# Patient Record
Sex: MALE | Race: WHITE | Employment: OTHER | URBAN - NONMETROPOLITAN AREA
[De-identification: names, ages, dates, MRNs, and addresses within clinical notes are randomized per-mention and may not be internally consistent; named-entity substitution may affect disease eponyms.]

---

## 2017-01-04 ENCOUNTER — PROCEDURE VISIT (OUTPATIENT)
Dept: CARDIOLOGY | Age: 67
End: 2017-01-04

## 2017-01-04 DIAGNOSIS — Z95.810 BIVENTRICULAR IMPLANTABLE CARDIOVERTER-DEFIBRILLATOR IN SITU: Primary | ICD-10-CM

## 2017-01-04 PROCEDURE — 93295 DEV INTERROG REMOTE 1/2/MLT: CPT | Performed by: INTERNAL MEDICINE

## 2017-01-04 PROCEDURE — 93296 REM INTERROG EVL PM/IDS: CPT | Performed by: INTERNAL MEDICINE

## 2017-01-17 ENCOUNTER — OFFICE VISIT (OUTPATIENT)
Dept: CARDIOLOGY | Age: 67
End: 2017-01-17

## 2017-01-17 VITALS
HEART RATE: 88 BPM | WEIGHT: 157.6 LBS | SYSTOLIC BLOOD PRESSURE: 160 MMHG | BODY MASS INDEX: 23.34 KG/M2 | DIASTOLIC BLOOD PRESSURE: 88 MMHG | HEIGHT: 69 IN

## 2017-01-17 DIAGNOSIS — R05.9 COUGH: ICD-10-CM

## 2017-01-17 DIAGNOSIS — I42.9 CARDIOMYOPATHY (HCC): Primary | ICD-10-CM

## 2017-01-17 DIAGNOSIS — I48.20 CHRONIC ATRIAL FIBRILLATION (HCC): ICD-10-CM

## 2017-01-17 DIAGNOSIS — Z95.810 ICD (IMPLANTABLE CARDIOVERTER-DEFIBRILLATOR) IN PLACE: ICD-10-CM

## 2017-01-17 PROCEDURE — 99214 OFFICE O/P EST MOD 30 MIN: CPT | Performed by: NUCLEAR MEDICINE

## 2017-01-17 PROCEDURE — 4040F PNEUMOC VAC/ADMIN/RCVD: CPT | Performed by: NUCLEAR MEDICINE

## 2017-01-17 PROCEDURE — G8427 DOCREV CUR MEDS BY ELIG CLIN: HCPCS | Performed by: NUCLEAR MEDICINE

## 2017-01-17 PROCEDURE — G8420 CALC BMI NORM PARAMETERS: HCPCS | Performed by: NUCLEAR MEDICINE

## 2017-01-17 PROCEDURE — G8484 FLU IMMUNIZE NO ADMIN: HCPCS | Performed by: NUCLEAR MEDICINE

## 2017-01-17 PROCEDURE — 1123F ACP DISCUSS/DSCN MKR DOCD: CPT | Performed by: NUCLEAR MEDICINE

## 2017-01-17 PROCEDURE — 3017F COLORECTAL CA SCREEN DOC REV: CPT | Performed by: NUCLEAR MEDICINE

## 2017-01-17 PROCEDURE — 1036F TOBACCO NON-USER: CPT | Performed by: NUCLEAR MEDICINE

## 2017-02-08 DIAGNOSIS — I42.9 CARDIOMYOPATHY (HCC): ICD-10-CM

## 2017-02-08 DIAGNOSIS — Z95.810 ICD (IMPLANTABLE CARDIOVERTER-DEFIBRILLATOR) IN PLACE: ICD-10-CM

## 2017-02-08 DIAGNOSIS — I48.20 CHRONIC ATRIAL FIBRILLATION (HCC): ICD-10-CM

## 2017-02-08 DIAGNOSIS — R05.9 COUGH: ICD-10-CM

## 2017-02-14 ENCOUNTER — OFFICE VISIT (OUTPATIENT)
Dept: CARDIOLOGY | Age: 67
End: 2017-02-14

## 2017-02-14 VITALS
WEIGHT: 155.6 LBS | SYSTOLIC BLOOD PRESSURE: 190 MMHG | BODY MASS INDEX: 22.28 KG/M2 | HEIGHT: 70 IN | DIASTOLIC BLOOD PRESSURE: 110 MMHG | HEART RATE: 88 BPM

## 2017-02-14 DIAGNOSIS — I10 ESSENTIAL HYPERTENSION: Primary | ICD-10-CM

## 2017-02-14 PROCEDURE — 3017F COLORECTAL CA SCREEN DOC REV: CPT | Performed by: NUCLEAR MEDICINE

## 2017-02-14 PROCEDURE — 1036F TOBACCO NON-USER: CPT | Performed by: NUCLEAR MEDICINE

## 2017-02-14 PROCEDURE — 1123F ACP DISCUSS/DSCN MKR DOCD: CPT | Performed by: NUCLEAR MEDICINE

## 2017-02-14 PROCEDURE — G8427 DOCREV CUR MEDS BY ELIG CLIN: HCPCS | Performed by: NUCLEAR MEDICINE

## 2017-02-14 PROCEDURE — G8484 FLU IMMUNIZE NO ADMIN: HCPCS | Performed by: NUCLEAR MEDICINE

## 2017-02-14 PROCEDURE — G8419 CALC BMI OUT NRM PARAM NOF/U: HCPCS | Performed by: NUCLEAR MEDICINE

## 2017-02-14 PROCEDURE — 4040F PNEUMOC VAC/ADMIN/RCVD: CPT | Performed by: NUCLEAR MEDICINE

## 2017-02-14 PROCEDURE — 99213 OFFICE O/P EST LOW 20 MIN: CPT | Performed by: NUCLEAR MEDICINE

## 2017-02-14 RX ORDER — LOSARTAN POTASSIUM 100 MG/1
100 TABLET ORAL DAILY
Qty: 30 TABLET | Refills: 12 | Status: ON HOLD | OUTPATIENT
Start: 2017-02-14 | End: 2017-03-06 | Stop reason: HOSPADM

## 2017-02-14 RX ORDER — PANTOPRAZOLE SODIUM 40 MG/1
40 TABLET, DELAYED RELEASE ORAL DAILY
Qty: 30 TABLET | Refills: 12 | Status: SHIPPED | OUTPATIENT
Start: 2017-02-14 | End: 2017-03-10 | Stop reason: SDUPTHER

## 2017-03-03 ENCOUNTER — NURSE TRIAGE (OUTPATIENT)
Dept: ADMINISTRATIVE | Age: 67
End: 2017-03-03

## 2017-03-04 PROBLEM — E87.1 HYPONATREMIA: Status: ACTIVE | Noted: 2017-03-04

## 2017-03-04 PROBLEM — I49.01 VENTRICULAR FIBRILLATION (HCC): Status: ACTIVE | Noted: 2017-03-04

## 2017-03-06 ENCOUNTER — TELEPHONE (OUTPATIENT)
Dept: CARDIOLOGY | Age: 67
End: 2017-03-06

## 2017-03-09 ENCOUNTER — OFFICE VISIT (OUTPATIENT)
Dept: CARDIOLOGY | Age: 67
End: 2017-03-09

## 2017-03-09 VITALS
HEART RATE: 77 BPM | WEIGHT: 153 LBS | BODY MASS INDEX: 22.66 KG/M2 | HEIGHT: 69 IN | DIASTOLIC BLOOD PRESSURE: 82 MMHG | SYSTOLIC BLOOD PRESSURE: 158 MMHG

## 2017-03-09 DIAGNOSIS — Z45.02 ICD (IMPLANTABLE CARDIOVERTER-DEFIBRILLATOR) DISCHARGE: ICD-10-CM

## 2017-03-09 DIAGNOSIS — I49.01 VENTRICULAR FIBRILLATION (HCC): ICD-10-CM

## 2017-03-09 DIAGNOSIS — I42.9 CARDIOMYOPATHY (HCC): Primary | ICD-10-CM

## 2017-03-09 PROCEDURE — 93000 ELECTROCARDIOGRAM COMPLETE: CPT | Performed by: NUCLEAR MEDICINE

## 2017-03-09 PROCEDURE — 99999 PR OFFICE/OUTPT VISIT,PROCEDURE ONLY: CPT | Performed by: NUCLEAR MEDICINE

## 2017-03-09 PROCEDURE — 1036F TOBACCO NON-USER: CPT | Performed by: NUCLEAR MEDICINE

## 2017-03-10 RX ORDER — LOSARTAN POTASSIUM 50 MG/1
50 TABLET ORAL DAILY
Qty: 90 TABLET | Refills: 3 | Status: SHIPPED | OUTPATIENT
Start: 2017-03-10 | End: 2017-03-28 | Stop reason: DRUGHIGH

## 2017-03-10 RX ORDER — FOLIC ACID 1 MG/1
1 TABLET ORAL DAILY
Qty: 90 TABLET | Refills: 3 | Status: SHIPPED | OUTPATIENT
Start: 2017-03-10 | End: 2019-04-30 | Stop reason: ALTCHOICE

## 2017-03-10 RX ORDER — METOPROLOL SUCCINATE 100 MG/1
100 TABLET, EXTENDED RELEASE ORAL DAILY
Qty: 90 TABLET | Refills: 3 | Status: SHIPPED | OUTPATIENT
Start: 2017-03-10 | End: 2018-01-02 | Stop reason: DRUGHIGH

## 2017-03-10 RX ORDER — FLUTICASONE PROPIONATE 50 MCG
1 SPRAY, SUSPENSION (ML) NASAL DAILY
Qty: 1 BOTTLE | Refills: 2 | Status: SHIPPED | OUTPATIENT
Start: 2017-03-10 | End: 2019-07-02 | Stop reason: ALTCHOICE

## 2017-03-10 RX ORDER — PANTOPRAZOLE SODIUM 40 MG/1
40 TABLET, DELAYED RELEASE ORAL DAILY
Qty: 90 TABLET | Refills: 3 | Status: SHIPPED | OUTPATIENT
Start: 2017-03-10 | End: 2019-04-30 | Stop reason: ALTCHOICE

## 2017-03-28 ENCOUNTER — OFFICE VISIT (OUTPATIENT)
Dept: CARDIOLOGY | Age: 67
End: 2017-03-28

## 2017-03-28 VITALS
HEIGHT: 70 IN | HEART RATE: 80 BPM | WEIGHT: 153.8 LBS | DIASTOLIC BLOOD PRESSURE: 90 MMHG | SYSTOLIC BLOOD PRESSURE: 134 MMHG | BODY MASS INDEX: 22.02 KG/M2

## 2017-03-28 DIAGNOSIS — I10 ESSENTIAL HYPERTENSION: Primary | ICD-10-CM

## 2017-03-28 DIAGNOSIS — Z95.810 ICD (IMPLANTABLE CARDIOVERTER-DEFIBRILLATOR) IN PLACE: ICD-10-CM

## 2017-03-28 DIAGNOSIS — I47.20 V TACH: ICD-10-CM

## 2017-03-28 PROCEDURE — 99213 OFFICE O/P EST LOW 20 MIN: CPT | Performed by: NUCLEAR MEDICINE

## 2017-03-28 PROCEDURE — 1123F ACP DISCUSS/DSCN MKR DOCD: CPT | Performed by: NUCLEAR MEDICINE

## 2017-03-28 PROCEDURE — G8419 CALC BMI OUT NRM PARAM NOF/U: HCPCS | Performed by: NUCLEAR MEDICINE

## 2017-03-28 PROCEDURE — 1036F TOBACCO NON-USER: CPT | Performed by: NUCLEAR MEDICINE

## 2017-03-28 PROCEDURE — G8427 DOCREV CUR MEDS BY ELIG CLIN: HCPCS | Performed by: NUCLEAR MEDICINE

## 2017-03-28 PROCEDURE — 3017F COLORECTAL CA SCREEN DOC REV: CPT | Performed by: NUCLEAR MEDICINE

## 2017-03-28 PROCEDURE — G8484 FLU IMMUNIZE NO ADMIN: HCPCS | Performed by: NUCLEAR MEDICINE

## 2017-03-28 PROCEDURE — 4040F PNEUMOC VAC/ADMIN/RCVD: CPT | Performed by: NUCLEAR MEDICINE

## 2017-03-28 PROCEDURE — 1111F DSCHRG MED/CURRENT MED MERGE: CPT | Performed by: NUCLEAR MEDICINE

## 2017-03-28 RX ORDER — LOSARTAN POTASSIUM 100 MG/1
100 TABLET ORAL DAILY
Qty: 90 TABLET | Refills: 3 | Status: ON HOLD | OUTPATIENT
Start: 2017-03-28 | End: 2017-11-13 | Stop reason: ALTCHOICE

## 2017-03-28 RX ORDER — LOSARTAN POTASSIUM 100 MG/1
100 TABLET ORAL DAILY
COMMUNITY
End: 2017-03-28 | Stop reason: SDUPTHER

## 2017-04-05 ENCOUNTER — PROCEDURE VISIT (OUTPATIENT)
Dept: CARDIOLOGY | Age: 67
End: 2017-04-05

## 2017-04-05 DIAGNOSIS — Z95.810 BIVENTRICULAR IMPLANTABLE CARDIOVERTER-DEFIBRILLATOR IN SITU: Primary | ICD-10-CM

## 2017-04-05 PROCEDURE — 93296 REM INTERROG EVL PM/IDS: CPT | Performed by: INTERNAL MEDICINE

## 2017-04-05 PROCEDURE — 93295 DEV INTERROG REMOTE 1/2/MLT: CPT | Performed by: INTERNAL MEDICINE

## 2017-07-05 ENCOUNTER — PROCEDURE VISIT (OUTPATIENT)
Dept: CARDIOLOGY | Age: 67
End: 2017-07-05

## 2017-07-05 DIAGNOSIS — Z95.810 BIVENTRICULAR IMPLANTABLE CARDIOVERTER-DEFIBRILLATOR IN SITU: Primary | ICD-10-CM

## 2017-07-05 PROCEDURE — 93295 DEV INTERROG REMOTE 1/2/MLT: CPT | Performed by: INTERNAL MEDICINE

## 2017-07-05 PROCEDURE — 93296 REM INTERROG EVL PM/IDS: CPT | Performed by: INTERNAL MEDICINE

## 2017-10-17 ENCOUNTER — PROCEDURE VISIT (OUTPATIENT)
Dept: CARDIOLOGY CLINIC | Age: 67
End: 2017-10-17
Payer: MEDICARE

## 2017-10-17 ENCOUNTER — OFFICE VISIT (OUTPATIENT)
Dept: CARDIOLOGY CLINIC | Age: 67
End: 2017-10-17
Payer: MEDICARE

## 2017-10-17 VITALS
SYSTOLIC BLOOD PRESSURE: 146 MMHG | HEART RATE: 80 BPM | WEIGHT: 154 LBS | DIASTOLIC BLOOD PRESSURE: 100 MMHG | BODY MASS INDEX: 22.1 KG/M2

## 2017-10-17 DIAGNOSIS — Z95.810 ICD (IMPLANTABLE CARDIOVERTER-DEFIBRILLATOR) IN PLACE: ICD-10-CM

## 2017-10-17 DIAGNOSIS — R07.89 CHEST HEAVINESS: ICD-10-CM

## 2017-10-17 DIAGNOSIS — I10 ESSENTIAL HYPERTENSION: ICD-10-CM

## 2017-10-17 DIAGNOSIS — I42.0 DILATED CARDIOMYOPATHY (HCC): Primary | ICD-10-CM

## 2017-10-17 DIAGNOSIS — Z95.810 BIVENTRICULAR IMPLANTABLE CARDIOVERTER-DEFIBRILLATOR IN SITU: Primary | ICD-10-CM

## 2017-10-17 PROCEDURE — 1123F ACP DISCUSS/DSCN MKR DOCD: CPT | Performed by: NUCLEAR MEDICINE

## 2017-10-17 PROCEDURE — 3017F COLORECTAL CA SCREEN DOC REV: CPT | Performed by: NUCLEAR MEDICINE

## 2017-10-17 PROCEDURE — G8484 FLU IMMUNIZE NO ADMIN: HCPCS | Performed by: NUCLEAR MEDICINE

## 2017-10-17 PROCEDURE — 1036F TOBACCO NON-USER: CPT | Performed by: NUCLEAR MEDICINE

## 2017-10-17 PROCEDURE — 99214 OFFICE O/P EST MOD 30 MIN: CPT | Performed by: NUCLEAR MEDICINE

## 2017-10-17 PROCEDURE — 93296 REM INTERROG EVL PM/IDS: CPT | Performed by: INTERNAL MEDICINE

## 2017-10-17 PROCEDURE — 4040F PNEUMOC VAC/ADMIN/RCVD: CPT | Performed by: NUCLEAR MEDICINE

## 2017-10-17 PROCEDURE — G8427 DOCREV CUR MEDS BY ELIG CLIN: HCPCS | Performed by: NUCLEAR MEDICINE

## 2017-10-17 PROCEDURE — G8420 CALC BMI NORM PARAMETERS: HCPCS | Performed by: NUCLEAR MEDICINE

## 2017-10-17 PROCEDURE — 93295 DEV INTERROG REMOTE 1/2/MLT: CPT | Performed by: INTERNAL MEDICINE

## 2017-10-17 RX ORDER — NITROGLYCERIN 0.4 MG/1
0.4 TABLET SUBLINGUAL EVERY 5 MIN PRN
COMMUNITY
End: 2017-10-17 | Stop reason: SDUPTHER

## 2017-10-17 RX ORDER — VALSARTAN 320 MG/1
320 TABLET ORAL DAILY
Qty: 90 TABLET | Refills: 3 | Status: SHIPPED | OUTPATIENT
Start: 2017-10-17 | End: 2018-08-07 | Stop reason: ALTCHOICE

## 2017-10-17 RX ORDER — VALSARTAN 320 MG/1
320 TABLET ORAL DAILY
COMMUNITY
End: 2017-10-17 | Stop reason: SDUPTHER

## 2017-10-17 RX ORDER — NITROGLYCERIN 0.4 MG/1
0.4 TABLET SUBLINGUAL EVERY 5 MIN PRN
Qty: 25 TABLET | Refills: 3 | Status: SHIPPED | OUTPATIENT
Start: 2017-10-17 | End: 2018-12-21 | Stop reason: SDUPTHER

## 2017-10-17 RX ORDER — AMIODARONE HYDROCHLORIDE 200 MG/1
200 TABLET ORAL DAILY
Qty: 90 TABLET | Refills: 3 | Status: SHIPPED | OUTPATIENT
Start: 2017-10-17 | End: 2018-12-21 | Stop reason: SDUPTHER

## 2017-10-17 NOTE — PROGRESS NOTES
[facility]  [address] [facility].  [facility] [address]  Dept: [phone]  Dept Fax: [phone]  Loc: [phone]    Visit Date: 10/17/2017    Shannen Oliveira is a 79 y.o. male who presents today for:  Chief Complaint   Patient presents with    6 Month Follow-Up    Coronary Artery Disease    Cardiomyopathy    Shortness of Breath     Fluctuating BP  Still drinking  Still coughing   Some chest tightness  Some dyspnea  More than usual   Has had chest pressure  More in the morning   Uses nitro as well   Cath 2006 was with no major CAD  No ICD shocks       HPI:  HPI  Past Medical History:   Diagnosis Date    CHARISSA (acute kidney injury) ([address]     Arthritis     Blood circulation, collateral     CAD (coronary artery disease)     Cardiomyopathy ([facility] 8/13/2014    Chronic atrial fibrillation ([address] 8/13/2014    Chronic kidney disease     Congenital heart disease     Deafness congenital 8/13/2014    ETOH abuse 8/13/2014    GERD (gastroesophageal reflux disease)     Hyperlipidemia     Hypertension     Medical non-compliance 8/13/2014    Medtronic BiV ICD implanted 9/18/14 [facility] 10/8/2014    Medtronic BiV ICD-no atrial lead 10/8/2014      Past Surgical History:   Procedure Laterality Date    CARDIAC DEFIBRILLATOR PLACEMENT  2005    PACEMAKER INSERTION  2005    PROSTATE SURGERY  2011    PVP     Family History   Problem Relation Age of Onset    Diabetes Mother     Arthritis Mother     Heart Disease Father     High Blood Pressure Father     High Cholesterol Father     Arthritis Father      Social History   Substance Use Topics    Smoking status: Never Smoker    Smokeless tobacco: Never Used    Alcohol use 9.6 oz/week     16 Cans of beer per week      Comment: 16 cans daily      Current Outpatient Prescriptions   Medication Sig Dispense Refill    nitroGLYCERIN (NITROSTAT) 0.4 MG SL tablet Place 0.4 mg under the tongue every 5 minutes as needed for Chest pain up to max of 3 total doses. If no relief after 1 dose, call 911.  losartan (COZAAR) 100 MG tablet Take 1 tablet by mouth daily 90 tablet 3    folic acid (FOLVITE) 1 MG tablet Take 1 tablet by mouth daily 90 tablet 3    metoprolol succinate (TOPROL XL) 100 MG extended release tablet Take 1 tablet by mouth daily 90 tablet 3    pantoprazole (PROTONIX) 40 MG tablet Take 1 tablet by mouth daily 90 tablet 3    Multiple Vitamin (MULTIVITAMIN) tablet Take 1 tablet by mouth daily  0    amiodarone (CORDARONE) 200 MG tablet Take 1 tablet by mouth daily 30 tablet 3    Acetaminophen-Codeine (TYLENOL WITH CODEINE #3 PO) Take 1 tablet by mouth every 6 hours as needed (for pain)      vitamin B-1 (THIAMINE) 100 MG tablet Take 100 mg by mouth daily      aspirin 81 MG EC tablet Take 81 mg by mouth daily.  fluticasone (FLONASE) 50 MCG/ACT nasal spray 1 spray by Nasal route daily 1 Bottle 2     No current facility-administered medications for this visit. No Known Allergies  Health Maintenance   Topic Date Due    Hepatitis C screen  1950    DTaP/Tdap/Td vaccine (1 - Tdap) 06/03/1969    Colon cancer screen colonoscopy  06/03/2000    Zostavax vaccine  06/03/2010    Pneumococcal low/med risk (1 of 2 - PCV13) 06/03/2015    Flu vaccine (1) 09/01/2017    Lipid screen  03/04/2022       Subjective:  Review of Systems  General:   No fever, no chills, No fatigue or weight loss  Pulmonary:    some dyspnea, no wheezing  Cardiac:    Does have recent chest pain,   GI:     No nausea or vomiting, no abdominal pain  Neuro:     No dizziness or light headedness,   Musculoskeletal:  No recent active issues  Extremities:   No edema, good peripheral pulses      Objective:  Physical Exam  BP (!) 146/100   Pulse 80   Wt 154 lb (69.9 kg)   BMI 22.10 kg/m²   General:   Well developed, well nourished  Lungs:    Clear to auscultation  Heart:    Normal S1 S2, Slight murmur.  no rubs, no gallops  Abdomen:   Soft, non tender, no organomegalies, positive bowel sounds  Extremities:   No edema, no cyanosis, good peripheral pulses  Neurological:   Awake, alert, oriented. No obvious focal deficits  Musculoskelatal:  No obvious deformities    Assessment:     1. Dilated cardiomyopathy ([address]     2. Essential hypertension     3. ICD (implantable cardioverter-defibrillator) in place     uncontrolled BP  Symptoms as above  Risk for CAD     Plan:  No Follow-up on file. Consider a stress test and echo   Change to diovan 320 daily  Continue risk factor modification and medical management  Thank you for allowing me to participate in the care of your patient. Please don't hesitate to contact me regarding any further issues related to the patient care    Orders Placed:  No orders of the defined types were placed in this encounter. Medications Prescribed:  No orders of the defined types were placed in this encounter. Discussed use, benefit, and side effects of prescribed medications. All patient questions answered. Pt voiced understanding. Instructed to continue current medications, diet and exercise. Continue risk factor modification and medical management. Patient agreed with treatment plan. Follow up as directed.     Electronically signed by Maxim Agustin MD on 10/17/2017 at 1:43 PM

## 2017-10-17 NOTE — PROGRESS NOTES
Patient here for 6 month follow up. Patient had to took a Nitro in the morning due to chest tightness.

## 2017-10-17 NOTE — PROGRESS NOTES
DR Linda Villanueva PT  MEDTRONIC BIV ICD   NO ATRIAL LEAD  BATTERY 4.7 YRS REMAINING ON DEVICE  NO ATRIAL LEAD  RV IMPEDENCE 513  LV IMPEDENCE 323  RV WAVES 2.8  VVIR   A PACED 0%  V PACED 98.7%  OPTIVOL WNL  APPEARS TO HAVE SEVERAL BEATS VT

## 2017-10-31 ENCOUNTER — TELEPHONE (OUTPATIENT)
Dept: CARDIOLOGY CLINIC | Age: 67
End: 2017-10-31

## 2017-10-31 DIAGNOSIS — R07.2 PRECORDIAL PAIN: ICD-10-CM

## 2017-10-31 DIAGNOSIS — R94.39 ABNORMAL STRESS TEST: Primary | ICD-10-CM

## 2017-10-31 NOTE — TELEPHONE ENCOUNTER
Verbal orders per Dr. Ronnell Cockayne, patient had abnormal stress, needs heart cath. Agree? Spoke to Nicolette Holguin, daughter okay to schedule please call Viv Dye, son on HIPPA and DARREN. Darren for Lesia to call back to notify we are scheduling a heart cath.

## 2017-11-01 NOTE — TELEPHONE ENCOUNTER
Cath schedule for 11/13/17 at 1:00 arrive at 11:00 [address] to [place] and voice understanding  Mailed information

## 2017-11-06 DIAGNOSIS — I10 ESSENTIAL HYPERTENSION: ICD-10-CM

## 2017-11-06 DIAGNOSIS — R94.39 ABNORMAL STRESS TEST: ICD-10-CM

## 2017-11-06 DIAGNOSIS — R07.89 CHEST HEAVINESS: ICD-10-CM

## 2017-11-06 DIAGNOSIS — R07.2 PRECORDIAL PAIN: ICD-10-CM

## 2017-11-06 DIAGNOSIS — Z95.810 ICD (IMPLANTABLE CARDIOVERTER-DEFIBRILLATOR) IN PLACE: ICD-10-CM

## 2017-11-06 DIAGNOSIS — I42.0 DILATED CARDIOMYOPATHY (HCC): ICD-10-CM

## 2017-11-10 NOTE — PROGRESS NOTES
Gave instructions to daughter  NPO after midnight  [employer] and drivers license  Wear comfortable clean clothing  Do not bring jewelry   Shower night before and morning of surgery with a liquid antibacterial soap  Bring medications in original bottles  Follow all instructions given by your physician   needed at discharge

## 2017-11-12 ENCOUNTER — PREP FOR PROCEDURE (OUTPATIENT)
Dept: CARDIOLOGY | Age: 67
End: 2017-11-12

## 2017-11-12 RX ORDER — DIPHENHYDRAMINE HYDROCHLORIDE 50 MG/ML
25 INJECTION INTRAMUSCULAR; INTRAVENOUS ONCE
Status: CANCELLED | OUTPATIENT
Start: 2017-11-12 | End: 2017-11-12

## 2017-11-12 RX ORDER — NITROGLYCERIN 0.4 MG/1
0.4 TABLET SUBLINGUAL EVERY 5 MIN PRN
Status: CANCELLED | OUTPATIENT
Start: 2017-11-12

## 2017-11-12 RX ORDER — SODIUM CHLORIDE 0.9 % (FLUSH) 0.9 %
10 SYRINGE (ML) INJECTION PRN
Status: CANCELLED | OUTPATIENT
Start: 2017-11-12

## 2017-11-12 RX ORDER — ASPIRIN 81 MG/1
324 TABLET, CHEWABLE ORAL ONCE
Status: CANCELLED | OUTPATIENT
Start: 2017-11-12 | End: 2017-11-12

## 2017-11-12 RX ORDER — SODIUM CHLORIDE 9 MG/ML
INJECTION, SOLUTION INTRAVENOUS CONTINUOUS
Status: CANCELLED | OUTPATIENT
Start: 2017-11-12

## 2017-11-12 RX ORDER — SODIUM CHLORIDE 0.9 % (FLUSH) 0.9 %
10 SYRINGE (ML) INJECTION EVERY 12 HOURS SCHEDULED
Status: CANCELLED | OUTPATIENT
Start: 2017-11-12

## 2017-11-13 ENCOUNTER — APPOINTMENT (OUTPATIENT)
Dept: CARDIAC CATH/INVASIVE PROCEDURES | Age: 67
End: 2017-11-13
Attending: NUCLEAR MEDICINE
Payer: MEDICARE

## 2017-11-13 ENCOUNTER — HOSPITAL ENCOUNTER (OUTPATIENT)
Dept: INPATIENT UNIT | Age: 67
Discharge: HOME | End: 2017-11-14
Attending: NUCLEAR MEDICINE | Admitting: INTERNAL MEDICINE
Payer: MEDICARE

## 2017-11-13 LAB
ABO: NORMAL
ACTIVATED CLOTTING TIME: 461 SECONDS (ref 1–150)
ANION GAP SERPL CALCULATED.3IONS-SCNC: 13 MEQ/L (ref 8–16)
ANTIBODY SCREEN: NORMAL
BUN BLDV-MCNC: 19 MG/DL (ref 7–22)
CALCIUM SERPL-MCNC: 9.8 MG/DL (ref 8.5–10.5)
CHLORIDE BLD-SCNC: 95 MEQ/L (ref 98–111)
CHOLESTEROL, TOTAL: 200 MG/DL (ref 100–199)
CO2: 26 MEQ/L (ref 23–33)
CREAT SERPL-MCNC: 1.3 MG/DL (ref 0.4–1.2)
EKG ATRIAL RATE: 79 BPM
EKG Q-T INTERVAL: 478 MS
EKG QRS DURATION: 146 MS
EKG QTC CALCULATION (BAZETT): 523 MS
EKG R AXIS: -40 DEGREES
EKG T AXIS: 61 DEGREES
EKG VENTRICULAR RATE: 72 BPM
GFR SERPL CREATININE-BSD FRML MDRD: 55 ML/MIN/1.73M2
GLUCOSE BLD-MCNC: 115 MG/DL (ref 70–108)
HCT VFR BLD CALC: 43.2 % (ref 42–52)
HDLC SERPL-MCNC: 95 MG/DL
HEMOGLOBIN: 15 GM/DL (ref 14–18)
INR BLD: 0.97 (ref 0.85–1.13)
LDL CHOLESTEROL CALCULATED: 83 MG/DL
MCH RBC QN AUTO: 35.2 PG (ref 27–31)
MCHC RBC AUTO-ENTMCNC: 34.7 GM/DL (ref 33–37)
MCV RBC AUTO: 101.5 FL (ref 80–94)
PDW BLD-RTO: 14.4 % (ref 11.5–14.5)
PLATELET # BLD: 192 THOU/MM3 (ref 130–400)
PMV BLD AUTO: 7.9 MCM (ref 7.4–10.4)
POTASSIUM SERPL-SCNC: 5 MEQ/L (ref 3.5–5.2)
RBC # BLD: 4.26 MILL/MM3 (ref 4.7–6.1)
RH FACTOR: NORMAL
SODIUM BLD-SCNC: 134 MEQ/L (ref 135–145)
TRIGL SERPL-MCNC: 110 MG/DL (ref 0–199)
WBC # BLD: 4.8 THOU/MM3 (ref 4.8–10.8)

## 2017-11-13 PROCEDURE — C1874 STENT, COATED/COV W/DEL SYS: HCPCS

## 2017-11-13 PROCEDURE — 2580000003 HC RX 258: Performed by: PHYSICIAN ASSISTANT

## 2017-11-13 PROCEDURE — 86900 BLOOD TYPING SEROLOGIC ABO: CPT

## 2017-11-13 PROCEDURE — 2720000010 HC SURG SUPPLY STERILE

## 2017-11-13 PROCEDURE — 86901 BLOOD TYPING SEROLOGIC RH(D): CPT

## 2017-11-13 PROCEDURE — 2580000003 HC RX 258

## 2017-11-13 PROCEDURE — 85347 COAGULATION TIME ACTIVATED: CPT

## 2017-11-13 PROCEDURE — C1760 CLOSURE DEV, VASC: HCPCS

## 2017-11-13 PROCEDURE — A6258 TRANSPARENT FILM >16<=48 IN: HCPCS

## 2017-11-13 PROCEDURE — 80061 LIPID PANEL: CPT

## 2017-11-13 PROCEDURE — 2500000003 HC RX 250 WO HCPCS

## 2017-11-13 PROCEDURE — 86850 RBC ANTIBODY SCREEN: CPT

## 2017-11-13 PROCEDURE — C1887 CATHETER, GUIDING: HCPCS

## 2017-11-13 PROCEDURE — 6360000002 HC RX W HCPCS

## 2017-11-13 PROCEDURE — 36415 COLL VENOUS BLD VENIPUNCTURE: CPT

## 2017-11-13 PROCEDURE — 80048 BASIC METABOLIC PNL TOTAL CA: CPT

## 2017-11-13 PROCEDURE — C1725 CATH, TRANSLUMIN NON-LASER: HCPCS

## 2017-11-13 PROCEDURE — 92928 PRQ TCAT PLMT NTRAC ST 1 LES: CPT | Performed by: INTERNAL MEDICINE

## 2017-11-13 PROCEDURE — 85610 PROTHROMBIN TIME: CPT

## 2017-11-13 PROCEDURE — C1769 GUIDE WIRE: HCPCS

## 2017-11-13 PROCEDURE — 6370000000 HC RX 637 (ALT 250 FOR IP)

## 2017-11-13 PROCEDURE — 93458 L HRT ARTERY/VENTRICLE ANGIO: CPT | Performed by: NUCLEAR MEDICINE

## 2017-11-13 PROCEDURE — 93005 ELECTROCARDIOGRAM TRACING: CPT

## 2017-11-13 PROCEDURE — 85027 COMPLETE CBC AUTOMATED: CPT

## 2017-11-13 PROCEDURE — C1894 INTRO/SHEATH, NON-LASER: HCPCS

## 2017-11-13 PROCEDURE — C9600 PERC DRUG-EL COR STENT SING: HCPCS | Performed by: NUCLEAR MEDICINE

## 2017-11-13 RX ORDER — THIAMINE MONONITRATE (VIT B1) 100 MG
100 TABLET ORAL DAILY
Status: DISCONTINUED | OUTPATIENT
Start: 2017-11-14 | End: 2017-11-14 | Stop reason: HOSPADM

## 2017-11-13 RX ORDER — SODIUM CHLORIDE 0.9 % (FLUSH) 0.9 %
10 SYRINGE (ML) INJECTION EVERY 12 HOURS SCHEDULED
Status: DISCONTINUED | OUTPATIENT
Start: 2017-11-13 | End: 2017-11-14 | Stop reason: HOSPADM

## 2017-11-13 RX ORDER — NITROGLYCERIN 0.4 MG/1
0.4 TABLET SUBLINGUAL EVERY 5 MIN PRN
Status: DISCONTINUED | OUTPATIENT
Start: 2017-11-13 | End: 2017-11-14 | Stop reason: HOSPADM

## 2017-11-13 RX ORDER — SODIUM CHLORIDE 0.9 % (FLUSH) 0.9 %
10 SYRINGE (ML) INJECTION PRN
Status: DISCONTINUED | OUTPATIENT
Start: 2017-11-13 | End: 2017-11-14 | Stop reason: HOSPADM

## 2017-11-13 RX ORDER — SODIUM CHLORIDE 9 MG/ML
INJECTION, SOLUTION INTRAVENOUS CONTINUOUS
Status: DISCONTINUED | OUTPATIENT
Start: 2017-11-13 | End: 2017-11-14 | Stop reason: HOSPADM

## 2017-11-13 RX ORDER — DIPHENHYDRAMINE HYDROCHLORIDE 50 MG/ML
25 INJECTION INTRAMUSCULAR; INTRAVENOUS ONCE
Status: DISCONTINUED | OUTPATIENT
Start: 2017-11-13 | End: 2017-11-14 | Stop reason: HOSPADM

## 2017-11-13 RX ORDER — ACETAMINOPHEN 325 MG/1
650 TABLET ORAL EVERY 4 HOURS PRN
Status: DISCONTINUED | OUTPATIENT
Start: 2017-11-13 | End: 2017-11-14 | Stop reason: HOSPADM

## 2017-11-13 RX ORDER — VALSARTAN 320 MG/1
320 TABLET ORAL DAILY
Status: DISCONTINUED | OUTPATIENT
Start: 2017-11-14 | End: 2017-11-14 | Stop reason: HOSPADM

## 2017-11-13 RX ORDER — ONDANSETRON 2 MG/ML
4 INJECTION INTRAMUSCULAR; INTRAVENOUS EVERY 6 HOURS PRN
Status: DISCONTINUED | OUTPATIENT
Start: 2017-11-13 | End: 2017-11-14 | Stop reason: HOSPADM

## 2017-11-13 RX ORDER — FOLIC ACID 1 MG/1
1 TABLET ORAL DAILY
Status: DISCONTINUED | OUTPATIENT
Start: 2017-11-14 | End: 2017-11-14 | Stop reason: HOSPADM

## 2017-11-13 RX ORDER — SODIUM CHLORIDE 0.9 % (FLUSH) 0.9 %
10 SYRINGE (ML) INJECTION EVERY 12 HOURS SCHEDULED
Status: DISCONTINUED | OUTPATIENT
Start: 2017-11-13 | End: 2017-11-13 | Stop reason: SDUPTHER

## 2017-11-13 RX ORDER — FLUTICASONE PROPIONATE 50 MCG
1 SPRAY, SUSPENSION (ML) NASAL DAILY
Status: DISCONTINUED | OUTPATIENT
Start: 2017-11-14 | End: 2017-11-14 | Stop reason: HOSPADM

## 2017-11-13 RX ORDER — CLOPIDOGREL BISULFATE 75 MG/1
75 TABLET ORAL DAILY
Status: DISCONTINUED | OUTPATIENT
Start: 2017-11-14 | End: 2017-11-14 | Stop reason: HOSPADM

## 2017-11-13 RX ORDER — MULTIVITAMIN WITH FOLIC ACID 400 MCG
1 TABLET ORAL DAILY
Status: DISCONTINUED | OUTPATIENT
Start: 2017-11-14 | End: 2017-11-14 | Stop reason: HOSPADM

## 2017-11-13 RX ORDER — LORAZEPAM 2 MG/ML
1 INJECTION INTRAMUSCULAR EVERY 4 HOURS PRN
Status: DISCONTINUED | OUTPATIENT
Start: 2017-11-13 | End: 2017-11-14 | Stop reason: HOSPADM

## 2017-11-13 RX ORDER — AMIODARONE HYDROCHLORIDE 200 MG/1
200 TABLET ORAL DAILY
Status: DISCONTINUED | OUTPATIENT
Start: 2017-11-14 | End: 2017-11-14 | Stop reason: HOSPADM

## 2017-11-13 RX ORDER — SODIUM CHLORIDE 0.9 % (FLUSH) 0.9 %
10 SYRINGE (ML) INJECTION PRN
Status: DISCONTINUED | OUTPATIENT
Start: 2017-11-13 | End: 2017-11-13 | Stop reason: SDUPTHER

## 2017-11-13 RX ORDER — ASPIRIN 81 MG/1
324 TABLET, CHEWABLE ORAL ONCE
Status: DISCONTINUED | OUTPATIENT
Start: 2017-11-13 | End: 2017-11-14 | Stop reason: HOSPADM

## 2017-11-13 RX ORDER — ASPIRIN 81 MG/1
81 TABLET, CHEWABLE ORAL DAILY
Status: DISCONTINUED | OUTPATIENT
Start: 2017-11-14 | End: 2017-11-14 | Stop reason: HOSPADM

## 2017-11-13 RX ORDER — PANTOPRAZOLE SODIUM 40 MG/1
40 TABLET, DELAYED RELEASE ORAL DAILY
Status: DISCONTINUED | OUTPATIENT
Start: 2017-11-14 | End: 2017-11-14 | Stop reason: HOSPADM

## 2017-11-13 RX ORDER — METOPROLOL SUCCINATE 100 MG/1
100 TABLET, EXTENDED RELEASE ORAL DAILY
Status: DISCONTINUED | OUTPATIENT
Start: 2017-11-14 | End: 2017-11-14 | Stop reason: HOSPADM

## 2017-11-13 RX ORDER — PREDNISONE 10 MG/1
10 TABLET ORAL DAILY
Status: ON HOLD | COMMUNITY
End: 2018-11-16

## 2017-11-13 RX ADMIN — SODIUM CHLORIDE: 9 INJECTION, SOLUTION INTRAVENOUS at 22:58

## 2017-11-13 RX ADMIN — SODIUM CHLORIDE: 9 INJECTION, SOLUTION INTRAVENOUS at 12:05

## 2017-11-13 NOTE — PROGRESS NOTES
Small amount, quarter size bleeding at site. Manual pressure applied for 10 minutes and new quickcot and dressing applied.    Site soft but has bruising    Rupa RAMIRZE from [facility] given update

## 2017-11-13 NOTE — CONSULTS
[]Yes    Current Facility-Administered Medications:     0.9 % sodium chloride infusion, , Intravenous, Continuous, Araceli Jonas PA-C    aspirin chewable tablet 324 mg, 324 mg, Oral, Once, SHAKIR Armas-MACRINA    diphenhydrAMINE (BENADRYL) injection 25 mg, 25 mg, Intravenous, Once, SHAKIR Armas-MACRINA    hydrocortisone sodium succinate PF (SOLU-CORTEF) injection 100 mg, 100 mg, Intravenous, Once, Araceli Jonas PA-C    nitroGLYCERIN (NITROSTAT) SL tablet 0.4 mg, 0.4 mg, Sublingual, Q5 Min PRN, Araceli Jonas PA-C    sodium chloride flush 0.9 % injection 10 mL, 10 mL, Intravenous, 2 times per day, SHAKIR Armas-MACRINA    sodium chloride flush 0.9 % injection 10 mL, 10 mL, Intravenous, PRN, Araceli Jonas PA-C  Prior to Admission medications    Medication Sig Start Date End Date Taking? Authorizing Provider   predniSONE (DELTASONE) 10 MG tablet Take 10 mg by mouth daily   Yes Historical Provider, MD   amiodarone (CORDARONE) 200 MG tablet Take 1 tablet by mouth daily 10/17/17  Yes Kevin Rosario MD   valsartan (DIOVAN) 320 MG tablet Take 1 tablet by mouth daily 10/17/17  Yes Kevin Rosario MD   folic acid (FOLVITE) 1 MG tablet Take 1 tablet by mouth daily 3/10/17  Yes Kevin Rosario MD   metoprolol succinate (TOPROL XL) 100 MG extended release tablet Take 1 tablet by mouth daily 3/10/17  Yes Kevin Rosario MD   pantoprazole (PROTONIX) 40 MG tablet Take 1 tablet by mouth daily 3/10/17  Yes Kevin Rosario MD   Multiple Vitamin (MULTIVITAMIN) tablet Take 1 tablet by mouth daily 3/6/17  Yes Mercedez Merchant MD   vitamin B-1 (THIAMINE) 100 MG tablet Take 100 mg by mouth daily   Yes Historical Provider, MD   aspirin 81 MG EC tablet Take 81 mg by mouth daily. Yes Historical Provider, MD   nitroGLYCERIN (NITROSTAT) 0.4 MG SL tablet Place 1 tablet under the tongue every 5 minutes as needed for Chest pain up to max of 3 total doses.  If no relief after 1 dose, call 911. 10/17/17

## 2017-11-13 NOTE — PROCEDURES
[address] [place] [address]                              CARDIAC CATHETERIZATION    PATIENT NAME: Fermín Aleman                  :        1950  MED REC NO:   [id number]                           ROOM:       0009  ACCOUNT NO:   [de-identified]                           ADMIT DATE: 2017  PROVIDER:     Analilia Hayden    DATE OF PROCEDURE:  2017    PROCEDURE:  Cardiac catheterization. INDICATION:  CCS class III angina with positive functional study. DESCRIPTION OF PROCEDURE:  After informed consent was obtained from the  patient, he was brought to the cardiac catheterization laboratory and  prepped in sterile fashion. Right femoral artery access was chosen as the  primary point of access. The access, diagnostic catheters, and then the  coronary angiogram was performed by Dr. Andreia Jett. Please see his note for  further information. Briefly, the patient demonstrated 90% mid LAD  stenosis. He also demonstrated an 80% to 90% proximal RCA stenosis. It  was decided to intervene upon these lesions due to these symptoms and the  positive functional study. INTERVENTION:  The 5-Citizen of [place] femoral artery sheath was switched off for a  6-Citizen of [place] standard sheath. Angiomax was given in IV bolus and drip form. ACT was confirmed to be over 250 seconds. We used an EBU 3.75 6-Citizen of [place]  guiding catheter to cannulate the left main without complications. Runthrough wire was passed through the lesion without complications to the  distal LAD. We predilated with a 2.5 x 12 NC balloon at 10 atmospheres  with good expansion of the balloon. We then stented the lesion with a 3.0  x 33 Xience CINDY at 10 atmospheres. This was postdilated with a 3.0 x 15 NC  distally at 18 to 20 atmospheres. We postdilated the stent with a 3.5 x 12  NC at 18 to 20 atmospheres proximally.   Post PCI angiography demonstrated  EDITH-3 flow with no thrombosis,
ulcerated. CONCLUSION:  1. Significant 2-vessel coronary artery disease involving the LAD and RCA. 2.  Cardiomyopathy. 3.  No complications. RECOMMENDATIONS:  At this point, case was discussed with Dr. Helen Caputo. Plan  was to proceed with angioplasty and stenting of the LAD and RCA which will  be dictated in a separate report.         Marli Bruce M.D.    D: 11/13/2017 14:04:50       T: 11/13/2017 14:37:14     [email or web address]  Job#: [id number]     Doc#: [id number]

## 2017-11-14 ENCOUNTER — TELEPHONE (OUTPATIENT)
Dept: CARDIOLOGY CLINIC | Age: 67
End: 2017-11-14

## 2017-11-14 VITALS
RESPIRATION RATE: 18 BRPM | TEMPERATURE: 98.6 F | WEIGHT: 154.9 LBS | HEART RATE: 71 BPM | HEIGHT: 69 IN | DIASTOLIC BLOOD PRESSURE: 79 MMHG | BODY MASS INDEX: 22.94 KG/M2 | SYSTOLIC BLOOD PRESSURE: 133 MMHG | OXYGEN SATURATION: 95 %

## 2017-11-14 LAB
ANION GAP SERPL CALCULATED.3IONS-SCNC: 13 MEQ/L (ref 8–16)
BUN BLDV-MCNC: 16 MG/DL (ref 7–22)
CALCIUM SERPL-MCNC: 8.5 MG/DL (ref 8.5–10.5)
CHLORIDE BLD-SCNC: 93 MEQ/L (ref 98–111)
CO2: 21 MEQ/L (ref 23–33)
CREAT SERPL-MCNC: 1.1 MG/DL (ref 0.4–1.2)
GFR SERPL CREATININE-BSD FRML MDRD: 67 ML/MIN/1.73M2
GLUCOSE BLD-MCNC: 83 MG/DL (ref 70–108)
HCT VFR BLD CALC: 38.1 % (ref 42–52)
HEMOGLOBIN: 13.2 GM/DL (ref 14–18)
MCH RBC QN AUTO: 35.2 PG (ref 27–31)
MCHC RBC AUTO-ENTMCNC: 34.6 GM/DL (ref 33–37)
MCV RBC AUTO: 101.6 FL (ref 80–94)
PDW BLD-RTO: 14.9 % (ref 11.5–14.5)
PLATELET # BLD: 163 THOU/MM3 (ref 130–400)
PMV BLD AUTO: 7.8 MCM (ref 7.4–10.4)
POTASSIUM SERPL-SCNC: 4.5 MEQ/L (ref 3.5–5.2)
RBC # BLD: 3.75 MILL/MM3 (ref 4.7–6.1)
SODIUM BLD-SCNC: 127 MEQ/L (ref 135–145)
WBC # BLD: 4.5 THOU/MM3 (ref 4.8–10.8)

## 2017-11-14 PROCEDURE — 6370000000 HC RX 637 (ALT 250 FOR IP): Performed by: INTERNAL MEDICINE

## 2017-11-14 PROCEDURE — 36415 COLL VENOUS BLD VENIPUNCTURE: CPT

## 2017-11-14 PROCEDURE — 85027 COMPLETE CBC AUTOMATED: CPT

## 2017-11-14 PROCEDURE — 99217 PR OBSERVATION CARE DISCHARGE MANAGEMENT: CPT | Performed by: NURSE PRACTITIONER

## 2017-11-14 PROCEDURE — 80048 BASIC METABOLIC PNL TOTAL CA: CPT

## 2017-11-14 RX ORDER — ATORVASTATIN CALCIUM 80 MG/1
80 TABLET, FILM COATED ORAL NIGHTLY
Status: DISCONTINUED | OUTPATIENT
Start: 2017-11-14 | End: 2017-11-14 | Stop reason: HOSPADM

## 2017-11-14 RX ORDER — CLOPIDOGREL BISULFATE 75 MG/1
75 TABLET ORAL DAILY
Qty: 30 TABLET | Refills: 3 | Status: SHIPPED | OUTPATIENT
Start: 2017-11-14 | End: 2018-01-02 | Stop reason: SDUPTHER

## 2017-11-14 RX ORDER — ATORVASTATIN CALCIUM 80 MG/1
80 TABLET, FILM COATED ORAL NIGHTLY
Qty: 30 TABLET | Refills: 3 | Status: SHIPPED | OUTPATIENT
Start: 2017-11-14 | End: 2018-01-02 | Stop reason: SDUPTHER

## 2017-11-14 RX ADMIN — PANTOPRAZOLE SODIUM 40 MG: 40 TABLET, DELAYED RELEASE ORAL at 09:28

## 2017-11-14 RX ADMIN — ASPIRIN 81 MG: 81 TABLET, CHEWABLE ORAL at 09:28

## 2017-11-14 RX ADMIN — CLOPIDOGREL 75 MG: 75 TABLET, FILM COATED ORAL at 09:28

## 2017-11-14 RX ADMIN — THERA TABS 1 TABLET: TAB at 09:28

## 2017-11-14 RX ADMIN — Medication 100 MG: at 09:28

## 2017-11-14 RX ADMIN — AMIODARONE HYDROCHLORIDE 200 MG: 200 TABLET ORAL at 09:28

## 2017-11-14 RX ADMIN — VALSARTAN 320 MG: 320 TABLET ORAL at 09:28

## 2017-11-14 RX ADMIN — METOPROLOL SUCCINATE 100 MG: 100 TABLET, FILM COATED, EXTENDED RELEASE ORAL at 09:28

## 2017-11-14 RX ADMIN — FOLIC ACID 1 MG: 1 TABLET ORAL at 09:28

## 2017-11-14 RX ADMIN — FLUTICASONE PROPIONATE 1 SPRAY: 50 SPRAY, METERED NASAL at 09:28

## 2017-11-14 NOTE — PROGRESS NOTES
Cardiology Progress Note      Patient:  Gregor Sicard  YOB: 1950  MRN: [id number]   Acct: [de-identified]  Admit Date:  11/13/2017  Primary Cardiologist: Hope Hernandez MD    Chief Complaint:   Presented for OP elective cardiac cath  S\p Successful PCI of the LAD with a 3.0 x 33 Xience CINDY; successful PCI of the RCA with 3.0 x 33 Xience CINDY. 11/13/2017    Subjective (Events in last 24 hours):      No chest tightness since PCI  No SOB    VSS  Pace rhythm    He understands to take DAPT     RT groin bruised with ecchymosis - no hematoma - PPP        Objective:   BP (!) 146/90   Pulse 70   Temp 98.3 °F (36.8 °C) (Oral)   Resp 18   Ht 5' 9\" (1.753 m)   Wt 154 lb 14.4 oz (70.3 kg)   SpO2 97%   BMI 22.87 kg/m²        TELEMETRY: Paced    Physical Exam:  General Appearance: alert and oriented to person, place and time, in no acute distress  Cardiovascular: normal rate, regular rhythm, normal S1 and S2, no murmurs, rubs, clicks, or gallops, distal pulses intact,   Pulmonary/Chest: clear to auscultation bilaterally- no wheezes, rales or rhonchi, normal air movement, no respiratory distress  Abdomen: soft, non-tender, non-distended, normal bowel sounds, no masses Extremities: no cyanosis, clubbing or edema, pulses present   Skin: warm and dry, no rash or erythema  Head: normocephalic and atraumatic   Musculoskeletal: normal range of motion, no joint swelling, deformity or tenderness  Neurological: alert, oriented, normal speech, no focal findings or movement disorder noted    Medications:    [START ON 11/15/2017] pneumococcal 13-valent conjugate  0.5 mL Intramuscular Once    [START ON 11/15/2017] influenza virus vaccine  0.5 mL Intramuscular Once    aspirin  324 mg Oral Once    diphenhydrAMINE  25 mg Intravenous Once    hydrocortisone sodium succinate PF  100 mg Intravenous Once    sodium chloride flush  10 mL Intravenous 2 times per day    aspirin  81 mg Oral Daily    clopidogrel  75 mg Oral Daily  vitamin B-1  100 mg Oral Daily    multivitamin  1 tablet Oral Daily    fluticasone  1 spray Nasal Daily    folic acid  1 mg Oral Daily    metoprolol succinate  100 mg Oral Daily    pantoprazole  40 mg Oral Daily    amiodarone  200 mg Oral Daily    valsartan  320 mg Oral Daily      sodium chloride 75 mL/hr at 11/13/17 2258       nitroGLYCERIN 0.4 mg Q5 Min PRN   sodium chloride flush 10 mL PRN   acetaminophen 650 mg Q4H PRN   magnesium hydroxide 30 mL Daily PRN   ondansetron 4 mg Q6H PRN   nitroGLYCERIN 0.4 mg Q5 Min PRN   LORazepam 1 mg Q4H PRN       Diagnostics:  EKG:  Paced    Echo: [facility]  2/8/2017  Normal EF  Mod MR      Stress: [facility] abnormal    Left Heart Cath:  HEMODYNAMIC RESULTS AND LEFT VENTRICULOGRAM:  Left ventricular  end-diastolic pressure was 16 mmHg. No significant change before and after  contrast injection. No significant gradient across the aortic valve to  signify aortic stenosis. Left ventricular function was abnormal, EF 45%.     CORONARY ANGIOGRAM RESULTS:  1. Left main is patent, gives rise to left anterior descending and left  circumflex artery. 2.  Left anterior descending artery has significant stenosis in the mid  segment about 90% with what looks like an unstable plaque. 3.  Left circumflex artery is patent and nondominant. 4.  Right coronary artery is large, dominant, has a long segment of  significant stenosis and a proximal segment about 90%, seems to be also  unstable and ulcerated      SUMMARY:  Successful PCI of the LAD with a 3.0 x 33 Xience CINDY; successful PCI of the RCA with 3.0 x 33 Xience CINDY.    PLAN:  1. Load her with DAPT (600 mg of Plavix and 324 mg of aspirin p.o.). 2.  Continue Plavix 75 mg q. day, 81 mg of aspirin q. day. 3.  Optimal medical therapy. 4.  Risk factor management. 5.  Bedrest with overnight observation.   6.  If no issues overnight, discharge with follow up with Dr. Juan Levy or  myself in 2 weeks.     All the above was explained to the patient and the patient's family. They  were agreeable and amenable to the plan.           FADI STEWART     D: 11/13/2017    Lab Data:    Cardiac Enzymes:  No results for input(s): CKTOTAL, CKMB, CKMBINDEX, TROPONINI in the last 72 hours. CBC:   Lab Results   Component Value Date    WBC 4.5 11/14/2017    RBC 3.75 11/14/2017    HGB 13.2 11/14/2017    HCT 38.1 11/14/2017     11/14/2017       CMP:  Lab Results   Component Value Date     11/14/2017    K 4.5 11/14/2017    CL 93 11/14/2017    CO2 21 11/14/2017    BUN 16 11/14/2017    CREATININE 1.1 11/14/2017    LABGLOM 67 11/14/2017    GLUCOSE 83 11/14/2017    CALCIUM 8.5 11/14/2017       Hepatic Function Panel:  Lab Results   Component Value Date    ALKPHOS 67 03/04/2017    ALT 20 03/04/2017    AST 28 03/04/2017    PROT 6.2 03/04/2017    BILITOT 0.5 03/04/2017    LABALBU 3.4 03/04/2017       Magnesium:    Lab Results   Component Value Date    MG 2.1 03/04/2017       PT/INR:    Lab Results   Component Value Date    INR 0.97 11/13/2017       HgBA1c:    Lab Results   Component Value Date    LABA1C 4.4 03/04/2017       FLP:  Lab Results   Component Value Date    TRIG 110 11/13/2017    HDL 95 11/13/2017    LDLCALC 83 11/13/2017       TSH:  No results found for: TSH      Assessment:    CAD: s\p Successful PCI of the LAD with a 3.0 x 33 Xience CINDY; successful PCI of the RCA with 3.0 x 33 Xience CINDY 11/13/2017    ICDMP EF 60% ech 2/2017   BV ICD    ? AFB- on amiodarone    HTN  HLP    DEAF    ETOH abuse    Hx medical noncompliance      Plan:    Reinforced compliance with DAPT - follow up cardio 2 weeks    Cardiac Rehab: Yes    Follow-up visits:   No follow-up provider specified.      Discharge condition: stable  Disposition: Home  Time spent on discharge: <30 minutes      Discharge Medications for PCI/MI (performed or attempted):   · ASA: yes  · Statin: yes  · P2Y12 Inhibitor: yes  · Beta Blocker: yes  · Nitro SL: yes      Discharge Medications for ICD,

## 2017-11-14 NOTE — TELEPHONE ENCOUNTER
11/14/2017 [facility] d/c'd 11/14/2017 after chest pain and stent placement. Hospital said patient needs seen by Dr. Gabe Davies in 2 weeks in [facility] office. His first available is 01/23/2018. Please advise patient at [phone]. Patient is deaf and mute. da

## 2017-11-14 NOTE — PLAN OF CARE
Problem: Discharge Planning:  Goal: Discharged to appropriate level of care  Discharged to appropriate level of care   Outcome: Ongoing  Patient plans to return home at discharge. Patient following plan of care to be discharged at appropriate length of stay. Problem: Tissue Perfusion - Peripheral, Altered:  Goal: Absence of hematoma at arterial access site  Absence of hematoma at arterial access site   Outcome: Ongoing  Pt does not display hematoma formation this shift, does have bruising since cath. RN continues to monitor site for further changes  Goal: Circulatory function of lower extremities is within specified parameters  Circulatory function of lower extremities is within specified parameters   Outcome: Ongoing  Pt blood pressure and vitals being monitored frequently, lab work being addressed with physician if not within normal limits or critical.      Problem: Pain:  Goal: Recognizes and communicates pain  Recognizes and communicates pain  Outcome: Ongoing  Patient has denied any pain this shift, RN continues to assess    Problem: Tissue Perfusion - Cardiopulmonary, Altered:  Goal: Absence of angina  Absence of angina  Outcome: Ongoing  Patient displays no intermittant angina this shift, RN continues to monitor      Comments: Care plan reviewed with patient . Patient verbalize understanding of the plan of care and contribute to goal setting.

## 2018-01-02 ENCOUNTER — OFFICE VISIT (OUTPATIENT)
Dept: CARDIOLOGY CLINIC | Age: 68
End: 2018-01-02
Payer: MEDICARE

## 2018-01-02 VITALS
SYSTOLIC BLOOD PRESSURE: 138 MMHG | HEIGHT: 69 IN | WEIGHT: 156 LBS | BODY MASS INDEX: 23.11 KG/M2 | DIASTOLIC BLOOD PRESSURE: 98 MMHG

## 2018-01-02 DIAGNOSIS — Z95.810 BIVENTRICULAR IMPLANTABLE CARDIOVERTER-DEFIBRILLATOR IN SITU: Primary | ICD-10-CM

## 2018-01-02 DIAGNOSIS — I25.5 ISCHEMIC CARDIOMYOPATHY: ICD-10-CM

## 2018-01-02 DIAGNOSIS — Z98.61 S/P PTCA (PERCUTANEOUS TRANSLUMINAL CORONARY ANGIOPLASTY): ICD-10-CM

## 2018-01-02 PROCEDURE — 93000 ELECTROCARDIOGRAM COMPLETE: CPT | Performed by: NUCLEAR MEDICINE

## 2018-01-02 PROCEDURE — 99213 OFFICE O/P EST LOW 20 MIN: CPT | Performed by: NUCLEAR MEDICINE

## 2018-01-02 RX ORDER — METOPROLOL SUCCINATE 100 MG/1
100 TABLET, EXTENDED RELEASE ORAL SEE ADMIN INSTRUCTIONS
Qty: 135 TABLET | Refills: 1 | Status: SHIPPED | OUTPATIENT
Start: 2018-01-02 | End: 2018-12-21 | Stop reason: SDUPTHER

## 2018-01-02 RX ORDER — CLOPIDOGREL BISULFATE 75 MG/1
75 TABLET ORAL DAILY
Qty: 90 TABLET | Refills: 3 | Status: SHIPPED | OUTPATIENT
Start: 2018-01-02 | End: 2019-05-15 | Stop reason: SDUPTHER

## 2018-01-02 RX ORDER — METOPROLOL SUCCINATE 100 MG/1
100 TABLET, EXTENDED RELEASE ORAL SEE ADMIN INSTRUCTIONS
COMMUNITY
End: 2018-01-02 | Stop reason: SDUPTHER

## 2018-01-02 RX ORDER — ATORVASTATIN CALCIUM 80 MG/1
80 TABLET, FILM COATED ORAL NIGHTLY
Qty: 90 TABLET | Refills: 3 | Status: SHIPPED | OUTPATIENT
Start: 2018-01-02 | End: 2019-04-30 | Stop reason: ALTCHOICE

## 2018-01-02 NOTE — PROGRESS NOTES
S1 S2, Slight murmur. no rubs, no gallops  Abdomen:   Soft, non tender, no organomegalies, positive bowel sounds  Extremities:   No edema, no cyanosis, good peripheral pulses  Neurological:   Awake, alert, oriented. No obvious focal deficits  Musculoskelatal:  No obvious deformities    Assessment:     1. Medtronic BiV ICD-no atrial lead  EKG 12 Lead   2. S/P PTCA (percutaneous transluminal coronary angioplasty)  EKG 12 Lead   3. Ischemic cardiomyopathy     cardiac stable for now  Need a better BP  ECG in office was done today. I reviewed the ECG. No acute findings      Plan:  No Follow-up on file. Increase metoprolol 100 am and 50 pm   Monitor BP  Continue risk factor modification and medical management  Thank you for allowing me to participate in the care of your patient. Please don't hesitate to contact me regarding any further issues related to the patient care    Orders Placed:  Orders Placed This Encounter   Procedures    EKG 12 Lead     Order Specific Question:   Reason for Exam?     Answer: Other       Medications Prescribed:  No orders of the defined types were placed in this encounter. Discussed use, benefit, and side effects of prescribed medications. All patient questions answered. Pt voiced understanding. Instructed to continue current medications, diet and exercise. Continue risk factor modification and medical management. Patient agreed with treatment plan. Follow up as directed.     Electronically signed by Evi Tuttle MD on 1/2/2018 at 2:50 PM

## 2018-01-29 ENCOUNTER — PROCEDURE VISIT (OUTPATIENT)
Dept: CARDIOLOGY CLINIC | Age: 68
End: 2018-01-29
Payer: MEDICARE

## 2018-01-29 DIAGNOSIS — Z95.810 BIVENTRICULAR IMPLANTABLE CARDIOVERTER-DEFIBRILLATOR IN SITU: Primary | ICD-10-CM

## 2018-01-29 DIAGNOSIS — I49.01 VENTRICULAR FIBRILLATION (HCC): ICD-10-CM

## 2018-01-29 PROCEDURE — 93296 REM INTERROG EVL PM/IDS: CPT | Performed by: INTERNAL MEDICINE

## 2018-01-29 PROCEDURE — 93295 DEV INTERROG REMOTE 1/2/MLT: CPT | Performed by: INTERNAL MEDICINE

## 2018-01-29 NOTE — PROGRESS NOTES
ICD medtronic carelink 1/29/18 (Bi V  ICD, no atrial lead)  Dr Francine Kowalski  Patient  4.5years battery life  Venticular Paced 97.1%  RV Waves 2.8mV  Impedence  , , shock 68  optival wnl

## 2018-04-03 ENCOUNTER — NURSE ONLY (OUTPATIENT)
Dept: CARDIOLOGY CLINIC | Age: 68
End: 2018-04-03
Payer: MEDICARE

## 2018-04-03 DIAGNOSIS — Z95.810 BIVENTRICULAR IMPLANTABLE CARDIOVERTER-DEFIBRILLATOR IN SITU: Primary | ICD-10-CM

## 2018-04-03 PROCEDURE — 93283 PRGRMG EVAL IMPLANTABLE DFB: CPT | Performed by: NUCLEAR MEDICINE

## 2018-07-10 ENCOUNTER — OFFICE VISIT (OUTPATIENT)
Dept: CARDIOLOGY CLINIC | Age: 68
End: 2018-07-10
Payer: MEDICARE

## 2018-07-10 VITALS
WEIGHT: 153 LBS | HEIGHT: 69 IN | DIASTOLIC BLOOD PRESSURE: 70 MMHG | SYSTOLIC BLOOD PRESSURE: 118 MMHG | HEART RATE: 80 BPM | BODY MASS INDEX: 22.66 KG/M2

## 2018-07-10 DIAGNOSIS — I25.10 CORONARY ARTERY DISEASE INVOLVING NATIVE CORONARY ARTERY OF NATIVE HEART WITHOUT ANGINA PECTORIS: Primary | ICD-10-CM

## 2018-07-10 DIAGNOSIS — I10 ESSENTIAL HYPERTENSION: ICD-10-CM

## 2018-07-10 DIAGNOSIS — E78.01 FAMILIAL HYPERCHOLESTEROLEMIA: ICD-10-CM

## 2018-07-10 PROCEDURE — G8598 ASA/ANTIPLAT THER USED: HCPCS | Performed by: NUCLEAR MEDICINE

## 2018-07-10 PROCEDURE — 1036F TOBACCO NON-USER: CPT | Performed by: NUCLEAR MEDICINE

## 2018-07-10 PROCEDURE — 4040F PNEUMOC VAC/ADMIN/RCVD: CPT | Performed by: NUCLEAR MEDICINE

## 2018-07-10 PROCEDURE — 99213 OFFICE O/P EST LOW 20 MIN: CPT | Performed by: NUCLEAR MEDICINE

## 2018-07-10 PROCEDURE — 1123F ACP DISCUSS/DSCN MKR DOCD: CPT | Performed by: NUCLEAR MEDICINE

## 2018-07-10 PROCEDURE — G8420 CALC BMI NORM PARAMETERS: HCPCS | Performed by: NUCLEAR MEDICINE

## 2018-07-10 PROCEDURE — 3017F COLORECTAL CA SCREEN DOC REV: CPT | Performed by: NUCLEAR MEDICINE

## 2018-07-10 PROCEDURE — G8427 DOCREV CUR MEDS BY ELIG CLIN: HCPCS | Performed by: NUCLEAR MEDICINE

## 2018-07-10 NOTE — PROGRESS NOTES
[facility]  [address] [facility] [address] [address]  [facility] [address]  Dept: [phone]  Dept Fax: [phone]  Loc: [phone]    Visit Date: 7/10/2018    Gallo Betts is a 76 y.o. male who presents today for:  Chief Complaint   Patient presents with    Check-Up    Coronary Artery Disease    Hypertension    Hyperlipidemia   no chest pain  Stented the LAd and RCA  No new symptoms  Still drinking  compliacne issues  No ICD shocks        HPI:  HPI  Past Medical History:   Diagnosis Date    CHARISSA (acute kidney injury) ([address]     Arthritis     general    Blood circulation, collateral     CAD (coronary artery disease)     Cardiomyopathy ([address] 8/13/2014    Chronic atrial fibrillation ([address] 8/13/2014    Chronic kidney disease     Congenital heart disease     Deafness congenital 8/13/2014    ETOH abuse 8/13/2014    GERD (gastroesophageal reflux disease)     Hyperlipidemia     Hypertension     Medical non-compliance 8/13/2014    Medtronic BiV ICD implanted 9/18/14 [facility] 10/8/2014    Medtronic BiV ICD-no atrial lead 10/8/2014      Past Surgical History:   Procedure Laterality Date    CARDIAC DEFIBRILLATOR PLACEMENT  2005    PACEMAKER INSERTION  2005    PROSTATE SURGERY  2011    PVP     Family History   Problem Relation Age of Onset    Diabetes Mother     Arthritis Mother     Heart Disease Father     High Blood Pressure Father     High Cholesterol Father     Arthritis Father      Social History   Substance Use Topics    Smoking status: Never Smoker    Smokeless tobacco: Never Used    Alcohol use 9.6 oz/week     16 Cans of beer per week      Comment: 16 cans daily      Current Outpatient Prescriptions   Medication Sig Dispense Refill    atorvastatin (LIPITOR) 80 MG tablet Take 1 tablet by mouth nightly 90 tablet 3    clopidogrel (PLAVIX) 75 MG tablet Take 1 tablet by mouth daily 90 tablet 3    metoprolol succinate (TOPROL XL) 100 MG extended release

## 2018-07-17 ENCOUNTER — PROCEDURE VISIT (OUTPATIENT)
Dept: CARDIOLOGY CLINIC | Age: 68
End: 2018-07-17
Payer: MEDICARE

## 2018-07-17 DIAGNOSIS — Z95.810 BIVENTRICULAR IMPLANTABLE CARDIOVERTER-DEFIBRILLATOR IN SITU: Primary | ICD-10-CM

## 2018-07-17 PROCEDURE — 93295 DEV INTERROG REMOTE 1/2/MLT: CPT | Performed by: INTERNAL MEDICINE

## 2018-07-17 PROCEDURE — 93296 REM INTERROG EVL PM/IDS: CPT | Performed by: INTERNAL MEDICINE

## 2018-08-07 ENCOUNTER — TELEPHONE (OUTPATIENT)
Dept: CARDIOLOGY CLINIC | Age: 68
End: 2018-08-07

## 2018-08-07 RX ORDER — IRBESARTAN 300 MG/1
300 TABLET ORAL NIGHTLY
COMMUNITY
End: 2018-08-07 | Stop reason: SDUPTHER

## 2018-08-07 RX ORDER — IRBESARTAN 300 MG/1
300 TABLET ORAL NIGHTLY
Qty: 90 TABLET | Refills: 3 | Status: SHIPPED | OUTPATIENT
Start: 2018-08-07 | End: 2019-04-17 | Stop reason: ALTCHOICE

## 2018-10-05 ENCOUNTER — TELEPHONE (OUTPATIENT)
Dept: CARDIOLOGY CLINIC | Age: 68
End: 2018-10-05

## 2018-10-16 ENCOUNTER — PROCEDURE VISIT (OUTPATIENT)
Dept: CARDIOLOGY CLINIC | Age: 68
End: 2018-10-16
Payer: MEDICARE

## 2018-10-16 DIAGNOSIS — Z95.810 BIVENTRICULAR IMPLANTABLE CARDIOVERTER-DEFIBRILLATOR IN SITU: Primary | ICD-10-CM

## 2018-10-16 PROCEDURE — 93295 DEV INTERROG REMOTE 1/2/MLT: CPT | Performed by: INTERNAL MEDICINE

## 2018-10-16 PROCEDURE — 93296 REM INTERROG EVL PM/IDS: CPT | Performed by: INTERNAL MEDICINE

## 2018-10-16 NOTE — PROGRESS NOTES
Medronic bi v icd  No atrial lead  Exactly 91 days from last charge  Battery 3.5 years  V paced 99.7%  r wave 2.8mV  Ventricle impedance  437 ohms  RV 67 ohms  LV Impedance 323 ohms

## 2018-10-17 ENCOUNTER — HOSPITAL ENCOUNTER (OUTPATIENT)
Dept: INTERVENTIONAL RADIOLOGY/VASCULAR | Age: 68
Discharge: HOME | End: 2018-10-17
Payer: MEDICARE

## 2018-10-17 DIAGNOSIS — I73.9 PERIPHERAL VASCULAR DISEASE, UNSPECIFIED (HCC): ICD-10-CM

## 2018-10-17 PROCEDURE — 93923 UPR/LXTR ART STDY 3+ LVLS: CPT

## 2018-10-24 ENCOUNTER — TELEPHONE (OUTPATIENT)
Dept: CARDIOLOGY CLINIC | Age: 68
End: 2018-10-24

## 2018-11-09 NOTE — PROGRESS NOTES
The following instructions left on voicemail:         Patient instructed not to eat or drink anything after midnight the night before surgery. Please bring along on Surgery Day:               -List of medications with the dosages & when you take them. If you do not have a list, bring medications bottles - include prescription & over the counter medications.             -Photo ID             -Insurance information         Take any heart & blood pressure medications that you would normally take in AM with a sip of water. You MUST have a  with you if having a general or MAC anesthetic. Things to leave at home:  Anything valuable, jewelry (rings, watch, piercings) - extra cash. Wear clean comfortable clothing. If having a pain injection please wear pants with elastic waist.         Use antibacterial soap when showering/bathing the night before or morning of surgery. The Surgery Center is location in [address] in the [address] [facility]. Following any instructions given to you by your surgeon, including the arrival time. Please return this call @ [phone] Monday thru Friday 7:00am to 3:30pm to complete your medical history.

## 2018-11-13 ENCOUNTER — ANESTHESIA EVENT (OUTPATIENT)
Dept: OPERATING ROOM | Age: 68
End: 2018-11-13
Payer: MEDICARE

## 2018-11-16 ENCOUNTER — ANESTHESIA (OUTPATIENT)
Dept: OPERATING ROOM | Age: 68
End: 2018-11-16
Payer: MEDICARE

## 2018-11-16 ENCOUNTER — HOSPITAL ENCOUNTER (OUTPATIENT)
Age: 68
Setting detail: OUTPATIENT SURGERY
Discharge: HOME | End: 2018-11-16
Attending: ORTHOPAEDIC SURGERY | Admitting: ORTHOPAEDIC SURGERY
Payer: MEDICARE

## 2018-11-16 VITALS
SYSTOLIC BLOOD PRESSURE: 110 MMHG | DIASTOLIC BLOOD PRESSURE: 69 MMHG | TEMPERATURE: 97.3 F | RESPIRATION RATE: 1 BRPM | OXYGEN SATURATION: 98 %

## 2018-11-16 VITALS
TEMPERATURE: 97.7 F | HEIGHT: 69 IN | HEART RATE: 70 BPM | SYSTOLIC BLOOD PRESSURE: 116 MMHG | RESPIRATION RATE: 14 BRPM | BODY MASS INDEX: 20.44 KG/M2 | WEIGHT: 138 LBS | OXYGEN SATURATION: 96 % | DIASTOLIC BLOOD PRESSURE: 70 MMHG

## 2018-11-16 PROCEDURE — 7100000011 HC PHASE II RECOVERY - ADDTL 15 MIN: Performed by: ORTHOPAEDIC SURGERY

## 2018-11-16 PROCEDURE — 2580000003 HC RX 258: Performed by: ORTHOPAEDIC SURGERY

## 2018-11-16 PROCEDURE — C1713 ANCHOR/SCREW BN/BN,TIS/BN: HCPCS | Performed by: ORTHOPAEDIC SURGERY

## 2018-11-16 PROCEDURE — 7100000010 HC PHASE II RECOVERY - FIRST 15 MIN: Performed by: ORTHOPAEDIC SURGERY

## 2018-11-16 PROCEDURE — 2709999900 HC NON-CHARGEABLE SUPPLY: Performed by: ORTHOPAEDIC SURGERY

## 2018-11-16 PROCEDURE — 2500000003 HC RX 250 WO HCPCS: Performed by: REGISTERED NURSE

## 2018-11-16 PROCEDURE — 2720000010 HC SURG SUPPLY STERILE: Performed by: ORTHOPAEDIC SURGERY

## 2018-11-16 PROCEDURE — 6360000002 HC RX W HCPCS: Performed by: ORTHOPAEDIC SURGERY

## 2018-11-16 PROCEDURE — 7100000000 HC PACU RECOVERY - FIRST 15 MIN: Performed by: ORTHOPAEDIC SURGERY

## 2018-11-16 PROCEDURE — 2500000003 HC RX 250 WO HCPCS: Performed by: ORTHOPAEDIC SURGERY

## 2018-11-16 PROCEDURE — 3700000000 HC ANESTHESIA ATTENDED CARE: Performed by: ORTHOPAEDIC SURGERY

## 2018-11-16 PROCEDURE — 3700000001 HC ADD 15 MINUTES (ANESTHESIA): Performed by: ORTHOPAEDIC SURGERY

## 2018-11-16 PROCEDURE — 7100000001 HC PACU RECOVERY - ADDTL 15 MIN: Performed by: ORTHOPAEDIC SURGERY

## 2018-11-16 PROCEDURE — 6360000002 HC RX W HCPCS: Performed by: REGISTERED NURSE

## 2018-11-16 PROCEDURE — 3600000004 HC SURGERY LEVEL 4 BASE: Performed by: ORTHOPAEDIC SURGERY

## 2018-11-16 PROCEDURE — 3600000014 HC SURGERY LEVEL 4 ADDTL 15MIN: Performed by: ORTHOPAEDIC SURGERY

## 2018-11-16 DEVICE — LOCKING SCREW, T7
Type: IMPLANTABLE DEVICE | Status: FUNCTIONAL
Brand: VARIAX

## 2018-11-16 DEVICE — CURVED PLATE
Type: IMPLANTABLE DEVICE | Status: FUNCTIONAL
Brand: VARIAX

## 2018-11-16 DEVICE — BONE SCREW, T7
Type: IMPLANTABLE DEVICE | Status: FUNCTIONAL
Brand: VARIAX

## 2018-11-16 RX ORDER — DIPHENHYDRAMINE HYDROCHLORIDE 50 MG/ML
INJECTION INTRAMUSCULAR; INTRAVENOUS PRN
Status: DISCONTINUED | OUTPATIENT
Start: 2018-11-16 | End: 2018-11-16 | Stop reason: SDUPTHER

## 2018-11-16 RX ORDER — OXYCODONE HYDROCHLORIDE AND ACETAMINOPHEN 5; 325 MG/1; MG/1
1 TABLET ORAL EVERY 4 HOURS PRN
Status: DISCONTINUED | OUTPATIENT
Start: 2018-11-16 | End: 2018-11-16 | Stop reason: HOSPADM

## 2018-11-16 RX ORDER — GLYCOPYRROLATE 1 MG/5 ML
SYRINGE (ML) INTRAVENOUS PRN
Status: DISCONTINUED | OUTPATIENT
Start: 2018-11-16 | End: 2018-11-16 | Stop reason: SDUPTHER

## 2018-11-16 RX ORDER — SODIUM CHLORIDE 0.9 % (FLUSH) 0.9 %
10 SYRINGE (ML) INJECTION EVERY 12 HOURS SCHEDULED
Status: DISCONTINUED | OUTPATIENT
Start: 2018-11-16 | End: 2018-11-16 | Stop reason: HOSPADM

## 2018-11-16 RX ORDER — ONDANSETRON 2 MG/ML
4 INJECTION INTRAMUSCULAR; INTRAVENOUS EVERY 6 HOURS PRN
Status: DISCONTINUED | OUTPATIENT
Start: 2018-11-16 | End: 2018-11-16 | Stop reason: HOSPADM

## 2018-11-16 RX ORDER — MORPHINE SULFATE 2 MG/ML
4 INJECTION, SOLUTION INTRAMUSCULAR; INTRAVENOUS
Status: DISCONTINUED | OUTPATIENT
Start: 2018-11-16 | End: 2018-11-16 | Stop reason: HOSPADM

## 2018-11-16 RX ORDER — ONDANSETRON 2 MG/ML
INJECTION INTRAMUSCULAR; INTRAVENOUS PRN
Status: DISCONTINUED | OUTPATIENT
Start: 2018-11-16 | End: 2018-11-16 | Stop reason: SDUPTHER

## 2018-11-16 RX ORDER — EPHEDRINE SULFATE 50 MG/ML
INJECTION INTRAVENOUS PRN
Status: DISCONTINUED | OUTPATIENT
Start: 2018-11-16 | End: 2018-11-16 | Stop reason: SDUPTHER

## 2018-11-16 RX ORDER — MIDAZOLAM HYDROCHLORIDE 1 MG/ML
INJECTION INTRAMUSCULAR; INTRAVENOUS PRN
Status: DISCONTINUED | OUTPATIENT
Start: 2018-11-16 | End: 2018-11-16 | Stop reason: SDUPTHER

## 2018-11-16 RX ORDER — NEOSTIGMINE METHYLSULFATE 5 MG/5 ML
SYRINGE (ML) INTRAVENOUS PRN
Status: DISCONTINUED | OUTPATIENT
Start: 2018-11-16 | End: 2018-11-16 | Stop reason: SDUPTHER

## 2018-11-16 RX ORDER — CEFAZOLIN SODIUM 1 G/50ML
1 INJECTION, SOLUTION INTRAVENOUS ONCE
Status: COMPLETED | OUTPATIENT
Start: 2018-11-16 | End: 2018-11-16

## 2018-11-16 RX ORDER — PROPOFOL 10 MG/ML
INJECTION, EMULSION INTRAVENOUS PRN
Status: DISCONTINUED | OUTPATIENT
Start: 2018-11-16 | End: 2018-11-16 | Stop reason: SDUPTHER

## 2018-11-16 RX ORDER — MORPHINE SULFATE 2 MG/ML
2 INJECTION, SOLUTION INTRAMUSCULAR; INTRAVENOUS
Status: DISCONTINUED | OUTPATIENT
Start: 2018-11-16 | End: 2018-11-16 | Stop reason: HOSPADM

## 2018-11-16 RX ORDER — SODIUM CHLORIDE 9 MG/ML
INJECTION, SOLUTION INTRAVENOUS CONTINUOUS
Status: DISCONTINUED | OUTPATIENT
Start: 2018-11-16 | End: 2018-11-16 | Stop reason: HOSPADM

## 2018-11-16 RX ORDER — LIDOCAINE HYDROCHLORIDE 20 MG/ML
INJECTION, SOLUTION INFILTRATION; PERINEURAL PRN
Status: DISCONTINUED | OUTPATIENT
Start: 2018-11-16 | End: 2018-11-16 | Stop reason: HOSPADM

## 2018-11-16 RX ORDER — DEXAMETHASONE SODIUM PHOSPHATE 4 MG/ML
INJECTION, SOLUTION INTRA-ARTICULAR; INTRALESIONAL; INTRAMUSCULAR; INTRAVENOUS; SOFT TISSUE PRN
Status: DISCONTINUED | OUTPATIENT
Start: 2018-11-16 | End: 2018-11-16 | Stop reason: SDUPTHER

## 2018-11-16 RX ORDER — OXYCODONE HYDROCHLORIDE AND ACETAMINOPHEN 5; 325 MG/1; MG/1
2 TABLET ORAL EVERY 4 HOURS PRN
Status: DISCONTINUED | OUTPATIENT
Start: 2018-11-16 | End: 2018-11-16 | Stop reason: HOSPADM

## 2018-11-16 RX ORDER — FENTANYL CITRATE 50 UG/ML
INJECTION, SOLUTION INTRAMUSCULAR; INTRAVENOUS PRN
Status: DISCONTINUED | OUTPATIENT
Start: 2018-11-16 | End: 2018-11-16 | Stop reason: SDUPTHER

## 2018-11-16 RX ORDER — BUPIVACAINE HYDROCHLORIDE 5 MG/ML
INJECTION, SOLUTION EPIDURAL; INTRACAUDAL PRN
Status: DISCONTINUED | OUTPATIENT
Start: 2018-11-16 | End: 2018-11-16 | Stop reason: HOSPADM

## 2018-11-16 RX ORDER — ACETAMINOPHEN 325 MG/1
650 TABLET ORAL EVERY 4 HOURS PRN
Status: DISCONTINUED | OUTPATIENT
Start: 2018-11-16 | End: 2018-11-16 | Stop reason: HOSPADM

## 2018-11-16 RX ORDER — LIDOCAINE HYDROCHLORIDE 20 MG/ML
INJECTION, SOLUTION EPIDURAL; INFILTRATION; INTRACAUDAL; PERINEURAL PRN
Status: DISCONTINUED | OUTPATIENT
Start: 2018-11-16 | End: 2018-11-16 | Stop reason: SDUPTHER

## 2018-11-16 RX ORDER — ROCURONIUM BROMIDE 10 MG/ML
INJECTION, SOLUTION INTRAVENOUS PRN
Status: DISCONTINUED | OUTPATIENT
Start: 2018-11-16 | End: 2018-11-16 | Stop reason: SDUPTHER

## 2018-11-16 RX ORDER — SODIUM CHLORIDE 0.9 % (FLUSH) 0.9 %
10 SYRINGE (ML) INJECTION PRN
Status: DISCONTINUED | OUTPATIENT
Start: 2018-11-16 | End: 2018-11-16 | Stop reason: HOSPADM

## 2018-11-16 RX ORDER — SODIUM CHLORIDE, SODIUM LACTATE, POTASSIUM CHLORIDE, CALCIUM CHLORIDE 600; 310; 30; 20 MG/100ML; MG/100ML; MG/100ML; MG/100ML
INJECTION, SOLUTION INTRAVENOUS CONTINUOUS
Status: DISCONTINUED | OUTPATIENT
Start: 2018-11-16 | End: 2018-11-16 | Stop reason: HOSPADM

## 2018-11-16 RX ADMIN — PHENYLEPHRINE HYDROCHLORIDE 100 MCG: 10 INJECTION INTRAVENOUS at 13:51

## 2018-11-16 RX ADMIN — MIDAZOLAM HYDROCHLORIDE 2 MG: 1 INJECTION, SOLUTION INTRAMUSCULAR; INTRAVENOUS at 12:44

## 2018-11-16 RX ADMIN — EPHEDRINE SULFATE 20 MG: 50 INJECTION, SOLUTION INTRAVENOUS at 13:33

## 2018-11-16 RX ADMIN — PHENYLEPHRINE HYDROCHLORIDE 200 MCG: 10 INJECTION INTRAVENOUS at 13:06

## 2018-11-16 RX ADMIN — PHENYLEPHRINE HYDROCHLORIDE 100 MCG: 10 INJECTION INTRAVENOUS at 14:04

## 2018-11-16 RX ADMIN — ROCURONIUM BROMIDE 40 MG: 10 INJECTION INTRAVENOUS at 12:48

## 2018-11-16 RX ADMIN — Medication 2 MG: at 14:20

## 2018-11-16 RX ADMIN — SODIUM CHLORIDE: 9 INJECTION, SOLUTION INTRAVENOUS at 13:57

## 2018-11-16 RX ADMIN — LIDOCAINE HYDROCHLORIDE 100 MG: 20 INJECTION, SOLUTION EPIDURAL; INFILTRATION; INTRACAUDAL; PERINEURAL at 12:48

## 2018-11-16 RX ADMIN — EPHEDRINE SULFATE 10 MG: 50 INJECTION, SOLUTION INTRAVENOUS at 13:07

## 2018-11-16 RX ADMIN — FENTANYL CITRATE 75 MCG: 50 INJECTION INTRAMUSCULAR; INTRAVENOUS at 14:21

## 2018-11-16 RX ADMIN — EPHEDRINE SULFATE 10 MG: 50 INJECTION, SOLUTION INTRAVENOUS at 13:14

## 2018-11-16 RX ADMIN — CEFAZOLIN SODIUM 1 G: 1 INJECTION, SOLUTION INTRAVENOUS at 12:53

## 2018-11-16 RX ADMIN — PHENYLEPHRINE HYDROCHLORIDE 100 MCG: 10 INJECTION INTRAVENOUS at 12:56

## 2018-11-16 RX ADMIN — Medication 0.4 MG: at 14:20

## 2018-11-16 RX ADMIN — FENTANYL CITRATE 75 MCG: 50 INJECTION INTRAMUSCULAR; INTRAVENOUS at 13:10

## 2018-11-16 RX ADMIN — DEXAMETHASONE SODIUM PHOSPHATE 4 MG: 4 INJECTION, SOLUTION INTRAMUSCULAR; INTRAVENOUS at 12:51

## 2018-11-16 RX ADMIN — ONDANSETRON HYDROCHLORIDE 4 MG: 4 INJECTION, SOLUTION INTRAMUSCULAR; INTRAVENOUS at 14:10

## 2018-11-16 RX ADMIN — PHENYLEPHRINE HYDROCHLORIDE 100 MCG: 10 INJECTION INTRAVENOUS at 13:21

## 2018-11-16 RX ADMIN — DIPHENHYDRAMINE HYDROCHLORIDE 12.5 MG: 50 INJECTION, SOLUTION INTRAMUSCULAR; INTRAVENOUS at 12:51

## 2018-11-16 RX ADMIN — EPHEDRINE SULFATE 10 MG: 50 INJECTION, SOLUTION INTRAVENOUS at 13:21

## 2018-11-16 RX ADMIN — FENTANYL CITRATE 100 MCG: 50 INJECTION INTRAMUSCULAR; INTRAVENOUS at 12:48

## 2018-11-16 RX ADMIN — PROPOFOL 150 MG: 10 INJECTION, EMULSION INTRAVENOUS at 12:48

## 2018-11-16 RX ADMIN — SODIUM CHLORIDE: 9 INJECTION, SOLUTION INTRAVENOUS at 12:26

## 2018-11-16 RX ADMIN — PHENYLEPHRINE HYDROCHLORIDE 100 MCG: 10 INJECTION INTRAVENOUS at 12:58

## 2018-11-16 ASSESSMENT — PULMONARY FUNCTION TESTS
PIF_VALUE: 1
PIF_VALUE: 14
PIF_VALUE: 14
PIF_VALUE: 2
PIF_VALUE: 13
PIF_VALUE: 13
PIF_VALUE: 4
PIF_VALUE: 15
PIF_VALUE: 14
PIF_VALUE: 14
PIF_VALUE: 16
PIF_VALUE: 13
PIF_VALUE: 4
PIF_VALUE: 15
PIF_VALUE: 1
PIF_VALUE: 17
PIF_VALUE: 14
PIF_VALUE: 13
PIF_VALUE: 13
PIF_VALUE: 4
PIF_VALUE: 15
PIF_VALUE: 13
PIF_VALUE: 22
PIF_VALUE: 14
PIF_VALUE: 13
PIF_VALUE: 15
PIF_VALUE: 15
PIF_VALUE: 14
PIF_VALUE: 13
PIF_VALUE: 15
PIF_VALUE: 14
PIF_VALUE: 15
PIF_VALUE: 14
PIF_VALUE: 15
PIF_VALUE: 15
PIF_VALUE: 13
PIF_VALUE: 15
PIF_VALUE: 4
PIF_VALUE: 14
PIF_VALUE: 14
PIF_VALUE: 15
PIF_VALUE: 13
PIF_VALUE: 14
PIF_VALUE: 20
PIF_VALUE: 14
PIF_VALUE: 15
PIF_VALUE: 1
PIF_VALUE: 16
PIF_VALUE: 14
PIF_VALUE: 14
PIF_VALUE: 15
PIF_VALUE: 13
PIF_VALUE: 14
PIF_VALUE: 15
PIF_VALUE: 15
PIF_VALUE: 4
PIF_VALUE: 15
PIF_VALUE: 15
PIF_VALUE: 4
PIF_VALUE: 14
PIF_VALUE: 15
PIF_VALUE: 12
PIF_VALUE: 15
PIF_VALUE: 20
PIF_VALUE: 14
PIF_VALUE: 14
PIF_VALUE: 1
PIF_VALUE: 4
PIF_VALUE: 15
PIF_VALUE: 13
PIF_VALUE: 14
PIF_VALUE: 12
PIF_VALUE: 16
PIF_VALUE: 13
PIF_VALUE: 1
PIF_VALUE: 1
PIF_VALUE: 16
PIF_VALUE: 15
PIF_VALUE: 15
PIF_VALUE: 16
PIF_VALUE: 13
PIF_VALUE: 15
PIF_VALUE: 13
PIF_VALUE: 4
PIF_VALUE: 14
PIF_VALUE: 15
PIF_VALUE: 15
PIF_VALUE: 14
PIF_VALUE: 13
PIF_VALUE: 4
PIF_VALUE: 15
PIF_VALUE: 14
PIF_VALUE: 15
PIF_VALUE: 14
PIF_VALUE: 1
PIF_VALUE: 15
PIF_VALUE: 13
PIF_VALUE: 15
PIF_VALUE: 14
PIF_VALUE: 14
PIF_VALUE: 12
PIF_VALUE: 15
PIF_VALUE: 14
PIF_VALUE: 14

## 2018-11-16 ASSESSMENT — PAIN SCALES - GENERAL
PAINLEVEL_OUTOF10: 0

## 2018-11-16 ASSESSMENT — PAIN DESCRIPTION - PAIN TYPE: TYPE: SURGICAL PAIN

## 2018-11-16 ASSESSMENT — PAIN DESCRIPTION - LOCATION: LOCATION: FOOT

## 2018-11-16 ASSESSMENT — PAIN DESCRIPTION - ORIENTATION: ORIENTATION: RIGHT

## 2018-11-16 NOTE — H&P
[address] [place] [address]                       PREOPERATIVE HISTORY AND PHYSICAL    PATIENT NAME: Candi Guardado                  :        1950  MED REC NO:   [id number]                           ROOM:  ACCOUNT NO:   [de-identified]                           ADMIT DATE: 2018  PROVIDER:     Slime Fairchild. Amelia Javier MD    TODAY'S DATE:  2018. CHIEF COMPLAINT:  Right first metatarsophalangeal joint pain, consistent  with right first MTP joint grade 3 hallux rigidus. PLANNED PROCEDURE:  Right first MTP joint arthrodesis. HISTORY OF PRESENT ILLNESS:  The patient is a 69-year-old male who is  deaf and mute, has significant pain, right first metatarsophalangeal  joint, several-year duration, at this point feels debilitated by the  pain, and at this point, he elected to proceed with a first  metatarsophalangeal joint arthrodesis. All the risks have been  explained to him and he wished to proceed. Risks include numbness over  the incision, numbness in the portion of the foot, infection, DVT,  problems with wound healing, nonunion, need for hardware removal, or  transfer lesions. PAST MEDICAL HISTORY:  Positive for alcoholism, heart condition,  rheumatoid arthritis, heart attack, pacemaker, hypertension, chronic  back pain, irregular heartbeat, heart stents, defibrillator. ALLERGIES:  No allergy to metal.  No allergy to latex. No allergies to  foods. No allergies to medications. MEDICATIONS:  Please see chart for details. Medications include  aspirin, Protonix, vitamin B-100, amiodarone, Lipitor, Plavix, Flonase,  folic acid, metoprolol succinate, _____ vitamin, multivitamin,  Nitrostat, prednisone, Diovan, and tramadol. FAMILY HISTORY:  Positive for heart disease, hypertension, cancer, and  heart trouble. SOCIAL HISTORY:  Negative for smoking. Positive for daily ethanol use.     REVIEW OF

## 2018-11-16 NOTE — PROGRESS NOTES
Discharge instructions discussed with the patient and daughter. Verbalized understanding. Discharge complete. Off unit by w/c to car.

## 2018-11-26 NOTE — OP NOTE
[address] [id number]                                OPERATIVE REPORT    PATIENT NAME: Swapnil Sands                  :        1950  MED REC NO:   [id number]                           ROOM:  ACCOUNT NO:   [de-identified]                           ADMIT DATE: 2018  PROVIDER:     Yunier Soria. Richard Meehan MD    DATE OF PROCEDURE:  2018    SURGEON:  Yunier Soria. Richard Meehan MD    ASSISTANT SURGEON:  Mamadou Ware DPM, PGY-1    PREOPERATIVE DIAGNOSES:  1. Right first MTP joint hallux rigidus. 2.  Right first MTP joint pain. POSTOPERATIVE DIAGNOSES:  1. Right first MTP joint hallux rigidus. 2.  Right first MTP joint pain. OPERATION PERFORMED:  Right first MTP joint arthrodesis. COMPLICATIONS:  None. SPECIMENS SENT:  None. TIME OUT:  Performed. SITE:  Signed. PREOPERATIVE ANTIBIOTICS:  Given. ESTIMATED BLOOD LOSS:  Trace. HISTORY AND INDICATIONS:  The patient is a very pleasant 70-year-old  male with recalcitrant right first metatarsophalangeal joint pain,  elected to proceed with surgery. Risks and benefits explained to him  and wished to proceed. PROCEDURE AND FINDINGS:  The patient was seen in the preoperative area. Consent checked. Leg marked, indicated wish to proceed, went back to  the operating room, transferred to the operating room table. General  anesthetic induced, good analgesia throughout the case. The right foot  was prepped and draped in appropriate manner. Timeout was performed. Confirmed drug allergies. Confirmed the patient's preoperative  antibiotics. Elected to proceed. Webril was placed on the  supramalleolar area. Foot was exsanguinated with an Esmarch. At the  conclusion of the case, Esmarch was let down. Good capillary refill and  sensation returned to the toes.   Incision was made directly in the  midline on the first metatarsophalangeal joint down through the

## 2018-12-24 RX ORDER — NITROGLYCERIN 0.4 MG/1
0.4 TABLET SUBLINGUAL EVERY 5 MIN PRN
Qty: 25 TABLET | Refills: 3 | Status: SHIPPED | OUTPATIENT
Start: 2018-12-24

## 2018-12-24 RX ORDER — METOPROLOL SUCCINATE 100 MG/1
TABLET, EXTENDED RELEASE ORAL
Qty: 135 TABLET | Refills: 1 | Status: SHIPPED | OUTPATIENT
Start: 2018-12-24 | End: 2018-12-26 | Stop reason: SDUPTHER

## 2018-12-24 RX ORDER — AMIODARONE HYDROCHLORIDE 200 MG/1
TABLET ORAL
Qty: 90 TABLET | Refills: 3 | Status: SHIPPED | OUTPATIENT
Start: 2018-12-24 | End: 2019-11-08 | Stop reason: SDUPTHER

## 2018-12-26 RX ORDER — METOPROLOL SUCCINATE 100 MG/1
TABLET, EXTENDED RELEASE ORAL
Qty: 135 TABLET | Refills: 3 | Status: SHIPPED | OUTPATIENT
Start: 2018-12-26 | End: 2019-07-25 | Stop reason: ALTCHOICE

## 2019-01-17 ENCOUNTER — PROCEDURE VISIT (OUTPATIENT)
Dept: CARDIOLOGY CLINIC | Age: 69
End: 2019-01-17
Payer: MEDICARE

## 2019-01-17 DIAGNOSIS — Z95.810 BIVENTRICULAR IMPLANTABLE CARDIOVERTER-DEFIBRILLATOR IN SITU: Primary | ICD-10-CM

## 2019-01-17 PROCEDURE — 93295 DEV INTERROG REMOTE 1/2/MLT: CPT | Performed by: INTERNAL MEDICINE

## 2019-01-17 PROCEDURE — 93296 REM INTERROG EVL PM/IDS: CPT | Performed by: INTERNAL MEDICINE

## 2019-02-04 ENCOUNTER — APPOINTMENT (OUTPATIENT)
Dept: INTERVENTIONAL RADIOLOGY/VASCULAR | Age: 69
DRG: 291 | End: 2019-02-04
Payer: MEDICARE

## 2019-02-04 ENCOUNTER — APPOINTMENT (OUTPATIENT)
Dept: GENERAL RADIOLOGY | Age: 69
DRG: 291 | End: 2019-02-04
Payer: MEDICARE

## 2019-02-04 ENCOUNTER — HOSPITAL ENCOUNTER (INPATIENT)
Age: 69
LOS: 2 days | Discharge: HOME | DRG: 291 | End: 2019-02-06
Attending: INTERNAL MEDICINE | Admitting: INTERNAL MEDICINE
Payer: MEDICARE

## 2019-02-04 DIAGNOSIS — L29.9 PRURITUS: ICD-10-CM

## 2019-02-04 DIAGNOSIS — I50.23 ACUTE ON CHRONIC SYSTOLIC HEART FAILURE (HCC): Primary | ICD-10-CM

## 2019-02-04 PROBLEM — N18.30 CRF (CHRONIC RENAL FAILURE), STAGE 3 (MODERATE) (HCC): Status: ACTIVE | Noted: 2019-02-04

## 2019-02-04 PROBLEM — I50.9 CHF WITH CARDIOMYOPATHY (HCC): Status: ACTIVE | Noted: 2019-02-04

## 2019-02-04 PROBLEM — I42.9 CHF WITH CARDIOMYOPATHY (HCC): Status: ACTIVE | Noted: 2019-02-04

## 2019-02-04 LAB
ALBUMIN SERPL-MCNC: 4.2 G/DL (ref 3.5–5.1)
ALP BLD-CCNC: 112 U/L (ref 38–126)
ALT SERPL-CCNC: 13 U/L (ref 11–66)
AMPHETAMINE+METHAMPHETAMINE URINE SCREEN: NEGATIVE
ANION GAP SERPL CALCULATED.3IONS-SCNC: 14 MEQ/L (ref 8–16)
AST SERPL-CCNC: 20 U/L (ref 5–40)
BARBITURATE QUANTITATIVE URINE: NEGATIVE
BASOPHILS # BLD: 1 %
BASOPHILS ABSOLUTE: 0.1 THOU/MM3 (ref 0–0.1)
BENZODIAZEPINE QUANTITATIVE URINE: NEGATIVE
BILIRUB SERPL-MCNC: 0.5 MG/DL (ref 0.3–1.2)
BILIRUBIN DIRECT: < 0.2 MG/DL (ref 0–0.3)
BILIRUBIN URINE: NEGATIVE
BLOOD, URINE: NEGATIVE
BUN BLDV-MCNC: 9 MG/DL (ref 7–22)
C-REACTIVE PROTEIN: 0.54 MG/DL (ref 0–1)
CALCIUM SERPL-MCNC: 9.4 MG/DL (ref 8.5–10.5)
CANNABINOID QUANTITATIVE URINE: NEGATIVE
CHARACTER, URINE: CLEAR
CHLORIDE BLD-SCNC: 95 MEQ/L (ref 98–111)
CO2: 19 MEQ/L (ref 23–33)
COCAINE METABOLITE QUANTITATIVE URINE: NEGATIVE
COLOR: YELLOW
CREAT SERPL-MCNC: 1.4 MG/DL (ref 0.4–1.2)
EKG ATRIAL RATE: 60 BPM
EKG Q-T INTERVAL: 482 MS
EKG QRS DURATION: 166 MS
EKG QTC CALCULATION (BAZETT): 542 MS
EKG R AXIS: -52 DEGREES
EKG T AXIS: 36 DEGREES
EKG VENTRICULAR RATE: 76 BPM
EOSINOPHIL # BLD: 11 %
EOSINOPHILS ABSOLUTE: 0.7 THOU/MM3 (ref 0–0.4)
ERYTHROCYTE [DISTWIDTH] IN BLOOD BY AUTOMATED COUNT: 13 % (ref 11.5–14.5)
ERYTHROCYTE [DISTWIDTH] IN BLOOD BY AUTOMATED COUNT: 47.5 FL (ref 35–45)
ETHYL ALCOHOL, SERUM: < 0.01 %
GFR SERPL CREATININE-BSD FRML MDRD: 50 ML/MIN/1.73M2
GLUCOSE BLD-MCNC: 81 MG/DL (ref 70–108)
GLUCOSE URINE: NEGATIVE MG/DL
HCT VFR BLD CALC: 39.3 % (ref 42–52)
HEMOGLOBIN: 13.2 GM/DL (ref 14–18)
IMMATURE GRANS (ABS): 0.01 THOU/MM3 (ref 0–0.07)
IMMATURE GRANULOCYTES: 0.2 %
KETONES, URINE: NEGATIVE
LEUKOCYTE ESTERASE, URINE: NEGATIVE
LYMPHOCYTES # BLD: 21.7 %
LYMPHOCYTES ABSOLUTE: 1.3 THOU/MM3 (ref 1–4.8)
MAGNESIUM: 1.9 MG/DL (ref 1.6–2.4)
MCH RBC QN AUTO: 33.3 PG (ref 26–33)
MCHC RBC AUTO-ENTMCNC: 33.6 GM/DL (ref 32.2–35.5)
MCV RBC AUTO: 99.2 FL (ref 80–94)
MONOCYTES # BLD: 14.7 %
MONOCYTES ABSOLUTE: 0.9 THOU/MM3 (ref 0.4–1.3)
NITRITE, URINE: NEGATIVE
NUCLEATED RED BLOOD CELLS: 0 /100 WBC
OPIATES, URINE: NEGATIVE
OSMOLALITY CALCULATION: 254.8 MOSMOL/KG (ref 275–300)
OXYCODONE: NEGATIVE
PH UA: 6
PHENCYCLIDINE QUANTITATIVE URINE: NEGATIVE
PLATELET # BLD: 219 THOU/MM3 (ref 130–400)
PMV BLD AUTO: 10.5 FL (ref 9.4–12.4)
POTASSIUM SERPL-SCNC: 4.4 MEQ/L (ref 3.5–5.2)
PRO-BNP: 5412 PG/ML (ref 0–900)
PROTEIN UA: NEGATIVE
RBC # BLD: 3.96 MILL/MM3 (ref 4.7–6.1)
SEG NEUTROPHILS: 51.4 %
SEGMENTED NEUTROPHILS ABSOLUTE COUNT: 3.1 THOU/MM3 (ref 1.8–7.7)
SODIUM BLD-SCNC: 128 MEQ/L (ref 135–145)
SPECIFIC GRAVITY, URINE: 1.01 (ref 1–1.03)
T4 FREE: 1.78 NG/DL (ref 0.93–1.76)
TOTAL PROTEIN: 6.9 G/DL (ref 6.1–8)
TROPONIN T: < 0.01 NG/ML
TSH SERPL DL<=0.05 MIU/L-ACNC: 3.34 UIU/ML (ref 0.4–4.2)
UROBILINOGEN, URINE: 0.2 EU/DL
WBC # BLD: 6 THOU/MM3 (ref 4.8–10.8)

## 2019-02-04 PROCEDURE — 2580000003 HC RX 258: Performed by: INTERNAL MEDICINE

## 2019-02-04 PROCEDURE — 83880 ASSAY OF NATRIURETIC PEPTIDE: CPT

## 2019-02-04 PROCEDURE — 93970 EXTREMITY STUDY: CPT

## 2019-02-04 PROCEDURE — 99222 1ST HOSP IP/OBS MODERATE 55: CPT | Performed by: INTERNAL MEDICINE

## 2019-02-04 PROCEDURE — 2580000003 HC RX 258: Performed by: PHYSICIAN ASSISTANT

## 2019-02-04 PROCEDURE — 99285 EMERGENCY DEPT VISIT HI MDM: CPT

## 2019-02-04 PROCEDURE — 84443 ASSAY THYROID STIM HORMONE: CPT

## 2019-02-04 PROCEDURE — 96374 THER/PROPH/DIAG INJ IV PUSH: CPT

## 2019-02-04 PROCEDURE — 80307 DRUG TEST PRSMV CHEM ANLYZR: CPT

## 2019-02-04 PROCEDURE — 6370000000 HC RX 637 (ALT 250 FOR IP): Performed by: INTERNAL MEDICINE

## 2019-02-04 PROCEDURE — 80048 BASIC METABOLIC PNL TOTAL CA: CPT

## 2019-02-04 PROCEDURE — 81003 URINALYSIS AUTO W/O SCOPE: CPT

## 2019-02-04 PROCEDURE — G0480 DRUG TEST DEF 1-7 CLASSES: HCPCS

## 2019-02-04 PROCEDURE — 84484 ASSAY OF TROPONIN QUANT: CPT

## 2019-02-04 PROCEDURE — 6360000002 HC RX W HCPCS: Performed by: INTERNAL MEDICINE

## 2019-02-04 PROCEDURE — 6360000002 HC RX W HCPCS: Performed by: PHYSICIAN ASSISTANT

## 2019-02-04 PROCEDURE — 80076 HEPATIC FUNCTION PANEL: CPT

## 2019-02-04 PROCEDURE — 85025 COMPLETE CBC W/AUTO DIFF WBC: CPT

## 2019-02-04 PROCEDURE — 36415 COLL VENOUS BLD VENIPUNCTURE: CPT

## 2019-02-04 PROCEDURE — 93005 ELECTROCARDIOGRAM TRACING: CPT | Performed by: EMERGENCY MEDICINE

## 2019-02-04 PROCEDURE — 84439 ASSAY OF FREE THYROXINE: CPT

## 2019-02-04 PROCEDURE — 83735 ASSAY OF MAGNESIUM: CPT

## 2019-02-04 PROCEDURE — 71046 X-RAY EXAM CHEST 2 VIEWS: CPT

## 2019-02-04 PROCEDURE — 1200000003 HC TELEMETRY R&B

## 2019-02-04 PROCEDURE — 93010 ELECTROCARDIOGRAM REPORT: CPT | Performed by: INTERNAL MEDICINE

## 2019-02-04 PROCEDURE — 86140 C-REACTIVE PROTEIN: CPT

## 2019-02-04 RX ORDER — FOLIC ACID 1 MG/1
1 TABLET ORAL DAILY
Status: DISCONTINUED | OUTPATIENT
Start: 2019-02-05 | End: 2019-02-06 | Stop reason: HOSPADM

## 2019-02-04 RX ORDER — LORAZEPAM 1 MG/1
1 TABLET ORAL
Status: DISCONTINUED | OUTPATIENT
Start: 2019-02-04 | End: 2019-02-06 | Stop reason: HOSPADM

## 2019-02-04 RX ORDER — LOSARTAN POTASSIUM 100 MG/1
100 TABLET ORAL NIGHTLY
Status: DISCONTINUED | OUTPATIENT
Start: 2019-02-04 | End: 2019-02-06 | Stop reason: HOSPADM

## 2019-02-04 RX ORDER — LORAZEPAM 2 MG/ML
3 INJECTION INTRAMUSCULAR
Status: DISCONTINUED | OUTPATIENT
Start: 2019-02-04 | End: 2019-02-06 | Stop reason: HOSPADM

## 2019-02-04 RX ORDER — FUROSEMIDE 10 MG/ML
40 INJECTION INTRAMUSCULAR; INTRAVENOUS 2 TIMES DAILY
Status: DISCONTINUED | OUTPATIENT
Start: 2019-02-04 | End: 2019-02-06

## 2019-02-04 RX ORDER — MULTIVITAMIN WITH FOLIC ACID 400 MCG
1 TABLET ORAL DAILY
Status: DISCONTINUED | OUTPATIENT
Start: 2019-02-04 | End: 2019-02-06 | Stop reason: HOSPADM

## 2019-02-04 RX ORDER — METOPROLOL SUCCINATE 100 MG/1
100 TABLET, EXTENDED RELEASE ORAL DAILY
Status: DISCONTINUED | OUTPATIENT
Start: 2019-02-05 | End: 2019-02-06 | Stop reason: HOSPADM

## 2019-02-04 RX ORDER — DIPHENHYDRAMINE HCL 25 MG
25 TABLET ORAL DAILY PRN
Status: DISCONTINUED | OUTPATIENT
Start: 2019-02-04 | End: 2019-02-06 | Stop reason: HOSPADM

## 2019-02-04 RX ORDER — SODIUM CHLORIDE 0.9 % (FLUSH) 0.9 %
10 SYRINGE (ML) INJECTION PRN
Status: DISCONTINUED | OUTPATIENT
Start: 2019-02-04 | End: 2019-02-04 | Stop reason: SDUPTHER

## 2019-02-04 RX ORDER — LORAZEPAM 2 MG/ML
1 INJECTION INTRAMUSCULAR
Status: DISCONTINUED | OUTPATIENT
Start: 2019-02-04 | End: 2019-02-06 | Stop reason: HOSPADM

## 2019-02-04 RX ORDER — PANTOPRAZOLE SODIUM 40 MG/1
40 TABLET, DELAYED RELEASE ORAL DAILY
Status: DISCONTINUED | OUTPATIENT
Start: 2019-02-05 | End: 2019-02-06 | Stop reason: HOSPADM

## 2019-02-04 RX ORDER — ACETAMINOPHEN 325 MG/1
650 TABLET ORAL EVERY 4 HOURS PRN
Status: DISCONTINUED | OUTPATIENT
Start: 2019-02-04 | End: 2019-02-06 | Stop reason: HOSPADM

## 2019-02-04 RX ORDER — SODIUM CHLORIDE 0.9 % (FLUSH) 0.9 %
10 SYRINGE (ML) INJECTION EVERY 12 HOURS SCHEDULED
Status: DISCONTINUED | OUTPATIENT
Start: 2019-02-04 | End: 2019-02-04 | Stop reason: SDUPTHER

## 2019-02-04 RX ORDER — SODIUM CHLORIDE 0.9 % (FLUSH) 0.9 %
10 SYRINGE (ML) INJECTION PRN
Status: DISCONTINUED | OUTPATIENT
Start: 2019-02-04 | End: 2019-02-06 | Stop reason: HOSPADM

## 2019-02-04 RX ORDER — LORAZEPAM 2 MG/ML
4 INJECTION INTRAMUSCULAR
Status: DISCONTINUED | OUTPATIENT
Start: 2019-02-04 | End: 2019-02-06 | Stop reason: HOSPADM

## 2019-02-04 RX ORDER — LORAZEPAM 2 MG/ML
2 INJECTION INTRAMUSCULAR
Status: DISCONTINUED | OUTPATIENT
Start: 2019-02-04 | End: 2019-02-06 | Stop reason: HOSPADM

## 2019-02-04 RX ORDER — IRBESARTAN 300 MG/1
300 TABLET ORAL NIGHTLY
Status: DISCONTINUED | OUTPATIENT
Start: 2019-02-04 | End: 2019-02-04 | Stop reason: CLARIF

## 2019-02-04 RX ORDER — ONDANSETRON 2 MG/ML
4 INJECTION INTRAMUSCULAR; INTRAVENOUS EVERY 6 HOURS PRN
Status: DISCONTINUED | OUTPATIENT
Start: 2019-02-04 | End: 2019-02-06 | Stop reason: HOSPADM

## 2019-02-04 RX ORDER — CLOPIDOGREL BISULFATE 75 MG/1
75 TABLET ORAL DAILY
Status: DISCONTINUED | OUTPATIENT
Start: 2019-02-05 | End: 2019-02-06 | Stop reason: HOSPADM

## 2019-02-04 RX ORDER — METOPROLOL SUCCINATE 50 MG/1
50 TABLET, EXTENDED RELEASE ORAL NIGHTLY
Status: DISCONTINUED | OUTPATIENT
Start: 2019-02-04 | End: 2019-02-06 | Stop reason: HOSPADM

## 2019-02-04 RX ORDER — LORAZEPAM 1 MG/1
2 TABLET ORAL
Status: DISCONTINUED | OUTPATIENT
Start: 2019-02-04 | End: 2019-02-06 | Stop reason: HOSPADM

## 2019-02-04 RX ORDER — ATORVASTATIN CALCIUM 80 MG/1
80 TABLET, FILM COATED ORAL NIGHTLY
Status: DISCONTINUED | OUTPATIENT
Start: 2019-02-04 | End: 2019-02-06 | Stop reason: HOSPADM

## 2019-02-04 RX ORDER — LORAZEPAM 1 MG/1
3 TABLET ORAL
Status: DISCONTINUED | OUTPATIENT
Start: 2019-02-04 | End: 2019-02-06 | Stop reason: HOSPADM

## 2019-02-04 RX ORDER — SODIUM CHLORIDE 9 MG/ML
INJECTION, SOLUTION INTRAVENOUS CONTINUOUS
Status: DISCONTINUED | OUTPATIENT
Start: 2019-02-04 | End: 2019-02-04

## 2019-02-04 RX ORDER — ASPIRIN 81 MG/1
81 TABLET ORAL DAILY
Status: DISCONTINUED | OUTPATIENT
Start: 2019-02-05 | End: 2019-02-06 | Stop reason: HOSPADM

## 2019-02-04 RX ORDER — DIPHENHYDRAMINE HCL 25 MG
25 CAPSULE ORAL DAILY PRN
COMMUNITY
End: 2020-07-21

## 2019-02-04 RX ORDER — HYDROXYZINE HYDROCHLORIDE 10 MG/1
10 TABLET, FILM COATED ORAL EVERY 8 HOURS PRN
Status: DISCONTINUED | OUTPATIENT
Start: 2019-02-04 | End: 2019-02-06 | Stop reason: HOSPADM

## 2019-02-04 RX ORDER — FUROSEMIDE 10 MG/ML
20 INJECTION INTRAMUSCULAR; INTRAVENOUS ONCE
Status: COMPLETED | OUTPATIENT
Start: 2019-02-04 | End: 2019-02-04

## 2019-02-04 RX ORDER — LORAZEPAM 1 MG/1
4 TABLET ORAL
Status: DISCONTINUED | OUTPATIENT
Start: 2019-02-04 | End: 2019-02-06 | Stop reason: HOSPADM

## 2019-02-04 RX ORDER — SODIUM CHLORIDE 0.9 % (FLUSH) 0.9 %
10 SYRINGE (ML) INJECTION EVERY 12 HOURS SCHEDULED
Status: DISCONTINUED | OUTPATIENT
Start: 2019-02-04 | End: 2019-02-06 | Stop reason: HOSPADM

## 2019-02-04 RX ORDER — THIAMINE MONONITRATE (VIT B1) 100 MG
100 TABLET ORAL DAILY
Status: DISCONTINUED | OUTPATIENT
Start: 2019-02-04 | End: 2019-02-06 | Stop reason: HOSPADM

## 2019-02-04 RX ORDER — AMIODARONE HYDROCHLORIDE 200 MG/1
200 TABLET ORAL DAILY
Status: DISCONTINUED | OUTPATIENT
Start: 2019-02-05 | End: 2019-02-06 | Stop reason: HOSPADM

## 2019-02-04 RX ADMIN — ACETAMINOPHEN 650 MG: 325 TABLET ORAL at 22:15

## 2019-02-04 RX ADMIN — ENOXAPARIN SODIUM 40 MG: 40 INJECTION SUBCUTANEOUS at 17:27

## 2019-02-04 RX ADMIN — ATORVASTATIN CALCIUM 80 MG: 80 TABLET, FILM COATED ORAL at 20:46

## 2019-02-04 RX ADMIN — DIPHENHYDRAMINE HCL 25 MG: 25 TABLET ORAL at 22:16

## 2019-02-04 RX ADMIN — FUROSEMIDE 40 MG: 10 INJECTION, SOLUTION INTRAMUSCULAR; INTRAVENOUS at 17:27

## 2019-02-04 RX ADMIN — THERA TABS 1 TABLET: TAB at 17:27

## 2019-02-04 RX ADMIN — HYDROXYZINE HYDROCHLORIDE 10 MG: 10 TABLET ORAL at 20:46

## 2019-02-04 RX ADMIN — METOPROLOL SUCCINATE 50 MG: 50 TABLET, EXTENDED RELEASE ORAL at 20:46

## 2019-02-04 RX ADMIN — Medication 100 MG: at 17:27

## 2019-02-04 RX ADMIN — Medication 10 ML: at 20:46

## 2019-02-04 RX ADMIN — SODIUM CHLORIDE: 9 INJECTION, SOLUTION INTRAVENOUS at 16:00

## 2019-02-04 RX ADMIN — LORAZEPAM 1 MG: 1 TABLET ORAL at 22:16

## 2019-02-04 RX ADMIN — LOSARTAN POTASSIUM 100 MG: 100 TABLET, FILM COATED ORAL at 20:46

## 2019-02-04 RX ADMIN — FUROSEMIDE 20 MG: 10 INJECTION, SOLUTION INTRAMUSCULAR; INTRAVENOUS at 16:00

## 2019-02-04 ASSESSMENT — ENCOUNTER SYMPTOMS
EYE REDNESS: 0
EYE DISCHARGE: 0
NAUSEA: 0
SHORTNESS OF BREATH: 0
VOICE CHANGE: 0
SORE THROAT: 0
COUGH: 1
STRIDOR: 0
VOMITING: 0
BACK PAIN: 0
ABDOMINAL DISTENTION: 0
TROUBLE SWALLOWING: 0
WHEEZING: 0
ABDOMINAL PAIN: 0
DIARRHEA: 0
RHINORRHEA: 0
COLOR CHANGE: 1

## 2019-02-04 ASSESSMENT — PAIN DESCRIPTION - PAIN TYPE: TYPE: ACUTE PAIN

## 2019-02-04 ASSESSMENT — PAIN SCALES - GENERAL
PAINLEVEL_OUTOF10: 3
PAINLEVEL_OUTOF10: 10
PAINLEVEL_OUTOF10: 0

## 2019-02-04 ASSESSMENT — PAIN DESCRIPTION - LOCATION: LOCATION: LEG

## 2019-02-04 ASSESSMENT — PAIN DESCRIPTION - ORIENTATION: ORIENTATION: LEFT;RIGHT;LOWER

## 2019-02-05 ENCOUNTER — APPOINTMENT (OUTPATIENT)
Dept: INTERVENTIONAL RADIOLOGY/VASCULAR | Age: 69
DRG: 291 | End: 2019-02-05
Payer: MEDICARE

## 2019-02-05 PROBLEM — E43 SEVERE MALNUTRITION (HCC): Chronic | Status: ACTIVE | Noted: 2019-02-05

## 2019-02-05 LAB
ANION GAP SERPL CALCULATED.3IONS-SCNC: 12 MEQ/L (ref 8–16)
BASOPHILS # BLD: 1.2 %
BASOPHILS ABSOLUTE: 0.1 THOU/MM3 (ref 0–0.1)
BUN BLDV-MCNC: 10 MG/DL (ref 7–22)
CALCIUM SERPL-MCNC: 8.9 MG/DL (ref 8.5–10.5)
CHLORIDE BLD-SCNC: 93 MEQ/L (ref 98–111)
CHOLESTEROL, TOTAL: 87 MG/DL (ref 100–199)
CO2: 21 MEQ/L (ref 23–33)
CREAT SERPL-MCNC: 1.4 MG/DL (ref 0.4–1.2)
EOSINOPHIL # BLD: 14.8 %
EOSINOPHILS ABSOLUTE: 0.7 THOU/MM3 (ref 0–0.4)
ERYTHROCYTE [DISTWIDTH] IN BLOOD BY AUTOMATED COUNT: 13.1 % (ref 11.5–14.5)
ERYTHROCYTE [DISTWIDTH] IN BLOOD BY AUTOMATED COUNT: 46.9 FL (ref 35–45)
GFR SERPL CREATININE-BSD FRML MDRD: 50 ML/MIN/1.73M2
GLUCOSE BLD-MCNC: 96 MG/DL (ref 70–108)
HCT VFR BLD CALC: 34.9 % (ref 42–52)
HDLC SERPL-MCNC: 40 MG/DL
HEMOGLOBIN: 11.7 GM/DL (ref 14–18)
IMMATURE GRANS (ABS): 0.01 THOU/MM3 (ref 0–0.07)
IMMATURE GRANULOCYTES: 0.2 %
LDL CHOLESTEROL CALCULATED: 31 MG/DL
LV EF: 53 %
LVEF MODALITY: NORMAL
LYMPHOCYTES # BLD: 26.7 %
LYMPHOCYTES ABSOLUTE: 1.3 THOU/MM3 (ref 1–4.8)
MAGNESIUM: 1.8 MG/DL (ref 1.6–2.4)
MCH RBC QN AUTO: 32.9 PG (ref 26–33)
MCHC RBC AUTO-ENTMCNC: 33.5 GM/DL (ref 32.2–35.5)
MCV RBC AUTO: 98 FL (ref 80–94)
MONOCYTES # BLD: 16.2 %
MONOCYTES ABSOLUTE: 0.8 THOU/MM3 (ref 0.4–1.3)
NUCLEATED RED BLOOD CELLS: 0 /100 WBC
PLATELET # BLD: 208 THOU/MM3 (ref 130–400)
PMV BLD AUTO: 10.6 FL (ref 9.4–12.4)
POTASSIUM SERPL-SCNC: 4.3 MEQ/L (ref 3.5–5.2)
RBC # BLD: 3.56 MILL/MM3 (ref 4.7–6.1)
REASON FOR REJECTION: NORMAL
REJECTED TEST: NORMAL
SEG NEUTROPHILS: 40.9 %
SEGMENTED NEUTROPHILS ABSOLUTE COUNT: 2 THOU/MM3 (ref 1.8–7.7)
SODIUM BLD-SCNC: 126 MEQ/L (ref 135–145)
TRIGL SERPL-MCNC: 79 MG/DL (ref 0–199)
WBC # BLD: 4.9 THOU/MM3 (ref 4.8–10.8)

## 2019-02-05 PROCEDURE — 85025 COMPLETE CBC W/AUTO DIFF WBC: CPT

## 2019-02-05 PROCEDURE — 1200000003 HC TELEMETRY R&B

## 2019-02-05 PROCEDURE — 6370000000 HC RX 637 (ALT 250 FOR IP): Performed by: INTERNAL MEDICINE

## 2019-02-05 PROCEDURE — 80048 BASIC METABOLIC PNL TOTAL CA: CPT

## 2019-02-05 PROCEDURE — 2580000003 HC RX 258: Performed by: INTERNAL MEDICINE

## 2019-02-05 PROCEDURE — 80061 LIPID PANEL: CPT

## 2019-02-05 PROCEDURE — 36415 COLL VENOUS BLD VENIPUNCTURE: CPT

## 2019-02-05 PROCEDURE — 99232 SBSQ HOSP IP/OBS MODERATE 35: CPT | Performed by: INTERNAL MEDICINE

## 2019-02-05 PROCEDURE — 83735 ASSAY OF MAGNESIUM: CPT

## 2019-02-05 PROCEDURE — 99223 1ST HOSP IP/OBS HIGH 75: CPT | Performed by: INTERNAL MEDICINE

## 2019-02-05 PROCEDURE — 6360000002 HC RX W HCPCS: Performed by: INTERNAL MEDICINE

## 2019-02-05 PROCEDURE — 93306 TTE W/DOPPLER COMPLETE: CPT

## 2019-02-05 PROCEDURE — 93925 LOWER EXTREMITY STUDY: CPT

## 2019-02-05 RX ADMIN — FOLIC ACID 1 MG: 1 TABLET ORAL at 07:56

## 2019-02-05 RX ADMIN — LOSARTAN POTASSIUM 100 MG: 100 TABLET, FILM COATED ORAL at 21:51

## 2019-02-05 RX ADMIN — DIPHENHYDRAMINE HCL 25 MG: 25 TABLET ORAL at 10:30

## 2019-02-05 RX ADMIN — FUROSEMIDE 40 MG: 10 INJECTION, SOLUTION INTRAMUSCULAR; INTRAVENOUS at 19:13

## 2019-02-05 RX ADMIN — Medication 100 MG: at 07:56

## 2019-02-05 RX ADMIN — AMIODARONE HYDROCHLORIDE 200 MG: 200 TABLET ORAL at 07:56

## 2019-02-05 RX ADMIN — ENOXAPARIN SODIUM 40 MG: 40 INJECTION SUBCUTANEOUS at 19:12

## 2019-02-05 RX ADMIN — METOPROLOL SUCCINATE 50 MG: 50 TABLET, EXTENDED RELEASE ORAL at 21:51

## 2019-02-05 RX ADMIN — THERA TABS 1 TABLET: TAB at 07:56

## 2019-02-05 RX ADMIN — METOPROLOL SUCCINATE 100 MG: 100 TABLET, FILM COATED, EXTENDED RELEASE ORAL at 07:56

## 2019-02-05 RX ADMIN — CLOPIDOGREL 75 MG: 75 TABLET, FILM COATED ORAL at 07:55

## 2019-02-05 RX ADMIN — Medication 10 ML: at 19:16

## 2019-02-05 RX ADMIN — Medication 10 ML: at 07:55

## 2019-02-05 RX ADMIN — HYDROXYZINE HYDROCHLORIDE 10 MG: 10 TABLET ORAL at 07:55

## 2019-02-05 RX ADMIN — ASPIRIN 81 MG: 81 TABLET, COATED ORAL at 07:55

## 2019-02-05 RX ADMIN — FUROSEMIDE 40 MG: 10 INJECTION, SOLUTION INTRAMUSCULAR; INTRAVENOUS at 07:55

## 2019-02-05 RX ADMIN — ATORVASTATIN CALCIUM 80 MG: 80 TABLET, FILM COATED ORAL at 21:49

## 2019-02-05 RX ADMIN — PANTOPRAZOLE SODIUM 40 MG: 40 TABLET, DELAYED RELEASE ORAL at 07:55

## 2019-02-05 ASSESSMENT — PAIN SCALES - GENERAL
PAINLEVEL_OUTOF10: 0

## 2019-02-06 ENCOUNTER — CARE COORDINATION (OUTPATIENT)
Dept: CASE MANAGEMENT | Age: 69
End: 2019-02-06

## 2019-02-06 VITALS
TEMPERATURE: 98.3 F | SYSTOLIC BLOOD PRESSURE: 96 MMHG | RESPIRATION RATE: 17 BRPM | WEIGHT: 136.9 LBS | DIASTOLIC BLOOD PRESSURE: 58 MMHG | BODY MASS INDEX: 20.28 KG/M2 | HEART RATE: 64 BPM | HEIGHT: 69 IN | OXYGEN SATURATION: 93 %

## 2019-02-06 LAB
ANION GAP SERPL CALCULATED.3IONS-SCNC: 14 MEQ/L (ref 8–16)
BASOPHILS # BLD: 1.1 %
BASOPHILS ABSOLUTE: 0.1 THOU/MM3 (ref 0–0.1)
BUN BLDV-MCNC: 15 MG/DL (ref 7–22)
CALCIUM SERPL-MCNC: 8.9 MG/DL (ref 8.5–10.5)
CHLORIDE BLD-SCNC: 89 MEQ/L (ref 98–111)
CO2: 23 MEQ/L (ref 23–33)
CREAT SERPL-MCNC: 1.7 MG/DL (ref 0.4–1.2)
EOSINOPHIL # BLD: 11.9 %
EOSINOPHILS ABSOLUTE: 0.6 THOU/MM3 (ref 0–0.4)
ERYTHROCYTE [DISTWIDTH] IN BLOOD BY AUTOMATED COUNT: 12.9 % (ref 11.5–14.5)
ERYTHROCYTE [DISTWIDTH] IN BLOOD BY AUTOMATED COUNT: 46 FL (ref 35–45)
GFR SERPL CREATININE-BSD FRML MDRD: 40 ML/MIN/1.73M2
GLUCOSE BLD-MCNC: 98 MG/DL (ref 70–108)
HCT VFR BLD CALC: 34.3 % (ref 42–52)
HEMOGLOBIN: 11.6 GM/DL (ref 14–18)
IMMATURE GRANS (ABS): 0.01 THOU/MM3 (ref 0–0.07)
IMMATURE GRANULOCYTES: 0.2 %
LYMPHOCYTES # BLD: 21.7 %
LYMPHOCYTES ABSOLUTE: 1 THOU/MM3 (ref 1–4.8)
MAGNESIUM: 1.7 MG/DL (ref 1.6–2.4)
MCH RBC QN AUTO: 33.1 PG (ref 26–33)
MCHC RBC AUTO-ENTMCNC: 33.8 GM/DL (ref 32.2–35.5)
MCV RBC AUTO: 98 FL (ref 80–94)
MONOCYTES # BLD: 13.4 %
MONOCYTES ABSOLUTE: 0.6 THOU/MM3 (ref 0.4–1.3)
NUCLEATED RED BLOOD CELLS: 0 /100 WBC
PLATELET # BLD: 203 THOU/MM3 (ref 130–400)
PMV BLD AUTO: 9.9 FL (ref 9.4–12.4)
POTASSIUM SERPL-SCNC: 4.2 MEQ/L (ref 3.5–5.2)
RBC # BLD: 3.5 MILL/MM3 (ref 4.7–6.1)
SEG NEUTROPHILS: 51.7 %
SEGMENTED NEUTROPHILS ABSOLUTE COUNT: 2.4 THOU/MM3 (ref 1.8–7.7)
SODIUM BLD-SCNC: 126 MEQ/L (ref 135–145)
WBC # BLD: 4.7 THOU/MM3 (ref 4.8–10.8)

## 2019-02-06 PROCEDURE — 99239 HOSP IP/OBS DSCHRG MGMT >30: CPT | Performed by: INTERNAL MEDICINE

## 2019-02-06 PROCEDURE — 85025 COMPLETE CBC W/AUTO DIFF WBC: CPT

## 2019-02-06 PROCEDURE — 80048 BASIC METABOLIC PNL TOTAL CA: CPT

## 2019-02-06 PROCEDURE — 6370000000 HC RX 637 (ALT 250 FOR IP): Performed by: INTERNAL MEDICINE

## 2019-02-06 PROCEDURE — 83735 ASSAY OF MAGNESIUM: CPT

## 2019-02-06 PROCEDURE — 36415 COLL VENOUS BLD VENIPUNCTURE: CPT

## 2019-02-06 RX ORDER — HYDROXYZINE HYDROCHLORIDE 10 MG/1
10 TABLET, FILM COATED ORAL EVERY 8 HOURS PRN
Qty: 40 TABLET | Refills: 0 | Status: SHIPPED | OUTPATIENT
Start: 2019-02-06 | End: 2019-02-16

## 2019-02-06 RX ADMIN — FOLIC ACID 1 MG: 1 TABLET ORAL at 08:45

## 2019-02-06 RX ADMIN — THERA TABS 1 TABLET: TAB at 08:45

## 2019-02-06 RX ADMIN — ASPIRIN 81 MG: 81 TABLET, COATED ORAL at 08:45

## 2019-02-06 RX ADMIN — CLOPIDOGREL 75 MG: 75 TABLET, FILM COATED ORAL at 08:45

## 2019-02-06 RX ADMIN — AMIODARONE HYDROCHLORIDE 200 MG: 200 TABLET ORAL at 08:45

## 2019-02-06 RX ADMIN — PANTOPRAZOLE SODIUM 40 MG: 40 TABLET, DELAYED RELEASE ORAL at 08:45

## 2019-02-06 RX ADMIN — METOPROLOL SUCCINATE 100 MG: 100 TABLET, FILM COATED, EXTENDED RELEASE ORAL at 08:46

## 2019-02-06 RX ADMIN — Medication 100 MG: at 08:45

## 2019-02-15 ENCOUNTER — TELEPHONE (OUTPATIENT)
Dept: CARDIOLOGY CLINIC | Age: 69
End: 2019-02-15

## 2019-02-21 ENCOUNTER — CARE COORDINATION (OUTPATIENT)
Dept: CASE MANAGEMENT | Age: 69
End: 2019-02-21

## 2019-02-25 ENCOUNTER — TELEPHONE (OUTPATIENT)
Dept: CARDIOLOGY CLINIC | Age: 69
End: 2019-02-25

## 2019-02-26 ENCOUNTER — OFFICE VISIT (OUTPATIENT)
Dept: CARDIOLOGY CLINIC | Age: 69
End: 2019-02-26
Payer: MEDICARE

## 2019-02-26 VITALS
WEIGHT: 153.22 LBS | BODY MASS INDEX: 22.69 KG/M2 | DIASTOLIC BLOOD PRESSURE: 80 MMHG | HEART RATE: 76 BPM | SYSTOLIC BLOOD PRESSURE: 144 MMHG | HEIGHT: 69 IN

## 2019-02-26 DIAGNOSIS — I10 ESSENTIAL HYPERTENSION: Primary | ICD-10-CM

## 2019-02-26 DIAGNOSIS — I42.0 DILATED CARDIOMYOPATHY (HCC): ICD-10-CM

## 2019-02-26 DIAGNOSIS — R60.0 LEG EDEMA: ICD-10-CM

## 2019-02-26 DIAGNOSIS — I25.10 CORONARY ARTERY DISEASE INVOLVING NATIVE CORONARY ARTERY OF NATIVE HEART WITHOUT ANGINA PECTORIS: ICD-10-CM

## 2019-02-26 PROCEDURE — 99214 OFFICE O/P EST MOD 30 MIN: CPT | Performed by: NUCLEAR MEDICINE

## 2019-02-26 PROCEDURE — 1101F PT FALLS ASSESS-DOCD LE1/YR: CPT | Performed by: NUCLEAR MEDICINE

## 2019-02-26 PROCEDURE — 1111F DSCHRG MED/CURRENT MED MERGE: CPT | Performed by: NUCLEAR MEDICINE

## 2019-02-26 PROCEDURE — 3017F COLORECTAL CA SCREEN DOC REV: CPT | Performed by: NUCLEAR MEDICINE

## 2019-02-26 PROCEDURE — G8598 ASA/ANTIPLAT THER USED: HCPCS | Performed by: NUCLEAR MEDICINE

## 2019-02-26 PROCEDURE — G8420 CALC BMI NORM PARAMETERS: HCPCS | Performed by: NUCLEAR MEDICINE

## 2019-02-26 PROCEDURE — G8484 FLU IMMUNIZE NO ADMIN: HCPCS | Performed by: NUCLEAR MEDICINE

## 2019-02-26 PROCEDURE — 1036F TOBACCO NON-USER: CPT | Performed by: NUCLEAR MEDICINE

## 2019-02-26 PROCEDURE — 1123F ACP DISCUSS/DSCN MKR DOCD: CPT | Performed by: NUCLEAR MEDICINE

## 2019-02-26 PROCEDURE — G8427 DOCREV CUR MEDS BY ELIG CLIN: HCPCS | Performed by: NUCLEAR MEDICINE

## 2019-02-26 PROCEDURE — 4040F PNEUMOC VAC/ADMIN/RCVD: CPT | Performed by: NUCLEAR MEDICINE

## 2019-02-26 RX ORDER — GABAPENTIN 100 MG/1
100 CAPSULE ORAL 3 TIMES DAILY
COMMUNITY
End: 2019-02-26 | Stop reason: SDUPTHER

## 2019-02-26 RX ORDER — CETIRIZINE HYDROCHLORIDE 10 MG/1
20 TABLET ORAL 2 TIMES DAILY
COMMUNITY
End: 2019-04-30 | Stop reason: ALTCHOICE

## 2019-02-26 RX ORDER — BUMETANIDE 1 MG/1
1 TABLET ORAL DAILY
Qty: 30 TABLET | Refills: 3 | Status: SHIPPED | OUTPATIENT
Start: 2019-02-26 | End: 2019-03-11 | Stop reason: SDUPTHER

## 2019-02-26 RX ORDER — GABAPENTIN 100 MG/1
100 CAPSULE ORAL 3 TIMES DAILY
Qty: 90 CAPSULE | Refills: 3 | Status: SHIPPED | OUTPATIENT
Start: 2019-02-26 | End: 2019-03-11 | Stop reason: SDUPTHER

## 2019-02-26 RX ORDER — BUMETANIDE 1 MG/1
1 TABLET ORAL DAILY
COMMUNITY
End: 2019-02-26 | Stop reason: SDUPTHER

## 2019-03-04 ENCOUNTER — TELEPHONE (OUTPATIENT)
Dept: CARDIOLOGY CLINIC | Age: 69
End: 2019-03-04

## 2019-03-04 ENCOUNTER — OFFICE VISIT (OUTPATIENT)
Dept: CARDIOLOGY CLINIC | Age: 69
End: 2019-03-04
Payer: MEDICARE

## 2019-03-04 VITALS
BODY MASS INDEX: 21 KG/M2 | HEIGHT: 69 IN | DIASTOLIC BLOOD PRESSURE: 73 MMHG | SYSTOLIC BLOOD PRESSURE: 109 MMHG | OXYGEN SATURATION: 97 % | HEART RATE: 71 BPM | WEIGHT: 141.8 LBS

## 2019-03-04 DIAGNOSIS — I50.32 CHF (CONGESTIVE HEART FAILURE), NYHA CLASS II, CHRONIC, DIASTOLIC (HCC): Primary | ICD-10-CM

## 2019-03-04 LAB
ANION GAP SERPL CALCULATED.3IONS-SCNC: 16 MEQ/L (ref 8–16)
BUN BLDV-MCNC: 16 MG/DL (ref 7–22)
CALCIUM SERPL-MCNC: 9.4 MG/DL (ref 8.5–10.5)
CHLORIDE BLD-SCNC: 89 MEQ/L (ref 98–111)
CO2: 26 MEQ/L (ref 23–33)
CREAT SERPL-MCNC: 1.5 MG/DL (ref 0.4–1.2)
GFR SERPL CREATININE-BSD FRML MDRD: 46 ML/MIN/1.73M2
GLUCOSE BLD-MCNC: 88 MG/DL (ref 70–108)
POTASSIUM SERPL-SCNC: 3.6 MEQ/L (ref 3.5–5.2)
PRO-BNP: 2316 PG/ML (ref 0–900)
SODIUM BLD-SCNC: 131 MEQ/L (ref 135–145)

## 2019-03-04 PROCEDURE — 1111F DSCHRG MED/CURRENT MED MERGE: CPT | Performed by: NURSE PRACTITIONER

## 2019-03-04 PROCEDURE — 4040F PNEUMOC VAC/ADMIN/RCVD: CPT | Performed by: NURSE PRACTITIONER

## 2019-03-04 PROCEDURE — 1123F ACP DISCUSS/DSCN MKR DOCD: CPT | Performed by: NURSE PRACTITIONER

## 2019-03-04 PROCEDURE — G8484 FLU IMMUNIZE NO ADMIN: HCPCS | Performed by: NURSE PRACTITIONER

## 2019-03-04 PROCEDURE — 3017F COLORECTAL CA SCREEN DOC REV: CPT | Performed by: NURSE PRACTITIONER

## 2019-03-04 PROCEDURE — 36415 COLL VENOUS BLD VENIPUNCTURE: CPT | Performed by: NURSE PRACTITIONER

## 2019-03-04 PROCEDURE — 99213 OFFICE O/P EST LOW 20 MIN: CPT | Performed by: NURSE PRACTITIONER

## 2019-03-04 PROCEDURE — G8598 ASA/ANTIPLAT THER USED: HCPCS | Performed by: NURSE PRACTITIONER

## 2019-03-04 PROCEDURE — G8427 DOCREV CUR MEDS BY ELIG CLIN: HCPCS | Performed by: NURSE PRACTITIONER

## 2019-03-04 PROCEDURE — G8420 CALC BMI NORM PARAMETERS: HCPCS | Performed by: NURSE PRACTITIONER

## 2019-03-04 PROCEDURE — 1101F PT FALLS ASSESS-DOCD LE1/YR: CPT | Performed by: NURSE PRACTITIONER

## 2019-03-04 PROCEDURE — 1036F TOBACCO NON-USER: CPT | Performed by: NURSE PRACTITIONER

## 2019-03-04 ASSESSMENT — ENCOUNTER SYMPTOMS
SHORTNESS OF BREATH: 0
CHEST TIGHTNESS: 0
APNEA: 0
ABDOMINAL DISTENTION: 0
COLOR CHANGE: 0
NAUSEA: 0
WHEEZING: 0
ABDOMINAL PAIN: 0
COUGH: 0

## 2019-03-08 ENCOUNTER — CARE COORDINATION (OUTPATIENT)
Dept: CASE MANAGEMENT | Age: 69
End: 2019-03-08

## 2019-03-11 RX ORDER — BUMETANIDE 1 MG/1
1 TABLET ORAL DAILY
Qty: 90 TABLET | Refills: 3 | Status: SHIPPED | OUTPATIENT
Start: 2019-03-11 | End: 2019-03-19 | Stop reason: SDUPTHER

## 2019-03-11 RX ORDER — GABAPENTIN 100 MG/1
100 CAPSULE ORAL 3 TIMES DAILY
Qty: 90 CAPSULE | Refills: 3 | Status: SHIPPED | OUTPATIENT
Start: 2019-03-11 | End: 2019-03-11 | Stop reason: SDUPTHER

## 2019-03-11 RX ORDER — GABAPENTIN 100 MG/1
100 CAPSULE ORAL 3 TIMES DAILY
Qty: 270 CAPSULE | Refills: 3 | Status: SHIPPED | OUTPATIENT
Start: 2019-03-11 | End: 2019-03-28 | Stop reason: SDUPTHER

## 2019-03-11 RX ORDER — BUMETANIDE 1 MG/1
1 TABLET ORAL DAILY
Qty: 30 TABLET | Refills: 3 | Status: SHIPPED | OUTPATIENT
Start: 2019-03-11 | End: 2019-03-11 | Stop reason: SDUPTHER

## 2019-03-13 ENCOUNTER — TELEPHONE (OUTPATIENT)
Dept: CARDIOLOGY CLINIC | Age: 69
End: 2019-03-13

## 2019-03-13 DIAGNOSIS — I50.32 CHF (CONGESTIVE HEART FAILURE), NYHA CLASS II, CHRONIC, DIASTOLIC (HCC): Primary | ICD-10-CM

## 2019-03-19 RX ORDER — BUMETANIDE 1 MG/1
2 TABLET ORAL DAILY
Qty: 60 TABLET | Refills: 3 | Status: SHIPPED | OUTPATIENT
Start: 2019-03-19 | End: 2019-03-28 | Stop reason: SDUPTHER

## 2019-03-19 RX ORDER — POTASSIUM CHLORIDE 750 MG/1
10 TABLET, EXTENDED RELEASE ORAL DAILY
Qty: 30 TABLET | Refills: 3 | Status: SHIPPED | OUTPATIENT
Start: 2019-03-19 | End: 2019-03-28 | Stop reason: SDUPTHER

## 2019-03-19 RX ORDER — BUMETANIDE 1 MG/1
1 TABLET ORAL DAILY
Qty: 90 TABLET | Refills: 3 | Status: SHIPPED | OUTPATIENT
Start: 2019-03-19 | End: 2019-03-19 | Stop reason: DRUGHIGH

## 2019-03-22 ENCOUNTER — CARE COORDINATION (OUTPATIENT)
Dept: CASE MANAGEMENT | Age: 69
End: 2019-03-22

## 2019-03-28 RX ORDER — BUMETANIDE 1 MG/1
2 TABLET ORAL DAILY
Qty: 180 TABLET | Refills: 3 | Status: SHIPPED | OUTPATIENT
Start: 2019-03-28 | End: 2019-04-09

## 2019-03-28 RX ORDER — POTASSIUM CHLORIDE 750 MG/1
10 TABLET, EXTENDED RELEASE ORAL DAILY
Qty: 90 TABLET | Refills: 3 | Status: SHIPPED | OUTPATIENT
Start: 2019-03-28 | End: 2019-04-09

## 2019-03-28 RX ORDER — GABAPENTIN 100 MG/1
100 CAPSULE ORAL 3 TIMES DAILY
Qty: 270 CAPSULE | Refills: 3 | Status: SHIPPED | OUTPATIENT
Start: 2019-03-28 | End: 2022-06-14

## 2019-04-02 ENCOUNTER — NURSE ONLY (OUTPATIENT)
Dept: CARDIOLOGY CLINIC | Age: 69
End: 2019-04-02
Payer: MEDICARE

## 2019-04-02 DIAGNOSIS — Z95.810 BIVENTRICULAR IMPLANTABLE CARDIOVERTER-DEFIBRILLATOR IN SITU: Primary | ICD-10-CM

## 2019-04-02 LAB
BUN BLDV-MCNC: 23 MG/DL
CALCIUM SERPL-MCNC: 9.4 MG/DL
CHLORIDE BLD-SCNC: 87 MMOL/L
CO2: 23 MMOL/L
CREAT SERPL-MCNC: 1.5 MG/DL
GFR CALCULATED: 49
GLUCOSE BLD-MCNC: 102 MG/DL
POTASSIUM SERPL-SCNC: 4.6 MMOL/L
SODIUM BLD-SCNC: 125 MMOL/L

## 2019-04-02 PROCEDURE — 93283 PRGRMG EVAL IMPLANTABLE DFB: CPT | Performed by: INTERNAL MEDICINE

## 2019-04-02 NOTE — PROGRESS NOTES
3.3 years on device   biv icd without at lead   99.5% paced   optivol WNL  Dependent   RV threshold 0.5 @ 0.4  LV 1 @ 0.4  Rv imped 456 shock 71     Has carelink

## 2019-04-04 ENCOUNTER — CARE COORDINATION (OUTPATIENT)
Dept: CASE MANAGEMENT | Age: 69
End: 2019-04-04

## 2019-04-09 ENCOUNTER — TELEPHONE (OUTPATIENT)
Dept: CARDIOLOGY CLINIC | Age: 69
End: 2019-04-09

## 2019-04-09 RX ORDER — BUMETANIDE 1 MG/1
1 TABLET ORAL DAILY
COMMUNITY
End: 2019-05-14 | Stop reason: DRUGHIGH

## 2019-04-09 NOTE — TELEPHONE ENCOUNTER
Son called in to clarify medications  Below instructions given to son again  Med list corrected     April 3, 2019     Le Cad[place], APRN - CNP       10:37 AM   Note      Please notify the patient that his sodium is trending down  I would like for him to go back to Bumex 1 mg daily, stop the Potassium. He may take an additional 1 mg daily for weight gain of 3 pounds in 1 day with increased swelling or SOB. If he takes additional Bumex he will need to take the Potassium 10 mEq with it.   BMP in 2 weeks

## 2019-04-17 ENCOUNTER — TELEPHONE (OUTPATIENT)
Dept: CARDIOLOGY CLINIC | Age: 69
End: 2019-04-17

## 2019-04-17 DIAGNOSIS — I50.32 CHF (CONGESTIVE HEART FAILURE), NYHA CLASS II, CHRONIC, DIASTOLIC (HCC): Primary | ICD-10-CM

## 2019-04-17 LAB
BUN BLDV-MCNC: 19 MG/DL
CALCIUM SERPL-MCNC: 9.3 MG/DL
CHLORIDE BLD-SCNC: 84 MMOL/L
CO2: 20 MMOL/L
CREAT SERPL-MCNC: 1.3 MG/DL
GFR CALCULATED: 58
GLUCOSE BLD-MCNC: 67 MG/DL
POTASSIUM SERPL-SCNC: 5.4 MMOL/L
SODIUM BLD-SCNC: 123 MMOL/L

## 2019-04-18 ENCOUNTER — CARE COORDINATION (OUTPATIENT)
Dept: CASE MANAGEMENT | Age: 69
End: 2019-04-18

## 2019-04-22 ENCOUNTER — TELEPHONE (OUTPATIENT)
Dept: CARDIOLOGY CLINIC | Age: 69
End: 2019-04-22

## 2019-04-22 DIAGNOSIS — E87.1 HYPONATREMIA: Primary | ICD-10-CM

## 2019-04-22 LAB
BUN BLDV-MCNC: 13 MG/DL
CALCIUM SERPL-MCNC: 9.3 MG/DL
CHLORIDE BLD-SCNC: 87 MMOL/L
CO2: 21 MMOL/L
CREAT SERPL-MCNC: 1.2 MG/DL
GFR CALCULATED: 60
GLUCOSE BLD-MCNC: 70 MG/DL
POTASSIUM SERPL-SCNC: 5.3 MMOL/L
SODIUM BLD-SCNC: 122 MMOL/L

## 2019-04-22 NOTE — TELEPHONE ENCOUNTER
Sodium 122Low Panic   mmol/L Final 04/22/2019 12:00 AM Unknown   Chloride 87Low   mmol/L Final 04/22/2019 12:00 AM Unknown   Potassium 5.3High   mmol/L Final 04/22/2019 12:00 AM Unknown   BUN 13  mg/dL Final 04/22/2019 12:00 AM Unknown   CREATININE 1.2   Final 04/22/2019 12:00 AM Unknown   Glucose 70Low   mg/dL Final 04/22/2019 12:00 AM Unknown   CO2 21.0Low   mmol/L Final 04/22/2019 12:00 AM Unknown   Calcium 9.3  mg/dL Final 04/22/2019 12:00 AM Unknown   Gfr Calculated 60   Final

## 2019-04-23 NOTE — TELEPHONE ENCOUNTER
Appointment sched with Dr. Georgiana Brady office [address] 4/30 @ 4486  Left message again for son to call office

## 2019-04-30 ENCOUNTER — OFFICE VISIT (OUTPATIENT)
Dept: NEPHROLOGY | Age: 69
End: 2019-04-30
Payer: MEDICARE

## 2019-04-30 VITALS
BODY MASS INDEX: 24.19 KG/M2 | DIASTOLIC BLOOD PRESSURE: 80 MMHG | OXYGEN SATURATION: 100 % | HEART RATE: 78 BPM | SYSTOLIC BLOOD PRESSURE: 126 MMHG | WEIGHT: 163.8 LBS

## 2019-04-30 DIAGNOSIS — F10.10 EXCESSIVE DRINKING OF ALCOHOL: ICD-10-CM

## 2019-04-30 DIAGNOSIS — E87.5 HYPERKALEMIA: ICD-10-CM

## 2019-04-30 DIAGNOSIS — E87.1 HYPONATREMIA: Primary | ICD-10-CM

## 2019-04-30 DIAGNOSIS — N18.30 CKD (CHRONIC KIDNEY DISEASE), STAGE III (HCC): ICD-10-CM

## 2019-04-30 PROCEDURE — G8598 ASA/ANTIPLAT THER USED: HCPCS | Performed by: INTERNAL MEDICINE

## 2019-04-30 PROCEDURE — 99204 OFFICE O/P NEW MOD 45 MIN: CPT | Performed by: INTERNAL MEDICINE

## 2019-04-30 PROCEDURE — G8420 CALC BMI NORM PARAMETERS: HCPCS | Performed by: INTERNAL MEDICINE

## 2019-04-30 PROCEDURE — 1036F TOBACCO NON-USER: CPT | Performed by: INTERNAL MEDICINE

## 2019-04-30 PROCEDURE — 4040F PNEUMOC VAC/ADMIN/RCVD: CPT | Performed by: INTERNAL MEDICINE

## 2019-04-30 PROCEDURE — G8427 DOCREV CUR MEDS BY ELIG CLIN: HCPCS | Performed by: INTERNAL MEDICINE

## 2019-04-30 PROCEDURE — 1123F ACP DISCUSS/DSCN MKR DOCD: CPT | Performed by: INTERNAL MEDICINE

## 2019-04-30 PROCEDURE — 3017F COLORECTAL CA SCREEN DOC REV: CPT | Performed by: INTERNAL MEDICINE

## 2019-04-30 RX ORDER — ATORVASTATIN CALCIUM 80 MG/1
80 TABLET, FILM COATED ORAL NIGHTLY
COMMUNITY

## 2019-04-30 RX ORDER — IRBESARTAN 300 MG/1
300 TABLET ORAL NIGHTLY
COMMUNITY
End: 2019-04-30

## 2019-04-30 NOTE — PROGRESS NOTES
[facility]    [facility], [address] [place] [address],[address]   [facility], [address]  Outpatient Consult  [phone]  4/30/2019 11:08 AM        Pt Name:    Chris Lopez  YOB: 1950  Primary Care Physician:  Jayna Nj MD     Chief Complaint:   Chief Complaint   Patient presents with    New Patient     Referral by Ms. Faustana luisa for hyponatremia         Background Information/Interval History:   75 yo white male who was referred to us by heart failure clinic for evaluation of hyponatremia. Patient is deaf and mute. Son is here for interpretation of sign language. Son reports patient does not use professional sign language because he does not like it. He prefers his son to be the . Patient was sent to us for evaluation of hyponatremia. He has CAD s/p stents, HFpEF, CKD, Atrial Fibrillation. He has been on diuretics for CHF. At one time he had significant edema but son reports it has improved quite a bit in last 1 month with diuretics. No hx CVA. No hx malignancy. No hx smoking. Drinks 8-12 beers a day- sometimes all day. No hx seizure disorder. No gross hematuria. No kidney stones. No hx NSAID use. Allergies:  Patient has no known allergies.         Past Medical History:  Past Medical History:   Diagnosis Date    CHARISSA (acute kidney injury) ([address]     Arthritis     general    Blood circulation, collateral     CAD (coronary artery disease)     Cardiomyopathy ([address] 8/13/2014    CHF with cardiomyopathy ([address] 2/4/2019    Chronic atrial fibrillation ([address] 8/13/2014    Chronic kidney disease     Congenital heart disease     Deafness congenital 8/13/2014    ETOH abuse 8/13/2014    ETOH abuse     GERD (gastroesophageal reflux disease)     Hyperlipidemia     Hypertension     Medical non-compliance 8/13/2014    Medtronic BiV ICD implanted 9/18/14 [facility] 10/8/2014    Medtronic BiV ICD-no atrial lead 10/8/2014        Past Surgical History:  Past Surgical History:   Procedure Laterality Date    CARDIAC DEFIBRILLATOR PLACEMENT  2005    PACEMAKER INSERTION  2005    SC FOOT/TOES SURGERY PROC UNLISTED Right 11/16/2018    RIGHT FOOT 1ST MTP JOINT ARTHRODESIS performed by Filemon Griffin MD at [address]        Family History:  Family History   Problem Relation Age of Onset    Diabetes Mother     Arthritis Mother     Heart Disease Father     High Blood Pressure Father     High Cholesterol Father     Arthritis Father         Social History:  Social History     Socioeconomic History    Marital status:      Spouse name: Not on file    Number of children: Not on file    Years of education: Not on file    Highest education level: Not on file   Occupational History    Not on file   Social Needs    Financial resource strain: Not on file    Food insecurity:     Worry: Not on file     Inability: Not on file    Transportation needs:     Medical: Not on file     Non-medical: Not on file   Tobacco Use    Smoking status: Never Smoker    Smokeless tobacco: Never Used   Substance and Sexual Activity    Alcohol use:  Yes     Alcohol/week: 9.6 oz     Types: 16 Cans of beer per week     Comment: 16 cans daily    Drug use: No    Sexual activity: Not on file   Lifestyle    Physical activity:     Days per week: Not on file     Minutes per session: Not on file    Stress: Not on file   Relationships    Social connections:     Talks on phone: Not on file     Gets together: Not on file     Attends Yazidi service: Not on file     Active member of club or organization: Not on file     Attends meetings of clubs or organizations: Not on file     Relationship status: Not on file    Intimate partner violence:     Fear of current or ex partner: Not on file     Emotionally abused: Not on file     Physically abused: Not on file     Forced sexual activity: Not on file   Other Topics Concern    Not on file   Social History Narrative    Not on file   No smoking  No illicit drug use  Worked in factories       Review of Systems:  Interpreted by son Lisa Kevin)   Constitutional: no fever or chills  Head: No headaches  Eyes: no blurry vision, no discharge  Ears: no ear pain or hearing changes  Nose: no runny nose or epistaxis  Respiratory: no shortness of breath or cough or sputum production  Cardiovascular: no chest pain  GI: no nausea, no vomiting or diarrhea  : denies any discharge  Skin: no rash  Musculoskeletal: no joint pain, moves all ext  Reports +joint pain/arthritis  Feels dizzy and lightheaded at times when he gets up too fast or turns around too fast     Home Medications:  Prior to Admission medications    Medication Sig Start Date End Date Taking? Authorizing Provider   irbesartan (AVAPRO) 300 MG tablet Take 300 mg by mouth nightly   Yes Historical Provider, MD   atorvastatin (LIPITOR) 80 MG tablet Take 80 mg by mouth nightly   Yes Historical Provider, MD   bumetanide (BUMEX) 1 MG tablet Take 1 mg by mouth daily   Yes Historical Provider, MD   gabapentin (NEURONTIN) 100 MG capsule Take 1 capsule by mouth 3 times daily for 360 days. 3/28/19 3/22/20 Yes Dakota Freire MD   diphenhydrAMINE (BENADRYL) 25 MG capsule Take 25 mg by mouth daily as needed for Itching or Allergies   Yes Historical Provider, MD   metoprolol succinate (TOPROL XL) 100 MG extended release tablet TAKE 1 TABLET EVERY MORNING  AND TAKE 1/2 TABLET IN THE EVENING 12/26/18  Yes Dakota Freire MD   amiodarone (CORDARONE) 200 MG tablet TAKE 1 TABLET EVERY DAY 12/24/18  Yes Irvin Riley PA-C   nitroGLYCERIN (NITROSTAT) 0.4 MG SL tablet PLACE 1 TABLET UNDER THE TONGUE EVERY 5 MINUTES AS NEEDED FOR CHEST PAIN UP TO MAX OF 3 TOTAL DOSES.  IF NO RELIEF AFTER 1 DOSE, CALL 911. 12/24/18  Yes Dyana Riley PA-C   clopidogrel (PLAVIX) 75 MG tablet Take 1 tablet by mouth daily 1/2/18  Yes Dakota Freire MD   fluticasone (FLONASE) 50 MCG/ACT nasal spray 1 spray by Nasal route daily  Patient taking differently: 1 spray by Nasal route daily as needed  3/10/17  Yes Belkis Ashby MD   vitamin B-1 (THIAMINE) 100 MG tablet Take 100 mg by mouth daily   Yes Historical Provider, MD   aspirin 81 MG EC tablet Take 81 mg by mouth daily. Yes Historical Provider, MD        Physical Examination:  VITALS:  /80 (Site: Right Upper Arm, Position: Sitting, Cuff Size: Large Adult)   Pulse 78   Wt 163 lb 12.8 oz (74.3 kg)   SpO2 100%   BMI 24.19 kg/m²   Body mass index is 24.19 kg/m². Wt Readings from Last 3 Encounters:   04/30/19 163 lb 12.8 oz (74.3 kg)   03/04/19 141 lb 12.8 oz (64.3 kg)   02/26/19 153 lb 3.5 oz (69.5 kg)     General Appearance: alert and cooperative with exam, appears comfortable, no distress  Eyes: no icteric sclera, no pallor conjunctiva, no discharge seen from either eye  Ears and Nose: normal external appearance of left ear and right ear and normal appearance of nose  Mouth/Throat: moist oral mucus membranes  Neck: No jugular venous distention, appears symmetric  Lungs: Air entry B/L, no crackles or rales, no use of accessory muscles  Heart: S1, S2 heard  GI: soft, non-tender, no guarding  Extremities: trace  LE edema  Skin: no rash seen on exposed extremities. Skin is warm. Musculo: moves all four extremities, no clubbing or cyanosis of digits of either upper extremity. Neuro: no slurred speech, no facial drooping noted  Psychiatric: normal affect and mood     Laboratory & Diagnostics:  Old progress notes from referring physician reviewed. Echo: EF 55%  Na 122, K 5.3, creat 1.2     Impression/Plan:   1. Hyponatremia: multiple factors to consider including excessive alcohol intake/beer potomania. Additionally drinks soda a day. He drinks upto 12 beers a day sometimes. He is also on diuretics- bumex 1 mg po daily. Will send work-up for hyponatremia- check UA, urine lytes, urine osm, serum osm, TSH, free T4, serum uric acid.  For now I've strongly advised him to reduce alcohol intake and gradually stop using alcohol altogether. Advised fluid restriction. He has 1+ LE edema and clinically he does not appear hypovolemic. Will send the work-up as above. Son reports patient is not ready to reduce alcohol intake. I've clearly discussed risk of hyponatremia including worsening dizziness, gait imbalance, falls, seizure, coma and irreversible brain damage. Son has advised all this to the patient but patient is not ready to quit alcohol. 2. CKD III: serum creat runs around 1.2 to 1.3. Will send UA, urine protein-creatinine ratio and obtain baseline kidney US  3. Hx CAD s/p stents  4. CHF/HFpEF: has 1+ edema, on diuretics. Advised fluid restriction to 1800 ml per day. Bumex has been adjusted few times recently by CHF clinic. 5. Atrial Fibrillation: on beta blockers, on amiodarone  6. Mild hyperkalemia: repeat labs as below  7. Excessive alcohol consumption: discussed at length. Needs to reduce and slowly quit drinking. Thought process was discussed with the patient  Thank you for the referral  Please do not hesitate to contact me if you have any questions or concerns    Plan of care reviewed with son. Orders Placed This Encounter   Procedures    US Renal Complete    Magnesium    Phosphorus    Comprehensive Metabolic Panel    Urinalysis with Microscopic    Protein / creatinine ratio, urine    Brain Natriuretic Peptide    Chloride, Random Urine    Osmolality    TSH without Reflex    Uric Acid    Sodium, urine, random    Osmolality, Urine     Return in about 2 weeks (around 5/14/2019).     Jarett Deng MD  [facility]

## 2019-05-01 ENCOUNTER — TELEPHONE (OUTPATIENT)
Dept: NEPHROLOGY | Age: 69
End: 2019-05-01

## 2019-05-01 NOTE — TELEPHONE ENCOUNTER
I spoke with tony leblanc ([facility])  to set up  for the patients next appointment with dr Thalia Deluca on 05.14.2019 in [facility] states that she has an earlier appointment that day but if absolutely necessary could be to [facility] that day.    tony recommended I call [facility], [place]   I called and spoke with erica from [employer] who took the patients information and date and time of appointment and states that she will call back with the name of the  who will be present

## 2019-05-02 ENCOUNTER — CARE COORDINATION (OUTPATIENT)
Dept: CASE MANAGEMENT | Age: 69
End: 2019-05-02

## 2019-05-03 LAB
ALBUMIN SERPL-MCNC: 4.6 G/DL
ALP BLD-CCNC: 109 U/L
ALT SERPL-CCNC: 10 U/L
ANION GAP SERPL CALCULATED.3IONS-SCNC: NORMAL MMOL/L
AST SERPL-CCNC: 16 U/L
B-TYPE NATRIURETIC PEPTIDE: 5218 PG/ML
BILIRUB SERPL-MCNC: 0.4 MG/DL (ref 0.1–1.4)
BILIRUBIN, URINE: NEGATIVE
BLOOD, URINE: NEGATIVE
BUN BLDV-MCNC: 15 MG/DL
CALCIUM SERPL-MCNC: 9.5 MG/DL
CHLORIDE BLD-SCNC: 98 MMOL/L
CLARITY: CLEAR
CO2: 21 MMOL/L
COLOR: NORMAL
CREAT SERPL-MCNC: 1.5 MG/DL
GFR CALCULATED: 49
GLUCOSE BLD-MCNC: 74 MG/DL
GLUCOSE URINE: NEGATIVE
KETONES, URINE: NEGATIVE
LEUKOCYTE ESTERASE, URINE: NEGATIVE
MAGNESIUM: 2.2 MG/DL
NITRITE, URINE: NEGATIVE
PH UA: 6 (ref 4.5–8)
PHOSPHORUS: 3.8 MG/DL
POTASSIUM SERPL-SCNC: 4.7 MMOL/L
PROTEIN UA: NEGATIVE
SODIUM BLD-SCNC: 133 MMOL/L
SPECIFIC GRAVITY, URINE: 1
TOTAL PROTEIN: 6.6
TSH SERPL DL<=0.05 MIU/L-ACNC: 3.35 UIU/ML
URIC ACID: 6
UROBILINOGEN, URINE: NORMAL

## 2019-05-07 ENCOUNTER — TELEPHONE (OUTPATIENT)
Dept: NEPHROLOGY | Age: 69
End: 2019-05-07

## 2019-05-07 DIAGNOSIS — E87.1 HYPONATREMIA: Primary | ICD-10-CM

## 2019-05-07 DIAGNOSIS — N18.30 CKD (CHRONIC KIDNEY DISEASE), STAGE III (HCC): ICD-10-CM

## 2019-05-07 NOTE — TELEPHONE ENCOUNTER
----- Message from Tania Fleming MD sent at 5/6/2019  6:41 PM EDT -----  Repeat BMP a day before next upcoming visit

## 2019-05-09 DIAGNOSIS — N18.30 CKD (CHRONIC KIDNEY DISEASE), STAGE III (HCC): ICD-10-CM

## 2019-05-13 LAB
BUN BLDV-MCNC: 26 MG/DL
BUN BLDV-MCNC: 26 MG/DL
CALCIUM SERPL-MCNC: 9.5 MG/DL
CALCIUM SERPL-MCNC: 9.5 MG/DL
CHLORIDE BLD-SCNC: 95 MMOL/L
CHLORIDE BLD-SCNC: 95 MMOL/L
CO2: 21 MMOL/L
CO2: 21 MMOL/L
CREAT SERPL-MCNC: 1.6 MG/DL
CREAT SERPL-MCNC: 1.6 MG/DL
GFR CALCULATED: 46
GFR CALCULATED: 46
GLUCOSE BLD-MCNC: 101 MG/DL
GLUCOSE BLD-MCNC: 101 MG/DL
POTASSIUM SERPL-SCNC: 4.8 MMOL/L
POTASSIUM SERPL-SCNC: 4.8 MMOL/L
SODIUM BLD-SCNC: 132 MMOL/L
SODIUM BLD-SCNC: 132 MMOL/L

## 2019-05-14 ENCOUNTER — OFFICE VISIT (OUTPATIENT)
Dept: NEPHROLOGY | Age: 69
End: 2019-05-14
Payer: MEDICARE

## 2019-05-14 VITALS
BODY MASS INDEX: 24.45 KG/M2 | DIASTOLIC BLOOD PRESSURE: 68 MMHG | OXYGEN SATURATION: 98 % | WEIGHT: 165.6 LBS | HEART RATE: 70 BPM | SYSTOLIC BLOOD PRESSURE: 122 MMHG

## 2019-05-14 DIAGNOSIS — N18.30 CKD (CHRONIC KIDNEY DISEASE), STAGE III (HCC): ICD-10-CM

## 2019-05-14 DIAGNOSIS — I50.32 CHRONIC DIASTOLIC (CONGESTIVE) HEART FAILURE (HCC): ICD-10-CM

## 2019-05-14 DIAGNOSIS — E87.1 HYPONATREMIA: Primary | ICD-10-CM

## 2019-05-14 PROCEDURE — 3017F COLORECTAL CA SCREEN DOC REV: CPT | Performed by: INTERNAL MEDICINE

## 2019-05-14 PROCEDURE — G8420 CALC BMI NORM PARAMETERS: HCPCS | Performed by: INTERNAL MEDICINE

## 2019-05-14 PROCEDURE — G8427 DOCREV CUR MEDS BY ELIG CLIN: HCPCS | Performed by: INTERNAL MEDICINE

## 2019-05-14 PROCEDURE — 99213 OFFICE O/P EST LOW 20 MIN: CPT | Performed by: INTERNAL MEDICINE

## 2019-05-14 PROCEDURE — 1036F TOBACCO NON-USER: CPT | Performed by: INTERNAL MEDICINE

## 2019-05-14 PROCEDURE — 4040F PNEUMOC VAC/ADMIN/RCVD: CPT | Performed by: INTERNAL MEDICINE

## 2019-05-14 PROCEDURE — 1123F ACP DISCUSS/DSCN MKR DOCD: CPT | Performed by: INTERNAL MEDICINE

## 2019-05-14 PROCEDURE — G8598 ASA/ANTIPLAT THER USED: HCPCS | Performed by: INTERNAL MEDICINE

## 2019-05-14 RX ORDER — BUMETANIDE 1 MG/1
1 TABLET ORAL 2 TIMES DAILY
Qty: 60 TABLET | Refills: 5 | Status: SHIPPED
Start: 2019-05-14 | End: 2020-07-21

## 2019-05-14 NOTE — PROGRESS NOTES
[facility]    [address] [facility] [address]   [place] [address]  [phone]  Progress Note  5/14/2019 10:26 AM      Pt Name:    Christopher Rodriguez  YOB: 1950  Primary Care Physician:  J Luis Goff MD     Chief Complaint:   Chief Complaint   Patient presents with    Follow-up     hyponatremia         Background Information/Interval History:   77 yo white male who was referred to us by heart failure clinic for evaluation of hyponatremia. Patient is deaf and mute. I am seeing him for second time for follow-up visit. Patient was sent to us for evaluation of hyponatremia. He has CAD s/p stents, HFpEF, CKD, Atrial Fibrillation. He has been on diuretics for CHF. At one time he had significant edema but son reports it has improved quite a bit in last 1 month with diuretics. He is here for follow-up visit. Reports leg swelling and some fluid seeping out of his legs. No chest pain. Son denies any shortness of breath but patient reports some dyspnea on exertion. Still drinking 8-10 beers a day. Son reports he has already had 1 beer this morning. No hx NSAID use.       Past History:  Past Medical History:   Diagnosis Date    CHARISSA (acute kidney injury) ([address]     Arthritis     general    Blood circulation, collateral     CAD (coronary artery disease)     Cardiomyopathy ([address] 8/13/2014    CHF with cardiomyopathy ([address] 2/4/2019    Chronic atrial fibrillation ([address] 8/13/2014    Chronic kidney disease     Congenital heart disease     Deafness congenital 8/13/2014    ETOH abuse 8/13/2014    ETOH abuse     GERD (gastroesophageal reflux disease)     Hyperlipidemia     Hypertension     Medical non-compliance 8/13/2014    Medtronic BiV ICD implanted 9/18/14 [facility] 10/8/2014    Medtronic BiV ICD-no atrial lead 10/8/2014     Past Surgical History:   Procedure Laterality Date    CARDIAC DEFIBRILLATOR PLACEMENT  2005    PACEMAKER INSERTION  2005    [place] FOOT/TOES SURGERY PROC UNLISTED Right 11/16/2018    RIGHT FOOT 1ST MTP JOINT ARTHRODESIS performed by Noreen Shaw MD at [address] [facility]    PVP        VITALS:  /68 (Site: Left Upper Arm, Position: Sitting, Cuff Size: Large Adult)   Pulse 70   Wt 165 lb 9.6 oz (75.1 kg)   SpO2 98%   BMI 24.45 kg/m²   Wt Readings from Last 3 Encounters:   05/14/19 165 lb 9.6 oz (75.1 kg)   04/30/19 163 lb 12.8 oz (74.3 kg)   03/04/19 141 lb 12.8 oz (64.3 kg)     Body mass index is 24.45 kg/m². General Appearance: alert and cooperative with exam, appears comfortable, no distress  Oral: moist oral mucus membranes  Neck: No jugular venous distention  Lungs: Air entry B/L, no crackles or rales, no use of accessory muscles  Heart: S1, S2 heard  GI: soft, non-tender, no guarding  Extremities: B/l 2+ edema     Medications:  Current Outpatient Medications   Medication Sig Dispense Refill    atorvastatin (LIPITOR) 80 MG tablet Take 80 mg by mouth nightly      bumetanide (BUMEX) 1 MG tablet Take 1 mg by mouth daily      gabapentin (NEURONTIN) 100 MG capsule Take 1 capsule by mouth 3 times daily for 360 days. 270 capsule 3    diphenhydrAMINE (BENADRYL) 25 MG capsule Take 25 mg by mouth daily as needed for Itching or Allergies      metoprolol succinate (TOPROL XL) 100 MG extended release tablet TAKE 1 TABLET EVERY MORNING  AND TAKE 1/2 TABLET IN THE EVENING 135 tablet 3    amiodarone (CORDARONE) 200 MG tablet TAKE 1 TABLET EVERY DAY 90 tablet 3    nitroGLYCERIN (NITROSTAT) 0.4 MG SL tablet PLACE 1 TABLET UNDER THE TONGUE EVERY 5 MINUTES AS NEEDED FOR CHEST PAIN UP TO MAX OF 3 TOTAL DOSES.  IF NO RELIEF AFTER 1 DOSE, CALL 911. 25 tablet 3    clopidogrel (PLAVIX) 75 MG tablet Take 1 tablet by mouth daily 90 tablet 3    fluticasone (FLONASE) 50 MCG/ACT nasal spray 1 spray by Nasal route daily (Patient taking differently: 1 spray by Nasal route daily as needed ) 1 Bottle 2    vitamin B-1 (THIAMINE) 100 MG tablet Take 100 mg by mouth daily      aspirin 81 MG EC tablet Take 81 mg by mouth daily. No current facility-administered medications for this visit. Laboratory & Diagnostics:  Echo: EF 55%  Na 122, K 5.3, creat 1.2    May 2019: Na 133, K 4.7, creat 1.5, BNP 5218  UA: no blood, no protein, UPCR <94 mg/g  US: R 8.9, L 7.2, bladder wall thickening     Impression/Plan:   1. Hyponatremia: multiple factors to consider including excessive alcohol intake/beer potomania and hypervolemia. Has HF with preserved EF. Sodium improved with fluid restriction but he is still drinking a lot of alcohol- upto 12 beers per day and then drinks soda. This was all discussed with patient. He strongly needs to restrict fluid intake. He is drinking close to 144 ounces of beer a day and more. Increase bumex 1 mg po BID. Advise salt intake. I've clearly discussed risk of hyponatremia including worsening dizziness, gait imbalance, falls, seizure, coma and irreversible brain damage. Son has advised all this to the patient as well but patient is not ready to quit alcohol. I advised him to increase solute intake  2. CKD III: serum creat runs around 1.2 to 1.3. UA is benign. Advised to avoid NSAIDs. No acute changes on US but has somewhat small kidneys. Renal function partially influenced by volume status and diuretics but clearly he is volume overloaded and needs to increase his diuretics intake for better volume control. 3. Hx CAD s/p stents  4. CHF/HFpEF: has 1+ edema, on diuretics. Advised fluid restriction of 6115-6730 ml per day. He is not compliant with recommendations. Increase bumex 1 mg po BID  5. Atrial Fibrillation: on beta blockers, on amiodarone  6. Excessive alcohol consumption: as above  7. Bladder wall thickening: will need to consider urology evaluation at some point. Patient not ready to decrease alcohol consumption. He understands risks and complications of excessive alcohol intake as above.  Advised for now to increase

## 2019-05-15 RX ORDER — CLOPIDOGREL BISULFATE 75 MG/1
TABLET ORAL
Qty: 90 TABLET | Refills: 0 | Status: SHIPPED | OUTPATIENT
Start: 2019-05-15 | End: 2019-09-10 | Stop reason: SDUPTHER

## 2019-05-21 ENCOUNTER — OFFICE VISIT (OUTPATIENT)
Dept: CARDIOLOGY CLINIC | Age: 69
End: 2019-05-21
Payer: MEDICARE

## 2019-05-21 VITALS
DIASTOLIC BLOOD PRESSURE: 80 MMHG | SYSTOLIC BLOOD PRESSURE: 120 MMHG | BODY MASS INDEX: 23.77 KG/M2 | WEIGHT: 160.94 LBS | HEART RATE: 60 BPM

## 2019-05-21 DIAGNOSIS — I50.40 COMBINED SYSTOLIC AND DIASTOLIC CONGESTIVE HEART FAILURE, UNSPECIFIED HF CHRONICITY (HCC): Primary | ICD-10-CM

## 2019-05-21 DIAGNOSIS — I10 ESSENTIAL HYPERTENSION: ICD-10-CM

## 2019-05-21 DIAGNOSIS — R60.0 LEG EDEMA: ICD-10-CM

## 2019-05-21 PROCEDURE — G8420 CALC BMI NORM PARAMETERS: HCPCS | Performed by: NUCLEAR MEDICINE

## 2019-05-21 PROCEDURE — 4040F PNEUMOC VAC/ADMIN/RCVD: CPT | Performed by: NUCLEAR MEDICINE

## 2019-05-21 PROCEDURE — 1123F ACP DISCUSS/DSCN MKR DOCD: CPT | Performed by: NUCLEAR MEDICINE

## 2019-05-21 PROCEDURE — G8427 DOCREV CUR MEDS BY ELIG CLIN: HCPCS | Performed by: NUCLEAR MEDICINE

## 2019-05-21 PROCEDURE — 1036F TOBACCO NON-USER: CPT | Performed by: NUCLEAR MEDICINE

## 2019-05-21 PROCEDURE — G8598 ASA/ANTIPLAT THER USED: HCPCS | Performed by: NUCLEAR MEDICINE

## 2019-05-21 PROCEDURE — 3017F COLORECTAL CA SCREEN DOC REV: CPT | Performed by: NUCLEAR MEDICINE

## 2019-05-21 PROCEDURE — 99213 OFFICE O/P EST LOW 20 MIN: CPT | Performed by: NUCLEAR MEDICINE

## 2019-05-21 NOTE — PROGRESS NOTES
[address] [facility] CARDIOLOGY  [id number] [place] [address]  Dept: [phone]  Dept Fax: [phone]  Loc: [phone]    Visit Date: 5/21/2019    Alber Carrera is a 76 y.o. male who presents todayfor:  Chief Complaint   Patient presents with    3 Month Follow-Up    Hypertension    Coronary Artery Disease    Congestive Heart Failure     Edema is better  Seeing vein clinic  Wrapping  Leg burning  Heavy drinking   Bp is stable  No chest pain   Know CAD and stents     HPI:  HPI  Past Medical History:   Diagnosis Date    CHARISSA (acute kidney injury) ([address]     Arthritis     general    Blood circulation, collateral     CAD (coronary artery disease)     Cardiomyopathy ([facility] 8/13/2014    CHF with cardiomyopathy ([facility] 2/4/2019    Chronic atrial fibrillation ([facility] 8/13/2014    Chronic kidney disease     Congenital heart disease     Deafness congenital 8/13/2014    ETOH abuse 8/13/2014    ETOH abuse     GERD (gastroesophageal reflux disease)     Hyperlipidemia     Hypertension     Medical non-compliance 8/13/2014    Medtronic BiV ICD implanted 9/18/14 [facility] 10/8/2014    Medtronic BiV ICD-no atrial lead 10/8/2014      Past Surgical History:   Procedure Laterality Date    CARDIAC DEFIBRILLATOR PLACEMENT  2005    PACEMAKER INSERTION  2005    [place] FOOT/TOES SURGERY PROC UNLISTED Right 11/16/2018    RIGHT FOOT 1ST MTP JOINT ARTHRODESIS performed by Carrillo Cruz MD at [facility] SURGERY  2011    PVP     Family History   Problem Relation Age of Onset    Diabetes Mother     Arthritis Mother     Heart Disease Father     High Blood Pressure Father     High Cholesterol Father     Arthritis Father      Social History     Tobacco Use    Smoking status: Never Smoker    Smokeless tobacco: Never Used   Substance Use Topics    Alcohol use:  Yes     Alcohol/week: 9.6 oz     Types: 16 Cans of beer per week     Comment: 16 cans daily      Current Outpatient Medications   Medication Sig Dispense Refill    clopidogrel (PLAVIX) 75 MG tablet TAKE 1 TABLET EVERY DAY 90 tablet 0    bumetanide (BUMEX) 1 MG tablet Take 1 tablet by mouth 2 times daily 60 tablet 5    atorvastatin (LIPITOR) 80 MG tablet Take 80 mg by mouth nightly      gabapentin (NEURONTIN) 100 MG capsule Take 1 capsule by mouth 3 times daily for 360 days. 270 capsule 3    diphenhydrAMINE (BENADRYL) 25 MG capsule Take 25 mg by mouth daily as needed for Itching or Allergies      metoprolol succinate (TOPROL XL) 100 MG extended release tablet TAKE 1 TABLET EVERY MORNING  AND TAKE 1/2 TABLET IN THE EVENING 135 tablet 3    amiodarone (CORDARONE) 200 MG tablet TAKE 1 TABLET EVERY DAY 90 tablet 3    nitroGLYCERIN (NITROSTAT) 0.4 MG SL tablet PLACE 1 TABLET UNDER THE TONGUE EVERY 5 MINUTES AS NEEDED FOR CHEST PAIN UP TO MAX OF 3 TOTAL DOSES. IF NO RELIEF AFTER 1 DOSE, CALL 911. 25 tablet 3    fluticasone (FLONASE) 50 MCG/ACT nasal spray 1 spray by Nasal route daily (Patient taking differently: 1 spray by Nasal route daily as needed ) 1 Bottle 2    vitamin B-1 (THIAMINE) 100 MG tablet Take 100 mg by mouth daily      aspirin 81 MG EC tablet Take 81 mg by mouth daily. No current facility-administered medications for this visit.       No Known Allergies  Health Maintenance   Topic Date Due    Hepatitis C screen  1950    DTaP/Tdap/Td vaccine (1 - Tdap) 06/03/1969    Shingles Vaccine (1 of 2) 06/03/2000    Colon cancer screen colonoscopy  06/03/2000    Pneumococcal 65+ years Vaccine (1 of 2 - PCV13) 06/03/2015    Flu vaccine (Season Ended) 09/01/2019    TSH testing  05/03/2020    Potassium monitoring  05/13/2020    Creatinine monitoring  05/13/2020    Lipid screen  02/05/2024       Subjective:  Review of Systems  General:   No fever, no chills, No fatigue or weight loss  Pulmonary:    some dyspnea, no wheezing  Cardiac:    Denies recent chest pain,   GI:     No nausea or vomiting, no abdominal pain  Neuro:    No dizziness or light headedness,   Musculoskeletal:  No recent active issues  Extremities:   No edema, good peripheral pulses      Objective:  Physical Exam  /80   Pulse 60   Wt 160 lb 15 oz (73 kg)   BMI 23.77 kg/m²   General:   Well developed, well nourished  Lungs:   Clear to auscultation  Heart:    Normal S1 S2, Slight murmur. no rubs, no gallops  Abdomen:   Soft, non tender, no organomegalies, positive bowel sounds  Extremities:   No edema, no cyanosis, good peripheral pulses  Neurological:   Awake, alert, oriented. No obvious focal deficits  Musculoskelatal:  No obvious deformities    Assessment:      Diagnosis Orders   1. Combined systolic and diastolic congestive heart failure, unspecified HF chronicity ([address]     2. Essential hypertension     3. Leg edema     cardiac fair for  n ow    Plan:  No follow-ups on file. As above  Continue risk factor modification and medical management  Thank you for allowing me to participate in the care of your patient. Please don't hesitate to contact me regarding any further issues related to the patient care    Orders Placed:  No orders of the defined types were placed in this encounter. Medications Prescribed:  No orders of the defined types were placed in this encounter. Discussed use, benefit, and side effects of prescribed medications. All patient questions answered. Pt voicedunderstanding. Instructed to continue current medications, diet and exercise. Continue risk factor modification and medical management. Patient agreed with treatment plan. Follow up as directed.     Electronically signedby Latosha Bloom MD on 5/21/2019 at 3:16 PM

## 2019-06-24 ENCOUNTER — OFFICE VISIT (OUTPATIENT)
Dept: CARDIOLOGY CLINIC | Age: 69
End: 2019-06-24
Payer: MEDICARE

## 2019-06-24 VITALS
BODY MASS INDEX: 23.22 KG/M2 | DIASTOLIC BLOOD PRESSURE: 60 MMHG | HEART RATE: 88 BPM | WEIGHT: 156.8 LBS | HEIGHT: 69 IN | OXYGEN SATURATION: 99 % | SYSTOLIC BLOOD PRESSURE: 104 MMHG

## 2019-06-24 DIAGNOSIS — I50.32 CHF (CONGESTIVE HEART FAILURE), NYHA CLASS II, CHRONIC, DIASTOLIC (HCC): Primary | ICD-10-CM

## 2019-06-24 LAB
ANION GAP SERPL CALCULATED.3IONS-SCNC: 17 MEQ/L (ref 8–16)
BUN BLDV-MCNC: 19 MG/DL (ref 7–22)
CALCIUM SERPL-MCNC: 8.8 MG/DL (ref 8.5–10.5)
CHLORIDE BLD-SCNC: 87 MEQ/L (ref 98–111)
CO2: 18 MEQ/L (ref 23–33)
CREAT SERPL-MCNC: 1.6 MG/DL (ref 0.4–1.2)
GFR SERPL CREATININE-BSD FRML MDRD: 43 ML/MIN/1.73M2
GLUCOSE BLD-MCNC: 82 MG/DL (ref 70–108)
POTASSIUM SERPL-SCNC: 4.6 MEQ/L (ref 3.5–5.2)
SODIUM BLD-SCNC: 122 MEQ/L (ref 135–145)

## 2019-06-24 PROCEDURE — 99213 OFFICE O/P EST LOW 20 MIN: CPT | Performed by: NURSE PRACTITIONER

## 2019-06-24 PROCEDURE — 1036F TOBACCO NON-USER: CPT | Performed by: NURSE PRACTITIONER

## 2019-06-24 PROCEDURE — 1123F ACP DISCUSS/DSCN MKR DOCD: CPT | Performed by: NURSE PRACTITIONER

## 2019-06-24 PROCEDURE — 4040F PNEUMOC VAC/ADMIN/RCVD: CPT | Performed by: NURSE PRACTITIONER

## 2019-06-24 PROCEDURE — 36415 COLL VENOUS BLD VENIPUNCTURE: CPT | Performed by: NURSE PRACTITIONER

## 2019-06-24 PROCEDURE — G8598 ASA/ANTIPLAT THER USED: HCPCS | Performed by: NURSE PRACTITIONER

## 2019-06-24 PROCEDURE — 3017F COLORECTAL CA SCREEN DOC REV: CPT | Performed by: NURSE PRACTITIONER

## 2019-06-24 PROCEDURE — G8420 CALC BMI NORM PARAMETERS: HCPCS | Performed by: NURSE PRACTITIONER

## 2019-06-24 PROCEDURE — G8427 DOCREV CUR MEDS BY ELIG CLIN: HCPCS | Performed by: NURSE PRACTITIONER

## 2019-06-24 RX ORDER — TAMSULOSIN HYDROCHLORIDE 0.4 MG/1
0.4 CAPSULE ORAL DAILY
Qty: 30 CAPSULE | Refills: 5 | Status: SHIPPED | OUTPATIENT
Start: 2019-06-24

## 2019-06-24 RX ORDER — CETIRIZINE HYDROCHLORIDE 10 MG/1
10 TABLET ORAL DAILY
COMMUNITY

## 2019-06-24 RX ORDER — POTASSIUM CHLORIDE 750 MG/1
1 TABLET, EXTENDED RELEASE ORAL DAILY
COMMUNITY
Start: 2019-06-21 | End: 2020-11-10 | Stop reason: SDUPTHER

## 2019-06-24 ASSESSMENT — ENCOUNTER SYMPTOMS
ABDOMINAL PAIN: 0
ABDOMINAL DISTENTION: 0
APNEA: 0
WHEEZING: 0
CHEST TIGHTNESS: 0
NAUSEA: 0
COLOR CHANGE: 0
SHORTNESS OF BREATH: 1
COUGH: 0

## 2019-06-24 NOTE — PROGRESS NOTES
[facility] Heart Failure Clinic       Visit Date: 6/24/2019  Cardiologist:   [facility]  Primary Care Physician: Dr. Domingo Hooks MD    Gretel Estrella is a 71 y.o. male who presents today for:  Chief Complaint   Patient presents with    Congestive Heart Failure       HPI:   Gretel Estrella is a 71 y.o. male who presents to the office for a follow up visit in the heart failure clinic. Hx A fib, HTN, CAD PCI stents, HFpEF 50-55%, CKD, ETOH. He is here with his son, pt is deaf mute but does not uses American sigh language. He is following with Nephology for CKD and hyponatremia. He is still drinking alcohol 8 beers a day. He complains of itching on his body, taking Zyrtec. He saw a \"specialist\" per the son. He complains of difficulty urinating, dribbling, used to take Flomax which did help. He sleeps in a bed. Following with would clinic twice a week in [facility]. Legs are wrapped today.      Patient has:  Last hospital admission related to Heart Failure:  Feb 2019  Chest Pain: no  Worsening SOB/orthopnea/PND: no  Edema: improved  Any extra diuretic use: no  Weight gain: no  Compliant checking home weight: no  Fatigue: no  Abdominal bloating: sometimes  Appetite: goof  Difficulty sleeping: no  Cough: occasional   Compliant checking blood pressure: no  Any refills on CHF medications needed at this time: no    Past Medical History:   Diagnosis Date    CHARISSA (acute kidney injury) ([address]     Arthritis     general    Blood circulation, collateral     CAD (coronary artery disease)     Cardiomyopathy ([address] 8/13/2014    CHF with cardiomyopathy ([address] 2/4/2019    Chronic atrial fibrillation ([address] 8/13/2014    Chronic kidney disease     Congenital heart disease     Deafness congenital 8/13/2014    ETOH abuse 8/13/2014    ETOH abuse     GERD (gastroesophageal reflux disease)     Hyperlipidemia     Hypertension     Medical non-compliance 8/13/2014    Medtronic BiV ICD implanted 9/18/14 [facility] 10/8/2014    Medtronic BiV ICD-no atrial lead 10/8/2014     Past Surgical History:   Procedure Laterality Date    CARDIAC DEFIBRILLATOR PLACEMENT  2005    PACEMAKER INSERTION  2005    [place] FOOT/TOES SURGERY PROC UNLISTED Right 11/16/2018    RIGHT FOOT 1ST MTP JOINT ARTHRODESIS performed by Taras Hickman MD at [address] [place] [address]    PVP     Family History   Problem Relation Age of Onset    Diabetes Mother     Arthritis Mother     Heart Disease Father     High Blood Pressure Father     High Cholesterol Father     Arthritis Father      Social History     Tobacco Use    Smoking status: Never Smoker    Smokeless tobacco: Never Used   Substance Use Topics    Alcohol use: Yes     Alcohol/week: 9.6 oz     Types: 16 Cans of beer per week     Comment: 16 cans daily     Current Outpatient Medications   Medication Sig Dispense Refill    tamsulosin (FLOMAX) 0.4 MG capsule Take 1 capsule by mouth daily 30 capsule 5    cetirizine (ZYRTEC) 10 MG tablet Take 10 mg by mouth daily      clopidogrel (PLAVIX) 75 MG tablet TAKE 1 TABLET EVERY DAY 90 tablet 0    bumetanide (BUMEX) 1 MG tablet Take 1 tablet by mouth 2 times daily 60 tablet 5    atorvastatin (LIPITOR) 80 MG tablet Take 80 mg by mouth nightly      gabapentin (NEURONTIN) 100 MG capsule Take 1 capsule by mouth 3 times daily for 360 days. 270 capsule 3    diphenhydrAMINE (BENADRYL) 25 MG capsule Take 25 mg by mouth daily as needed for Itching or Allergies      metoprolol succinate (TOPROL XL) 100 MG extended release tablet TAKE 1 TABLET EVERY MORNING  AND TAKE 1/2 TABLET IN THE EVENING 135 tablet 3    amiodarone (CORDARONE) 200 MG tablet TAKE 1 TABLET EVERY DAY 90 tablet 3    nitroGLYCERIN (NITROSTAT) 0.4 MG SL tablet PLACE 1 TABLET UNDER THE TONGUE EVERY 5 MINUTES AS NEEDED FOR CHEST PAIN UP TO MAX OF 3 TOTAL DOSES.  IF NO RELIEF AFTER 1 DOSE, CALL 911. 25 tablet 3    fluticasone (FLONASE) 50 MCG/ACT nasal spray 1 spray by Nasal route daily (Patient taking differently: 1 spray by Nasal route daily as needed ) 1 Bottle 2    vitamin B-1 (THIAMINE) 100 MG tablet Take 100 mg by mouth daily      aspirin 81 MG EC tablet Take 81 mg by mouth daily.  potassium chloride (KLOR-CON M) 10 MEQ extended release tablet Take 1 tablet by mouth daily       No current facility-administered medications for this visit. No Known Allergies    SUBJECTIVE:   Review of Systems   Constitutional: Negative for activity change, appetite change, fatigue and fever. HENT: Negative for congestion. Deaf mute - son here to interpret as they do not use American sigh language    Respiratory: Positive for shortness of breath. Negative for apnea, cough, chest tightness and wheezing. Cardiovascular: Positive for leg swelling. Negative for chest pain and palpitations. Gastrointestinal: Negative for abdominal distention, abdominal pain and nausea. Genitourinary: Positive for difficulty urinating (slow stream, dribbling). Negative for dysuria. Musculoskeletal: Negative for arthralgias and gait problem. Skin: Negative for color change. Scratch marks from itching  Legs wrapped - following with would clinic    Neurological: Negative for dizziness, numbness and headaches. Psychiatric/Behavioral: Negative for agitation, confusion and sleep disturbance. The patient is not nervous/anxious. OBJECTIVE:   Today's Vitals:  /60   Pulse 88   Ht 5' 9\" (1.753 m)   Wt 156 lb 12.8 oz (71.1 kg)   SpO2 99%   BMI 23.16 kg/m²     Physical Exam   Constitutional: He is oriented to person, place, and time. He appears well-developed and well-nourished. HENT:   Head: Normocephalic and atraumatic. Eyes: Pupils are equal, round, and reactive to light. Neck: Normal range of motion. No JVD present. Cardiovascular: Normal rate and normal heart sounds. No murmur heard. +ICD   Pulmonary/Chest: Effort normal. No respiratory distress. He has no rales.    Abdominal: DOPPLER: The transpulmonic velocity was   within the normal range with no evidence for regurgitation.      Left Atrium   Moderately dilated left atrium.      Left Ventricle   Left ventricle size is normal.   Systolic function is low- normal.   Ejection fraction is visually estimated at 50-55%.      Right Atrium   Right atrial size was normal.      Right Ventricle   The right ventricular size was normal with normal systolic function and   wall thickness.   Pacer Wire visualized in right ventricle.      Pericardial Effusion   The pericardium was normal in appearance with no evidence of a pericardial   effusion.      Pleural Effusion   No evidence of pleural effusion.      Aorta / Great Vessels   -Aortic root dimension within normal limits.   -IVC size is within normal limits with normal respiratory phasic changes.       Results reviewed:  BNP:   Lab Results   Component Value Date    BNP 5,218 05/03/2019    PROBNP 2316.0 (H) 03/04/2019     CBC:   Lab Results   Component Value Date    WBC 4.7 02/06/2019    RBC 3.50 02/06/2019    HGB 11.6 02/06/2019    HCT 34.3 02/06/2019     02/06/2019     CMP:    Lab Results   Component Value Date     05/13/2019     05/13/2019    K 4.8 05/13/2019    K 4.8 05/13/2019    CL 95 05/13/2019    CL 95 05/13/2019    CO2 21.0 05/13/2019    CO2 21.0 05/13/2019    BUN 26 05/13/2019    BUN 26 05/13/2019    CREATININE 1.6 05/13/2019    CREATININE 1.6 05/13/2019    LABGLOM 46 05/13/2019    LABGLOM 46 05/13/2019    LABGLOM 46 03/04/2019    GLUCOSE 101 05/13/2019    GLUCOSE 101 05/13/2019    CALCIUM 9.5 05/13/2019    CALCIUM 9.5 05/13/2019     Hepatic Function Panel:    Lab Results   Component Value Date    ALKPHOS 109 05/03/2019    ALT 10 05/03/2019    AST 16 05/03/2019    PROT 6.9 02/04/2019    BILITOT 0.4 05/03/2019    BILIDIR <0.2 02/04/2019    LABALBU 4.6 05/03/2019     Magnesium:    Lab Results   Component Value Date    MG 2.2 05/03/2019     PT/INR:    Lab Results   Component Value Date    INR 0.97 11/13/2017     Lipids:    Lab Results   Component Value Date    TRIG 79 02/05/2019    HDL 40 02/05/2019    LDLCALC 31 02/05/2019       ASSESSMENT AND PLAN:   The patient's condition/symptoms are Stable: No clinical evidence of fluid overload today. Continue current medical regimen without changes at present time.      Diagnosis Orders   1. CHF (congestive heart failure), NYHA class II, chronic, diastolic (HCC)  Basic Metabolic Panel       Plan:  · BMP today. Bumex 1 mg bid, Toprol 100 mg AM - 50 mg PM, Potassium 10 mEq daily, on Amiodarone and Plavix. HF Zones reviewed. Flomax. They prefer to follow with someone more locally as they have \"many\" doctors. No f/u made, will see prn. · Daily weights  · Fluid restriction of 2 Liters per day  · Limit sodium in diet to around 0527-3535 mg/day  · Monitor BP  · Activity as tolerated     Patient was instructed to call the [employer] for changes in the following symptoms:   Weight gain of 3 pounds in 1 day or 5 pounds in 1 week  Increased shortness of breath  Shortness of breath while laying down  Cough  Chest pain  Swelling in feet, ankles or legs  Tenderness or bloating in the abdomen  Fatigue   Decreased appetite or nausea   Confusion      Return if symptoms worsen or fail to improve. or sooner if needed     Patient given educational materials - see patient instructions. We discussed the importance of weighing oneself and recording daily. We also discussed the importance of a lowsodium diet, higher sodium foods to avoid and better low sodium food options. Discussed use, benefit, and side effects of prescribed medications. All patient questions answered. Patient verbalizes understanding of plan of care using teach back method, and is agreeable to the treatment plan.        Electronicallysigned by SUSAN Lynn CNP on 6/24/2019 at 1:53 PM

## 2019-06-24 NOTE — PATIENT INSTRUCTIONS
You may receive a survey regarding the care you received during your visit. Your input is valuable to us. We encourage you to complete and return your survey. We hope you will choose us in the future for your healthcare needs.     Continue:  · Continue current medications  · Daily weights and record  · Fluid restriction of 2390-7261 ml per day or as directed by Nephrology   · Monitor BP  · Activity as tolerated     Call the [facility] for any of the following symptoms: [phone]   Weight gain of 3 pounds in 1 day or 5 pounds in 1 week   Increased shortness of breath   Shortness of breath while laying down   Cough   Chest pain   Swelling in feet, ankles or legs   Tenderness or bloating in the abdomen   Fatigue    Decreased appetite or nausea    Confusion

## 2019-06-24 NOTE — PROGRESS NOTES
Venipuncture obtained from left hand. Patient tolerated procedure without complications or complaints.

## 2019-06-25 ENCOUNTER — TELEPHONE (OUTPATIENT)
Dept: NEPHROLOGY | Age: 69
End: 2019-06-25

## 2019-06-25 NOTE — RESULT ENCOUNTER NOTE
I Called patient on his cell and home number as listed   He did not answer  Sodium chronically low but now lower  Call him on Tuesday Morning and find out how is he doing  Is he still drinking alcohol?   Send him to ED for recheck

## 2019-06-25 NOTE — TELEPHONE ENCOUNTER
Patients son has been informed and undertands and states that the patient is currently at the dermatologist for itching and that the patients legs have gone down some and are no longer \"pussing\"  Patients son states that he is still drinking not as much but he is still drinking and that he'll never stop drinking alcohol

## 2019-06-26 ENCOUNTER — TELEPHONE (OUTPATIENT)
Dept: CARDIOLOGY CLINIC | Age: 69
End: 2019-06-26

## 2019-06-26 NOTE — TELEPHONE ENCOUNTER
I spoke with Toney James (son) to confirm the patients appointment and asked if the patient went to the er and Toney James states that the patient did go to the er and wanted to know why he had to go because his sodium had been lower than that back in April.  elizabeth states that the patient had to cans of beer prior to going and once he got to the er his sodium level was down to 106 but then the sodium level came back up and the patient was released    elizabeth states that he doesn't think the patient will be back for any more appointments with dr Adarsh Nichols but he will call back later today or tomorrow with this information after he has had a chance to speak with the patient again

## 2019-06-26 NOTE — TELEPHONE ENCOUNTER
Patient of Dr. Simeon Mccarty. Patient needs scheduled to see Dr. Whit Solomon to discuss Mitral Regurgitation. LM for patient to call our office back. Would like patient to come in 7/2/2019 at 1130.

## 2019-06-28 NOTE — TELEPHONE ENCOUNTER
Spoke with pt's daughter, Sebastian Simons, pr refused consult. \"he does not want to see anymore doctors.

## 2019-06-28 NOTE — TELEPHONE ENCOUNTER
Lm on pt vm, also contacted pt daughter lucille, and she will relay message and pt will be here. Also called and left detailed message on Lance  for .

## 2019-07-01 ENCOUNTER — TELEPHONE (OUTPATIENT)
Dept: CARDIOLOGY CLINIC | Age: 69
End: 2019-07-01

## 2019-07-02 ENCOUNTER — OFFICE VISIT (OUTPATIENT)
Dept: NEPHROLOGY | Age: 69
End: 2019-07-02
Payer: MEDICARE

## 2019-07-02 VITALS
WEIGHT: 169.1 LBS | OXYGEN SATURATION: 97 % | HEART RATE: 72 BPM | BODY MASS INDEX: 24.97 KG/M2 | SYSTOLIC BLOOD PRESSURE: 118 MMHG | DIASTOLIC BLOOD PRESSURE: 66 MMHG

## 2019-07-02 DIAGNOSIS — N18.30 CKD (CHRONIC KIDNEY DISEASE), STAGE III (HCC): ICD-10-CM

## 2019-07-02 DIAGNOSIS — E87.1 HYPONATREMIA: Primary | ICD-10-CM

## 2019-07-02 PROCEDURE — 4040F PNEUMOC VAC/ADMIN/RCVD: CPT | Performed by: INTERNAL MEDICINE

## 2019-07-02 PROCEDURE — G8598 ASA/ANTIPLAT THER USED: HCPCS | Performed by: INTERNAL MEDICINE

## 2019-07-02 PROCEDURE — G8420 CALC BMI NORM PARAMETERS: HCPCS | Performed by: INTERNAL MEDICINE

## 2019-07-02 PROCEDURE — 3017F COLORECTAL CA SCREEN DOC REV: CPT | Performed by: INTERNAL MEDICINE

## 2019-07-02 PROCEDURE — 99214 OFFICE O/P EST MOD 30 MIN: CPT | Performed by: INTERNAL MEDICINE

## 2019-07-02 PROCEDURE — 1036F TOBACCO NON-USER: CPT | Performed by: INTERNAL MEDICINE

## 2019-07-02 PROCEDURE — G8427 DOCREV CUR MEDS BY ELIG CLIN: HCPCS | Performed by: INTERNAL MEDICINE

## 2019-07-02 PROCEDURE — 1123F ACP DISCUSS/DSCN MKR DOCD: CPT | Performed by: INTERNAL MEDICINE

## 2019-07-02 RX ORDER — FOLIC ACID 1 MG/1
1 TABLET ORAL DAILY
COMMUNITY

## 2019-07-02 RX ORDER — PANTOPRAZOLE SODIUM 20 MG/1
20 TABLET, DELAYED RELEASE ORAL DAILY
COMMUNITY
End: 2020-07-21

## 2019-07-02 RX ORDER — M-VIT,TX,IRON,MINS/CALC/FOLIC 27MG-0.4MG
1 TABLET ORAL DAILY
COMMUNITY

## 2019-07-02 RX ORDER — PREDNISONE 20 MG/1
TABLET ORAL
COMMUNITY
Start: 2019-06-25 | End: 2020-07-21

## 2019-07-02 NOTE — PROGRESS NOTES
Fibrillation: on beta blockers, on amiodarone  6. Excessive alcohol consumption: as above  7. Bladder wall thickening: discussed seeing urology. He does not want to see one at this time. He was advised that this could be cancer related and will need evaluation but he declined.      Not sure what I can do at this time. He is not willing to change anything. Risks of low sodium discussed. Son is helpful but patient does not want to listen to his son either. Son reports that patient does not want to make follow-up appt at this time. He says he will call our office if he changes his mind.      Bryant Guzman MD  [facility]

## 2019-07-23 ENCOUNTER — PROCEDURE VISIT (OUTPATIENT)
Dept: CARDIOLOGY CLINIC | Age: 69
End: 2019-07-23
Payer: MEDICARE

## 2019-07-23 ENCOUNTER — TELEPHONE (OUTPATIENT)
Dept: CARDIOLOGY CLINIC | Age: 69
End: 2019-07-23

## 2019-07-23 DIAGNOSIS — Z95.810 BIVENTRICULAR IMPLANTABLE CARDIOVERTER-DEFIBRILLATOR IN SITU: Primary | ICD-10-CM

## 2019-07-23 PROCEDURE — 93296 REM INTERROG EVL PM/IDS: CPT | Performed by: INTERNAL MEDICINE

## 2019-07-23 PROCEDURE — 93295 DEV INTERROG REMOTE 1/2/MLT: CPT | Performed by: INTERNAL MEDICINE

## 2019-07-23 NOTE — TELEPHONE ENCOUNTER
Steffany Peter LPN   Medtronic BiV ICD-no atrial lead   Dx    Progress Notes        DR Polo Bonner PT  MEDTRONIC BIV ICD/ NO ATRIAL LEAD  BATTERY 2.8 YRS REMAINING  RV IMPEDENCE 437  LV IMPEDENCE 323  RV WAVES 9.3  VVIR   1 VENT HIGH RATE EPISODE ON 04/08/19  OPTIVOL IS ELEVATED  PT DOES SEE [facility]ENO IN CHF CLINIC.  THIS MESSAGE ROUTED TO NENO IF CHF IN A TELEPHONE ENCOUNTER AS WELL        RESULTS LOCATED UNDER MEDIA TAB IN PTS CHART

## 2019-07-24 ENCOUNTER — TELEPHONE (OUTPATIENT)
Dept: CARDIOLOGY CLINIC | Age: 69
End: 2019-07-24

## 2019-07-24 NOTE — TELEPHONE ENCOUNTER
[facility] is requesting Dmitriy's last office note to be faxed to them.   Fax # [phone]

## 2019-07-25 RX ORDER — METOPROLOL SUCCINATE 50 MG/1
50 TABLET, EXTENDED RELEASE ORAL DAILY
COMMUNITY
End: 2019-07-25 | Stop reason: SDUPTHER

## 2019-07-25 RX ORDER — METOPROLOL SUCCINATE 50 MG/1
TABLET, EXTENDED RELEASE ORAL
Qty: 60 TABLET | Refills: 3 | Status: ON HOLD
Start: 2019-07-25 | End: 2020-11-06 | Stop reason: HOSPADM

## 2019-07-25 NOTE — TELEPHONE ENCOUNTER
Spoke with Will Caballero at [facility] and she is asking these questions. 1. You want him to hold the metoprolol until BP is up? 2. Then when metoprolol is resumed you want him to take 50 in the am and 25 in the pm?    3. What is a good BP for this patient?

## 2019-08-20 RX ORDER — IRBESARTAN 300 MG/1
TABLET ORAL
Qty: 90 TABLET | Refills: 3 | Status: SHIPPED | OUTPATIENT
Start: 2019-08-20 | End: 2020-06-22

## 2019-08-23 ENCOUNTER — TELEPHONE (OUTPATIENT)
Dept: CARDIOLOGY CLINIC | Age: 69
End: 2019-08-23

## 2019-09-10 RX ORDER — CLOPIDOGREL BISULFATE 75 MG/1
75 TABLET ORAL DAILY
Qty: 90 TABLET | Refills: 3 | Status: ON HOLD | OUTPATIENT
Start: 2019-09-10 | End: 2020-11-06 | Stop reason: HOSPADM

## 2019-09-10 NOTE — TELEPHONE ENCOUNTER
9/10/19   Dmitriy Harris called requesting a refill on the following medications:  Requested Prescriptions     Pending Prescriptions Disp Refills    clopidogrel (PLAVIX) 75 MG tablet 90 tablet 0     Pharmacy verified:  [id number]    REQUESTING #90 DAY     Date of last visit:  5/21/19  Date of next visit (if applicable):  41/38/02  blm

## 2019-10-16 ENCOUNTER — TELEPHONE (OUTPATIENT)
Dept: CARDIOLOGY CLINIC | Age: 69
End: 2019-10-16

## 2019-10-24 ENCOUNTER — PROCEDURE VISIT (OUTPATIENT)
Dept: CARDIOLOGY CLINIC | Age: 69
End: 2019-10-24
Payer: MEDICARE

## 2019-10-24 DIAGNOSIS — Z95.810 BIVENTRICULAR IMPLANTABLE CARDIOVERTER-DEFIBRILLATOR IN SITU: Primary | ICD-10-CM

## 2019-10-24 PROCEDURE — 93295 DEV INTERROG REMOTE 1/2/MLT: CPT | Performed by: INTERNAL MEDICINE

## 2019-10-24 PROCEDURE — 93296 REM INTERROG EVL PM/IDS: CPT | Performed by: INTERNAL MEDICINE

## 2019-11-08 RX ORDER — AMIODARONE HYDROCHLORIDE 200 MG/1
TABLET ORAL
Qty: 90 TABLET | Refills: 0 | Status: SHIPPED | OUTPATIENT
Start: 2019-11-08 | End: 2020-07-21

## 2019-12-05 RX ORDER — TAMSULOSIN HYDROCHLORIDE 0.4 MG/1
CAPSULE ORAL
Qty: 90 CAPSULE | Refills: 0 | OUTPATIENT
Start: 2019-12-05

## 2019-12-20 RX ORDER — TAMSULOSIN HYDROCHLORIDE 0.4 MG/1
0.4 CAPSULE ORAL DAILY
Qty: 30 CAPSULE | Refills: 5 | OUTPATIENT
Start: 2019-12-20

## 2020-01-07 ENCOUNTER — PROCEDURE VISIT (OUTPATIENT)
Dept: CARDIOLOGY CLINIC | Age: 70
End: 2020-01-07

## 2020-01-07 NOTE — PROGRESS NOTES
NC <91 days     . Cleo Butcher Battery longevity:2.3 years on device     No atrial lead   RV impedance 513  LV imped 323  Shock 74    R wave sensing 9.3    0 % atrial paced  99.4 % RV paced     RV threshold 0.625V at 0.4ms  LV threshold 1.25 @ 0.4    optivol WNL

## 2020-03-17 ENCOUNTER — PROCEDURE VISIT (OUTPATIENT)
Dept: CARDIOLOGY CLINIC | Age: 70
End: 2020-03-17

## 2020-05-28 ENCOUNTER — TELEPHONE (OUTPATIENT)
Dept: CARDIOLOGY CLINIC | Age: 70
End: 2020-05-28

## 2020-05-28 NOTE — TELEPHONE ENCOUNTER
[facility] faxed for clearance for pt to hold plavix for 7 days for Lumbar Epidural.  Pt last appt 5/21/19 next appt 7/21/20    Dr. Buster Perez is pt cleared?

## 2020-06-22 RX ORDER — IRBESARTAN 300 MG/1
TABLET ORAL
Qty: 90 TABLET | Refills: 0 | Status: SHIPPED | OUTPATIENT
Start: 2020-06-22 | End: 2021-01-25 | Stop reason: SDUPTHER

## 2020-07-21 ENCOUNTER — OFFICE VISIT (OUTPATIENT)
Dept: CARDIOLOGY CLINIC | Age: 70
End: 2020-07-21
Payer: MEDICARE

## 2020-07-21 VITALS — DIASTOLIC BLOOD PRESSURE: 78 MMHG | HEART RATE: 84 BPM | SYSTOLIC BLOOD PRESSURE: 144 MMHG

## 2020-07-21 PROCEDURE — 4040F PNEUMOC VAC/ADMIN/RCVD: CPT | Performed by: NUCLEAR MEDICINE

## 2020-07-21 PROCEDURE — G8428 CUR MEDS NOT DOCUMENT: HCPCS | Performed by: NUCLEAR MEDICINE

## 2020-07-21 PROCEDURE — 99214 OFFICE O/P EST MOD 30 MIN: CPT | Performed by: NUCLEAR MEDICINE

## 2020-07-21 PROCEDURE — G8421 BMI NOT CALCULATED: HCPCS | Performed by: NUCLEAR MEDICINE

## 2020-07-21 PROCEDURE — 1036F TOBACCO NON-USER: CPT | Performed by: NUCLEAR MEDICINE

## 2020-07-21 PROCEDURE — 1123F ACP DISCUSS/DSCN MKR DOCD: CPT | Performed by: NUCLEAR MEDICINE

## 2020-07-21 PROCEDURE — 3017F COLORECTAL CA SCREEN DOC REV: CPT | Performed by: NUCLEAR MEDICINE

## 2020-07-21 RX ORDER — BETHANECHOL CHLORIDE 25 MG/1
25 TABLET ORAL 3 TIMES DAILY
COMMUNITY
End: 2020-11-10 | Stop reason: SDUPTHER

## 2020-07-21 NOTE — PROGRESS NOTES
[address] [place] [address]  Dept: [phone]  Dept Fax: [phone]  Loc: [phone]    Visit Date: 7/21/2020    Srinivas Monroe is a 79 y.o. male who presents todayfor:  Chief Complaint   Patient presents with    Check-Up    Cardiomyopathy    Hypertension     Compliance issues  Not taking the meds all the time  Skips a lot  No chest pain   Still drinking heavy   Fatigue   No use of nitro   previous stent s  High risk patient   BP seems fair   No dizziness  No syncope  Does have hyperlipidemia  On statins   No ICD shocks   HPI:  HPI  Past Medical History:   Diagnosis Date    CHARISSA (acute kidney injury) ([address]     Arthritis     general    Blood circulation, collateral     CAD (coronary artery disease)     Cardiomy[facility] ([facility] 8/13/2014    CHF with cardiomyopathy ([facility] 2/4/2019    Chronic atrial fibrillation ([facility] 8/13/2014    Chronic kidney disease     Congenital heart disease     Deafness congenital 8/13/2014    ETOH abuse 8/13/2014    ETOH abuse     GERD (gastroesophageal reflux disease)     Hyperlipidemia     Hypertension     Medical non-compliance 8/13/2014    Medtronic BiV ICD implanted 9/18/14 [facility] 10/8/2014    Medtronic BiV ICD-no atrial lead 10/8/2014      Past Surgical History:   Procedure Laterality Date    CARDIAC DEFIBRILLATOR PLACEMENT  2005    PACEMAKER INSERTION  2005    [place] FOOT/TOES SURGERY PROC UNLISTED Right 11/16/2018    RIGHT FOOT 1ST MTP JOINT ARTHRODESIS performed by Melisa Cristobal MD at [address] SURGERY  2011    PVP     Family History   Problem Relation Age of Onset    Diabetes Mother     Arthritis Mother     Heart Disease Father     High Blood Pressure Father     High Cholesterol Father     Arthritis Father      Social History     Tobacco Use    Smoking status: Never Smoker    Smokeless tobacco: Never Used   Substance Use Topics    Alcohol use:  Yes and side effects of prescribed medications. All patient questions answered. Pt voicedunderstanding. Instructed to continue current medications, diet and exercise. Continue risk factor modification and medical management. Patient agreed with treatment plan. Follow up as directed.     Electronically signedby Sudhir Jones MD on 7/21/2020 at 1:53 PM

## 2020-08-05 ENCOUNTER — TELEPHONE (OUTPATIENT)
Dept: CARDIOLOGY CLINIC | Age: 70
End: 2020-08-05

## 2020-08-05 NOTE — TELEPHONE ENCOUNTER
Attempt to contact patient.   Recording states \" the person you have dialed is not able to receive calls at this time\"

## 2020-08-05 NOTE — LETTER
[address] [facility]  [place] [address] [place] [address]  Phone: [phone]  Fax: [phone]    Bill Lazcano MD        August 7, 2020      Mr. William Latter-day,    [address] office has been trying to contact you. Please give us a call ar [phone] and press option 3 at your earliest convenience.         Sincerely,        Bill Lazcano MD

## 2020-08-07 NOTE — TELEPHONE ENCOUNTER
Spoke to daughter Kathy Andersen. Notified patient needs VA okay to schedule in [facility]. Orders placed and given to scheduling.

## 2020-08-11 NOTE — TELEPHONE ENCOUNTER
Called [employer] patients daughter, and set Levi up in [address] [place] [address] for 8/25/20 to arrive at 9:15. Went over instructions and also mailed them to the patient.   LM with Interpretor Erinn Espinosa at [phone]

## 2020-08-25 ENCOUNTER — PREP FOR PROCEDURE (OUTPATIENT)
Dept: CARDIOLOGY | Age: 70
End: 2020-08-25

## 2020-08-25 RX ORDER — SODIUM CHLORIDE 9 MG/ML
INJECTION, SOLUTION INTRAVENOUS CONTINUOUS
Status: CANCELLED | OUTPATIENT
Start: 2020-08-25

## 2020-08-25 RX ORDER — SODIUM CHLORIDE 0.9 % (FLUSH) 0.9 %
10 SYRINGE (ML) INJECTION PRN
Status: CANCELLED | OUTPATIENT
Start: 2020-08-25

## 2020-08-25 RX ORDER — SODIUM CHLORIDE 0.9 % (FLUSH) 0.9 %
10 SYRINGE (ML) INJECTION EVERY 12 HOURS SCHEDULED
Status: CANCELLED | OUTPATIENT
Start: 2020-08-25

## 2020-08-28 ENCOUNTER — TELEPHONE (OUTPATIENT)
Dept: CARDIOLOGY CLINIC | Age: 70
End: 2020-08-28

## 2020-08-28 ENCOUNTER — OFFICE VISIT (OUTPATIENT)
Dept: CARDIOLOGY CLINIC | Age: 70
End: 2020-08-28
Payer: MEDICARE

## 2020-08-28 VITALS
WEIGHT: 168.6 LBS | BODY MASS INDEX: 24.97 KG/M2 | HEIGHT: 69 IN | SYSTOLIC BLOOD PRESSURE: 150 MMHG | HEART RATE: 72 BPM | DIASTOLIC BLOOD PRESSURE: 82 MMHG

## 2020-08-28 PROCEDURE — 3017F COLORECTAL CA SCREEN DOC REV: CPT | Performed by: INTERNAL MEDICINE

## 2020-08-28 PROCEDURE — G8420 CALC BMI NORM PARAMETERS: HCPCS | Performed by: INTERNAL MEDICINE

## 2020-08-28 PROCEDURE — G8427 DOCREV CUR MEDS BY ELIG CLIN: HCPCS | Performed by: INTERNAL MEDICINE

## 2020-08-28 PROCEDURE — 99215 OFFICE O/P EST HI 40 MIN: CPT | Performed by: INTERNAL MEDICINE

## 2020-08-28 PROCEDURE — 1036F TOBACCO NON-USER: CPT | Performed by: INTERNAL MEDICINE

## 2020-08-28 PROCEDURE — 4040F PNEUMOC VAC/ADMIN/RCVD: CPT | Performed by: INTERNAL MEDICINE

## 2020-08-28 PROCEDURE — 1123F ACP DISCUSS/DSCN MKR DOCD: CPT | Performed by: INTERNAL MEDICINE

## 2020-08-28 NOTE — PROGRESS NOTES
[address] [place] [address],[place] [address] [address] [place] [address]  Dept: [phone]  Dept Fax: [phone]  Loc: [phone]    Visit Date: 8/28/2020    Mr. Doug Gooden is a 79 y.o. male  who presented for:    HPI:   HPI   80 yo M with hx of deaf/mute, HTN, CAD who presents with sob, fatigue, and tiredness. No energy. Unable to get around for quite some time. 6-12 months. Mildly worsening LE edema, going to wound care due to \"leaking\" skin. EF 55% on last TTE. He has an BiV-ICD placed years ago. 2 years ago, had PCI of the LAD and RCA for recurrent chest pain refractory to NTG. No NTG SL use. Cannot walk far without getting dyspneic. Last year, he could do all his ADLS. He does all his bathing and toileting himself. Son lives with his parents and helps take care of his father. Patient drives a car. No major malignancies. No major teeth issues as he has dentures. He does drink alcohol - 7-8 per day, no drugs, no smoking.        Current Outpatient Medications:     bethanechol (URECHOLINE) 25 MG tablet, Take 25 mg by mouth 3 times daily, Disp: , Rfl:     irbesartan (AVAPRO) 300 MG tablet, TAKE 1 TABLET EVERY NIGHT, Disp: 90 tablet, Rfl: 0    clopidogrel (PLAVIX) 75 MG tablet, Take 1 tablet by mouth daily, Disp: 90 tablet, Rfl: 3    metoprolol succinate (TOPROL XL) 50 MG extended release tablet, TAKE 50 MG IN AM AND 25 IN PM. (Patient taking differently: TAKE 100 in am 50 in pm), Disp: 60 tablet, Rfl: 3    folic acid (FOLVITE) 1 MG tablet, Take 1 mg by mouth daily, Disp: , Rfl:     Multiple Vitamins-Minerals (THERAPEUTIC MULTIVITAMIN-MINERALS) tablet, Take 1 tablet by mouth daily, Disp: , Rfl:     potassium chloride (KLOR-CON M) 10 MEQ extended release tablet, Take 1 tablet by mouth daily, Disp: , Rfl:     tamsulosin (FLOMAX) 0.4 MG capsule, Take 1 capsule by mouth daily, Disp: 30 capsule, Rfl: 5    cetirizine (ZYRTEC) 10 MG tablet, Take 10 mg by mouth daily, Disp: , Rfl:     atorvastatin (LIPITOR) 80 MG tablet, Take 80 mg by mouth nightly, Disp: , Rfl:     nitroGLYCERIN (NITROSTAT) 0.4 MG SL tablet, PLACE 1 TABLET UNDER THE TONGUE EVERY 5 MINUTES AS NEEDED FOR CHEST PAIN UP TO MAX OF 3 TOTAL DOSES. IF NO RELIEF AFTER 1 DOSE, CALL 911., Disp: 25 tablet, Rfl: 3    aspirin 81 MG EC tablet, Take 81 mg by mouth daily. , Disp: , Rfl:     gabapentin (NEURONTIN) 100 MG capsule, Take 1 capsule by mouth 3 times daily for 360 days. , Disp: 270 capsule, Rfl: 3    Past Medical History  Dmitriy  has a past medical history of CHARISSA (acute kidney injury) ([facility] Arthritis, Blood circulation, collateral, CAD (coronary artery disease), Cardiomyopathy ([facility] [address] CHF with cardiomyopathy ([facility] [address] Chronic atrial fibrillation, Chronic kidney disease, Congenital heart disease, Deafness congenital, ETOH abuse, ETOH abuse, GERD (gastroesophageal reflux disease), Hyperlipidemia, Hypertension, Medical non-compliance, Medtronic BiV ICD implanted 9/18/14 [facility], and Medtronic BiV ICD-no atrial lead. Social History  Dmitriy  reports that he has never smoked. He has never used smokeless tobacco. He reports current alcohol use of about 16.0 standard drinks of alcohol per week. He reports that he does not use drugs. Family History  Dmitriy family history includes Arthritis in his father and mother; Diabetes in his mother; Heart Disease in his father; High Blood Pressure in his father; High Cholesterol in his father. There is no family history of bicuspid aortic valve, aneurysms, heart transplant, pacemakers, defibrillators, or sudden cardiac death.       Past Surgical History   Past Surgical History:   Procedure Laterality Date    CARDIAC DEFIBRILLATOR PLACEMENT  2005    PACEMAKER INSERTION  2005    NV FOOT/TOES SURGERY PROC UNLISTED Right 11/16/2018    RIGHT FOOT 1ST MTP JOINT ARTHRODESIS performed by Bobby Cruz MD at [address] [place] [address]  2011    PVP       Review of Systems   Constitutional: Negative for chills and fever  HENT: Negative for congestion, sinus pressure, sneezing and sore throat. Eyes: Negative for pain, discharge, redness and itching. Respiratory: Negative for apnea, cough  Gastrointestinal: Negative for blood in stool, constipation, diarrhea   Endocrine: Negative for cold intolerance, heat intolerance, polydipsia. Genitourinary: Negative for dysuria, enuresis, flank pain and hematuria. Musculoskeletal: Negative for arthralgias, joint swelling and neck pain. Neurological: Negative for numbness and headaches. Psychiatric/Behavioral: Negative for agitation, confusion, decreased concentration and dysphoric mood. Objective:     BP (!) 150/82   Pulse 72   Ht 5' 9\" (1.753 m)   Wt 168 lb 9.6 oz (76.5 kg)   BMI 24.90 kg/m²     Wt Readings from Last 3 Encounters:   08/28/20 168 lb 9.6 oz (76.5 kg)   07/02/19 169 lb 1.6 oz (76.7 kg)   06/24/19 156 lb 12.8 oz (71.1 kg)     BP Readings from Last 3 Encounters:   08/28/20 (!) 150/82   07/21/20 (!) 144/78   07/02/19 118/66       Nursing note and vitals reviewed. Physical Exam   Constitutional: Oriented to person, place, and time. Appears well-developed and well-nourished. HENT:   Head: Normocephalic and atraumatic. Eyes: EOM are normal. Pupils are equal, round, and reactive to light. Neck: Normal range of motion. Neck supple. No JVD present. Cardiovascular: Normal rate, regular rhythm, normal heart sounds and intact distal pulses. 3/6 HSM at the apex. Pulmonary/Chest: Effort normal and breath sounds normal. No respiratory distress. No wheezes. No rales. Abdominal: Soft. Bowel sounds are normal. No distension. There is no tenderness. Musculoskeletal: Normal range of motion. No edema. Neurological: Alert and oriented to person, place, and time. No cranial nerve deficit. Coordination normal. He is deaf and mute. Communicates via sign language. Skin: Skin is warm and dry.    Psychiatric: Normal mood and affect. No results found for: CKTOTAL, CKMB, CKMBINDEX    Lab Results   Component Value Date    WBC 4.7 02/06/2019    RBC 3.50 02/06/2019    HGB 11.6 02/06/2019    HCT 34.3 02/06/2019    MCV 98.0 02/06/2019    MCH 33.1 02/06/2019    MCHC 33.8 02/06/2019    RDW 14.9 11/14/2017     02/06/2019    MPV 9.9 02/06/2019       Lab Results   Component Value Date     06/24/2019    K 4.6 06/24/2019    CL 87 06/24/2019    CO2 18 06/24/2019    BUN 19 06/24/2019    LABALBU 4.6 05/03/2019    CREATININE 1.6 06/24/2019    CALCIUM 8.8 06/24/2019    LABGLOM 43 06/24/2019    GLUCOSE 82 06/24/2019       Lab Results   Component Value Date    ALKPHOS 109 05/03/2019    ALT 10 05/03/2019    AST 16 05/03/2019    PROT 6.9 02/04/2019    BILITOT 0.4 05/03/2019    BILIDIR <0.2 02/04/2019    LABALBU 4.6 05/03/2019       Lab Results   Component Value Date    MG 2.2 05/03/2019       Lab Results   Component Value Date    INR 0.97 11/13/2017    INR 1.00 03/04/2017         Lab Results   Component Value Date    LABA1C 4.4 03/04/2017       Lab Results   Component Value Date    TRIG 79 02/05/2019    HDL 40 02/05/2019    LDLCALC 31 02/05/2019       Lab Results   Component Value Date    TSH 3.35 05/03/2019         Testing Reviewed:      I have individually reviewed the cardiac test below:    ECHO:   Results for orders placed in visit on 08/05/20   ECHO Complete 2D W Doppler W Color        Assessment/Plan   Severe symptomatic mitral regurgitation, NYHA III  Chronic diastolic CHF  Preserved EF  Congenital deaf/muteness communicated via sign language  HTN  HLD  Hx of PCI of the LAD/RCA  Alcohol use      Given the STS scores, age, frailty, comorbidities, and the imaging findings, the patient is currently prohibitive risk for surgical MVR. Discussed transcatheter mitral valve repair (MitraClip) with the patient/family regarding risks, benefits, alternatives to the procedure.   The patient understands the associated visit with CT surgeon and also understands the need for review of the VA (possible repeat VA) and [facility] +/- PCI. The patient is FULL CODE. The POA is listed in the chart. We will schedule the above as appropriate. Once complete and appropriate based on the findings, a procedure date will be aligned at [facility], and we will notify the patient of further instructions. We had a long discussion with myself, family, patient, and structural heart coordinators. The family and patient were explained the risks/benefits/alternatives of the procedure, how the procedure works (need for general anesthesia, intubation, VA), the work-up, post-procedural care, and all of the possible complications of the procedure, including, but not limited to vascular injury, debilitating stroke, cardiac perforation, device related thrombosis, device embolization, bleeding, need for open heart surgery, and death. The patient/family would like to pursue MitraClip at our facility and we will work towards this goal.        The patient/family understand that should there be any findings or issues that arise during the evaluation that would preclude MitraClip, that this will need to be evaluated prior to proceeding with a percutaneous approach. Further, a unified heart team approach will be performed prior to proceeding with MitraClip which includes the patient's primary care givers, cardiologist, and the entire structural heart team (interventionalist, CT surgeons, structural heart NP). The patient and family appeared to understand everything, all of their questions were answered to their satisfaction and they are agreeable to proceed with further evaluation for MitraClip. Thank you for allowing us to participate in the care of this patient. Please do not hesitate to call us with questions. Disposition:  Based on the VA.           Electronically signed by Kitty Jackson MD   8/28/2020 at 1:31 PM EDT

## 2020-08-28 NOTE — TELEPHONE ENCOUNTER
Patient in to see Dr. Maradiaga Payment. CD that they brought he can't get to work. Please touch basis with Dr. Maradiaga Payment. Folder given to patient.

## 2020-08-28 NOTE — PROGRESS NOTES
Patient here for MR evaluation, Dr. Brigid Rossi patient. Patient doesn't talk or hear. Son at bedside.

## 2020-09-02 ENCOUNTER — PROCEDURE VISIT (OUTPATIENT)
Dept: CARDIOLOGY CLINIC | Age: 70
End: 2020-09-02
Payer: MEDICARE

## 2020-09-02 ENCOUNTER — TELEPHONE (OUTPATIENT)
Dept: CARDIOLOGY CLINIC | Age: 70
End: 2020-09-02

## 2020-09-02 PROCEDURE — 93296 REM INTERROG EVL PM/IDS: CPT | Performed by: NUCLEAR MEDICINE

## 2020-09-02 PROCEDURE — 93295 DEV INTERROG REMOTE 1/2/MLT: CPT | Performed by: NUCLEAR MEDICINE

## 2020-09-02 NOTE — PROGRESS NOTES
carelink medtronic biv icd     . Carter Bravo Battery longevity:  18 months   Presenting rhythm  biv paced     Atrial impedance none  RV impedance 456    Shock 79    R wave sensing 9.3    99.9 % RV paced     RV threshold 0.75 V at 0.4ms  LV 1 @ 0.4  optivol is off the chart -attempted to call son, line is busy    Call to daughter, [employer], states he is waiting on valve with Dr Octaviano Thomas he is strong enough for the procedure. Per [employer] he is not able to do much of anything. Seeing wound clinic for swelling/wrapping of bilateral legs.       Please advise

## 2020-09-02 NOTE — TELEPHONE ENCOUNTER
Sandro Adams RN at 9/2/2020  2:08 PM     Status: Signed       carelink medtronic biv icd      . .Battery longevity:  18 months   Presenting rhythm  biv paced      Atrial impedance none  RV impedance 456    Shock 79     R wave sensing 9.3     99.9 % RV paced      RV threshold 0.75 V at 0.4ms  LV 1 @ 0.4  optivol is off the chart -attempted to call son, line is busy     Call to daughter, Jaylin Reese, states he is waiting on valve with Dr Octaviano Thomas he is strong enough for the procedure. Per Jaylin Reese he is not able to do much of anything.   Seeing wound clinic for swelling/wrapping of bilateral legs.       Please advise

## 2020-09-08 ENCOUNTER — TELEPHONE (OUTPATIENT)
Dept: CARDIOLOGY CLINIC | Age: 70
End: 2020-09-08

## 2020-09-08 ENCOUNTER — OFFICE VISIT (OUTPATIENT)
Dept: CARDIOTHORACIC SURGERY | Age: 70
End: 2020-09-08
Payer: MEDICARE

## 2020-09-08 VITALS
HEIGHT: 69 IN | DIASTOLIC BLOOD PRESSURE: 82 MMHG | BODY MASS INDEX: 24.88 KG/M2 | SYSTOLIC BLOOD PRESSURE: 128 MMHG | WEIGHT: 168 LBS | TEMPERATURE: 97.5 F | HEART RATE: 86 BPM

## 2020-09-08 PROCEDURE — G8427 DOCREV CUR MEDS BY ELIG CLIN: HCPCS | Performed by: PHYSICIAN ASSISTANT

## 2020-09-08 PROCEDURE — 3017F COLORECTAL CA SCREEN DOC REV: CPT | Performed by: PHYSICIAN ASSISTANT

## 2020-09-08 PROCEDURE — G8420 CALC BMI NORM PARAMETERS: HCPCS | Performed by: PHYSICIAN ASSISTANT

## 2020-09-08 PROCEDURE — 99203 OFFICE O/P NEW LOW 30 MIN: CPT | Performed by: PHYSICIAN ASSISTANT

## 2020-09-08 PROCEDURE — 1036F TOBACCO NON-USER: CPT | Performed by: PHYSICIAN ASSISTANT

## 2020-09-08 PROCEDURE — 1123F ACP DISCUSS/DSCN MKR DOCD: CPT | Performed by: PHYSICIAN ASSISTANT

## 2020-09-08 PROCEDURE — 4040F PNEUMOC VAC/ADMIN/RCVD: CPT | Performed by: PHYSICIAN ASSISTANT

## 2020-09-08 NOTE — PROGRESS NOTES
CT surgery New Patient Office Appointment    9/8/2020 9:43 AM    Patient's Name/Date of Birth: Wlil Veliz / 1950 (79 y.o.)    PCP: Nelson Carey MD    Date: September 8, 2020     CC:   Mitral regurgitation    HPI  We had the pleasure of seeing Will Veliz in the office today, as you know this is a very pleasant 79y.o. year old male with a history of mitral regurgitation, CAD,CHF with cardiomyopathy, chronic atrial fibrillation, CKD, ETOH abuse, hypertension, hyperlipidemia, and GERD. The pt presents today for evaluation of mitral regurgitation. ECHO obtained 08/26/20 showed ef 55% and severe mitral regurgitation. The patient is complaining of [place], SOB, LE edema, and fatigue. PastMedical History:  Dmitriy  has a past medical history of CHARISSA (acute kidney injury) ([address] Arthritis, Blood circulation, collateral, CAD (coronary artery disease), Cardiomyopathy ([address] CHF with cardiomyopathy ([address] Chronic atrial fibrillation, Chronic kidney disease, Congenital heart disease, Deafness congenital, ETOH abuse, ETOH abuse, GERD (gastroesophageal reflux disease), Hyperlipidemia, Hypertension, Medical non-compliance, Medtronic BiV ICD implanted 9/18/14 [facility], and Medtronic BiV ICD-no atrial lead. Past Surgical History:  The patient  has a past surgical history that includes Pacemaker insertion (2005); Cardiac defibrillator placement (2005); Prostate surgery (2011); and pr foot/toes surgery proc unlisted (Right, 11/16/2018). Allergies: The patient has No Known Allergies.     Medications:    Current Outpatient Medications:     bethanechol (URECHOLINE) 25 MG tablet, Take 25 mg by mouth 3 times daily, Disp: , Rfl:     irbesartan (AVAPRO) 300 MG tablet, TAKE 1 TABLET EVERY NIGHT, Disp: 90 tablet, Rfl: 0    clopidogrel (PLAVIX) 75 MG tablet, Take 1 tablet by mouth daily, Disp: 90 tablet, Rfl: 3    metoprolol succinate (TOPROL XL) 50 MG extended release tablet, TAKE 50 MG IN AM AND 25 IN PM. (Patient taking differently: TAKE 100 in am 50 in pm), Disp: 60 tablet, Rfl: 3    folic acid (FOLVITE) 1 MG tablet, Take 1 mg by mouth daily, Disp: , Rfl:     Multiple Vitamins-Minerals (THERAPEUTIC MULTIVITAMIN-MINERALS) tablet, Take 1 tablet by mouth daily, Disp: , Rfl:     potassium chloride (KLOR-CON M) 10 MEQ extended release tablet, Take 1 tablet by mouth daily, Disp: , Rfl:     tamsulosin (FLOMAX) 0.4 MG capsule, Take 1 capsule by mouth daily, Disp: 30 capsule, Rfl: 5    cetirizine (ZYRTEC) 10 MG tablet, Take 10 mg by mouth daily, Disp: , Rfl:     atorvastatin (LIPITOR) 80 MG tablet, Take 80 mg by mouth nightly, Disp: , Rfl:     gabapentin (NEURONTIN) 100 MG capsule, Take 1 capsule by mouth 3 times daily for 360 days. , Disp: 270 capsule, Rfl: 3    nitroGLYCERIN (NITROSTAT) 0.4 MG SL tablet, PLACE 1 TABLET UNDER THE TONGUE EVERY 5 MINUTES AS NEEDED FOR CHEST PAIN UP TO MAX OF 3 TOTAL DOSES. IF NO RELIEF AFTER 1 DOSE, CALL 911., Disp: 25 tablet, Rfl: 3    aspirin 81 MG EC tablet, Take 81 mg by mouth daily. , Disp: , Rfl:     Family History: This patient's family history includes Arthritis in his father and mother; Diabetes in his mother; Heart Disease in his father; High Blood Pressure in his father; High Cholesterol in his father. Social History:  Verlious  reports that he has never smoked. He has never used smokeless tobacco. He reports current alcohol use of about 16.0 standard drinks of alcohol per week. He reports that he does not use drugs. Imaging:  ECHO: I have reviewed the ECHO images. Vitals:    09/08/20 1112   BP: 128/82   Site: Right Upper Arm   Position: Sitting   Cuff Size: Medium Adult   Pulse: 86   Temp: 97.5 °F (36.4 °C)   Weight: 168 lb (76.2 kg)   Height: 5' 9\" (1.753 m)     ROS:   Constitutional: Negative for chills, fatigue, fever and unexpected weight change. Respiratory: Negative for apnea,  wheezing and stridor.     Cardiovascular: Negative for palpitations. Gastrointestinal: Negative for hematochezia, melana, constipation, and N/V/D. Musculoskeletal: Negative for myalgias  Skin: Negative for color change, rash and wound. Neurological: Negative for dizziness or syncope. Physical Exam:   General Appearance: alert ,conversing, in no acute distress  Cardiovascular: normal rate, regular rhythm, normal S1 and S2, with 3/6 HSM at the apex. Pulmonary/Chest: clear to auscultation bilaterally- no wheezes, rales or rhonchi, normal air movement, no respiratory distress  Abdomen:soft, non-tender, non-distended, normal bowel sounds, no bruits,   Extremities: no cyanosis, clubbing or edema. Skin: warm and dry, no rash or erythema  Head: normocephalic and atraumatic  Neck: supple and non-tender without mass, no thyromegaly, no JVD   Musculoskeletal: normal range of motion, no joint swelling, deformity or tenderness. Neurological: alert, oriented, normal speech, no focal findings or movement disorder noted. Active Problem List:  Patient Active Problem List   Diagnosis    Cardiomyopathy ([address]    ETOH abuse    Deafness congenital    Medical non-compliance    Medtronic BiV ICD-no atrial lead    Ventricular fibrillation (HCC)    Hyponatremia    Inappropriate shocks from ICD (implantable cardioverter-defibrillator)    ICD (implantable cardioverter-defibrillator) discharge    Metabolic acidosis    CHARISSA (acute kidney injury) ([address]    CKD (chronic kidney disease)    Chronic cough    Essential hypertension    Moderate mitral regurgitation    Angina effort    Ischemic cardiomyopathy    CHF with cardiomyopathy ([address]    CRF (chronic renal failure), stage 3 (moderate) ([address]    Severe malnutrition ([address]    Acute on chronic systolic heart failure (HCC)       Assessment:   Mitral Regurgitation    Plan: 9/8/20  3 79year-old male with severe symptomatic mitral regurgitation.   Patient clearly is at high risk for surgical mitral valve replacement, for reasons of STS score, CHF, frailty, and ETOH abuse. Recommend proceed with evaluation for MitraClip.     The plan of care was discussed in detail with Dr. Chano Parekh

## 2020-09-11 ENCOUNTER — PREP FOR PROCEDURE (OUTPATIENT)
Dept: CARDIOLOGY | Age: 70
End: 2020-09-11

## 2020-09-11 RX ORDER — SODIUM CHLORIDE 9 MG/ML
INJECTION, SOLUTION INTRAVENOUS CONTINUOUS
Status: CANCELLED | OUTPATIENT
Start: 2020-09-11

## 2020-09-11 RX ORDER — SODIUM CHLORIDE 0.9 % (FLUSH) 0.9 %
10 SYRINGE (ML) INJECTION EVERY 12 HOURS SCHEDULED
Status: CANCELLED | OUTPATIENT
Start: 2020-09-11

## 2020-09-11 RX ORDER — NITROGLYCERIN 0.4 MG/1
0.4 TABLET SUBLINGUAL EVERY 5 MIN PRN
Status: CANCELLED | OUTPATIENT
Start: 2020-09-11

## 2020-09-11 RX ORDER — SODIUM CHLORIDE 0.9 % (FLUSH) 0.9 %
10 SYRINGE (ML) INJECTION PRN
Status: CANCELLED | OUTPATIENT
Start: 2020-09-11

## 2020-09-11 RX ORDER — ASPIRIN 325 MG
325 TABLET ORAL ONCE
Status: CANCELLED | OUTPATIENT
Start: 2020-09-11 | End: 2020-09-11

## 2020-09-14 ENCOUNTER — HOSPITAL ENCOUNTER (OUTPATIENT)
Dept: INPATIENT UNIT | Age: 70
Discharge: HOME | End: 2020-09-14
Attending: NUCLEAR MEDICINE | Admitting: NUCLEAR MEDICINE
Payer: MEDICARE

## 2020-09-14 VITALS
RESPIRATION RATE: 11 BRPM | SYSTOLIC BLOOD PRESSURE: 115 MMHG | DIASTOLIC BLOOD PRESSURE: 63 MMHG | TEMPERATURE: 97.1 F | HEART RATE: 70 BPM | BODY MASS INDEX: 27.28 KG/M2 | HEIGHT: 68 IN | WEIGHT: 180 LBS | OXYGEN SATURATION: 100 %

## 2020-09-14 LAB
ABO CHECK: NORMAL
ANION GAP SERPL CALCULATED.3IONS-SCNC: 11 MEQ/L (ref 8–16)
ANION GAP SERPL CALCULATED.3IONS-SCNC: 12 MEQ/L (ref 8–16)
APTT: 29.7 SECONDS (ref 22–38)
BUN BLDV-MCNC: 13 MG/DL (ref 7–22)
BUN BLDV-MCNC: 14 MG/DL (ref 7–22)
CALCIUM SERPL-MCNC: 8.8 MG/DL (ref 8.5–10.5)
CALCIUM SERPL-MCNC: 9.2 MG/DL (ref 8.5–10.5)
CHLORIDE BLD-SCNC: 90 MEQ/L (ref 98–111)
CHLORIDE BLD-SCNC: 95 MEQ/L (ref 98–111)
CO2: 19 MEQ/L (ref 23–33)
CO2: 20 MEQ/L (ref 23–33)
COLLECTED BY:: ABNORMAL
COLLECTED BY:: ABNORMAL
COLLECTED BY:: NORMAL
CREAT SERPL-MCNC: 1.3 MG/DL (ref 0.4–1.2)
CREAT SERPL-MCNC: 1.5 MG/DL (ref 0.4–1.2)
EKG ATRIAL RATE: 77 BPM
EKG Q-T INTERVAL: 466 MS
EKG QRS DURATION: 170 MS
EKG QTC CALCULATION (BAZETT): 524 MS
EKG R AXIS: -30 DEGREES
EKG T AXIS: 23 DEGREES
EKG VENTRICULAR RATE: 76 BPM
ERYTHROCYTE [DISTWIDTH] IN BLOOD BY AUTOMATED COUNT: 13.9 % (ref 11.5–14.5)
ERYTHROCYTE [DISTWIDTH] IN BLOOD BY AUTOMATED COUNT: 53.5 FL (ref 35–45)
GEL INDIRECT COOMBS: NORMAL
GFR SERPL CREATININE-BSD FRML MDRD: 46 ML/MIN/1.73M2
GFR SERPL CREATININE-BSD FRML MDRD: 55 ML/MIN/1.73M2
GLUCOSE BLD-MCNC: 83 MG/DL (ref 70–108)
GLUCOSE BLD-MCNC: 89 MG/DL (ref 70–108)
HCT VFR BLD CALC: 38.3 % (ref 42–52)
HEMOGLOBIN: 12.8 GM/DL (ref 14–18)
INR BLD: 1.03 (ref 0.85–1.13)
MCH RBC QN AUTO: 34.7 PG (ref 26–33)
MCHC RBC AUTO-ENTMCNC: 33.4 GM/DL (ref 32.2–35.5)
MCV RBC AUTO: 103.8 FL (ref 80–94)
PLATELET # BLD: 182 THOU/MM3 (ref 130–400)
PMV BLD AUTO: 9.5 FL (ref 9.4–12.4)
POC O2 SATURATION: 68 % (ref 94–97)
POC O2 SATURATION: 69 % (ref 94–97)
POC O2 SATURATION: 95 % (ref 94–97)
POTASSIUM REFLEX MAGNESIUM: 5.3 MEQ/L (ref 3.5–5.2)
POTASSIUM SERPL-SCNC: 5.4 MEQ/L (ref 3.5–5.2)
RBC # BLD: 3.69 MILL/MM3 (ref 4.7–6.1)
RH FACTOR: NORMAL
SODIUM BLD-SCNC: 122 MEQ/L (ref 135–145)
SODIUM BLD-SCNC: 125 MEQ/L (ref 135–145)
SOURCE, BLOOD GAS: ABNORMAL
SOURCE, BLOOD GAS: ABNORMAL
SOURCE, BLOOD GAS: NORMAL
WBC # BLD: 6.1 THOU/MM3 (ref 4.8–10.8)

## 2020-09-14 PROCEDURE — 86885 COOMBS TEST INDIRECT QUAL: CPT

## 2020-09-14 PROCEDURE — 96360 HYDRATION IV INFUSION INIT: CPT

## 2020-09-14 PROCEDURE — 86901 BLOOD TYPING SEROLOGIC RH(D): CPT

## 2020-09-14 PROCEDURE — 86900 BLOOD TYPING SEROLOGIC ABO: CPT

## 2020-09-14 PROCEDURE — C1769 GUIDE WIRE: HCPCS

## 2020-09-14 PROCEDURE — 85730 THROMBOPLASTIN TIME PARTIAL: CPT

## 2020-09-14 PROCEDURE — 93460 R&L HRT ART/VENTRICLE ANGIO: CPT | Performed by: NUCLEAR MEDICINE

## 2020-09-14 PROCEDURE — 36415 COLL VENOUS BLD VENIPUNCTURE: CPT

## 2020-09-14 PROCEDURE — 6360000002 HC RX W HCPCS

## 2020-09-14 PROCEDURE — C1887 CATHETER, GUIDING: HCPCS

## 2020-09-14 PROCEDURE — 2580000003 HC RX 258: Performed by: PHYSICIAN ASSISTANT

## 2020-09-14 PROCEDURE — 2709999900 HC NON-CHARGEABLE SUPPLY

## 2020-09-14 PROCEDURE — 93010 ELECTROCARDIOGRAM REPORT: CPT | Performed by: NUCLEAR MEDICINE

## 2020-09-14 PROCEDURE — 85610 PROTHROMBIN TIME: CPT

## 2020-09-14 PROCEDURE — 2500000003 HC RX 250 WO HCPCS

## 2020-09-14 PROCEDURE — 82810 BLOOD GASES O2 SAT ONLY: CPT

## 2020-09-14 PROCEDURE — 93005 ELECTROCARDIOGRAM TRACING: CPT | Performed by: PHYSICIAN ASSISTANT

## 2020-09-14 PROCEDURE — 80048 BASIC METABOLIC PNL TOTAL CA: CPT

## 2020-09-14 PROCEDURE — 96361 HYDRATE IV INFUSION ADD-ON: CPT

## 2020-09-14 PROCEDURE — C1894 INTRO/SHEATH, NON-LASER: HCPCS

## 2020-09-14 PROCEDURE — 85027 COMPLETE CBC AUTOMATED: CPT

## 2020-09-14 PROCEDURE — 6360000004 HC RX CONTRAST MEDICATION: Performed by: NUCLEAR MEDICINE

## 2020-09-14 RX ORDER — SODIUM CHLORIDE 9 MG/ML
INJECTION, SOLUTION INTRAVENOUS CONTINUOUS
Status: DISCONTINUED | OUTPATIENT
Start: 2020-09-14 | End: 2020-09-14 | Stop reason: HOSPADM

## 2020-09-14 RX ORDER — SODIUM CHLORIDE 9 MG/ML
INJECTION, SOLUTION INTRAVENOUS CONTINUOUS
Status: DISCONTINUED | OUTPATIENT
Start: 2020-09-14 | End: 2020-09-14 | Stop reason: SDUPTHER

## 2020-09-14 RX ORDER — SODIUM CHLORIDE 0.9 % (FLUSH) 0.9 %
10 SYRINGE (ML) INJECTION EVERY 12 HOURS SCHEDULED
Status: DISCONTINUED | OUTPATIENT
Start: 2020-09-14 | End: 2020-09-14 | Stop reason: SDUPTHER

## 2020-09-14 RX ORDER — ASPIRIN 325 MG
325 TABLET ORAL ONCE
Status: DISCONTINUED | OUTPATIENT
Start: 2020-09-14 | End: 2020-09-14 | Stop reason: HOSPADM

## 2020-09-14 RX ORDER — ACETAMINOPHEN 325 MG/1
650 TABLET ORAL EVERY 4 HOURS PRN
Status: DISCONTINUED | OUTPATIENT
Start: 2020-09-14 | End: 2020-09-14 | Stop reason: HOSPADM

## 2020-09-14 RX ORDER — ATROPINE SULFATE 0.4 MG/ML
0.5 AMPUL (ML) INJECTION
Status: DISCONTINUED | OUTPATIENT
Start: 2020-09-14 | End: 2020-09-14 | Stop reason: HOSPADM

## 2020-09-14 RX ORDER — SODIUM CHLORIDE 0.9 % (FLUSH) 0.9 %
10 SYRINGE (ML) INJECTION EVERY 12 HOURS SCHEDULED
Status: DISCONTINUED | OUTPATIENT
Start: 2020-09-14 | End: 2020-09-14 | Stop reason: HOSPADM

## 2020-09-14 RX ORDER — NITROGLYCERIN 0.4 MG/1
0.4 TABLET SUBLINGUAL EVERY 5 MIN PRN
Status: DISCONTINUED | OUTPATIENT
Start: 2020-09-14 | End: 2020-09-14 | Stop reason: HOSPADM

## 2020-09-14 RX ORDER — SODIUM CHLORIDE 0.9 % (FLUSH) 0.9 %
10 SYRINGE (ML) INJECTION PRN
Status: DISCONTINUED | OUTPATIENT
Start: 2020-09-14 | End: 2020-09-14 | Stop reason: SDUPTHER

## 2020-09-14 RX ORDER — SODIUM CHLORIDE 0.9 % (FLUSH) 0.9 %
10 SYRINGE (ML) INJECTION PRN
Status: DISCONTINUED | OUTPATIENT
Start: 2020-09-14 | End: 2020-09-14 | Stop reason: HOSPADM

## 2020-09-14 RX ADMIN — IOPAMIDOL 50 ML: 755 INJECTION, SOLUTION INTRAVENOUS at 14:55

## 2020-09-14 RX ADMIN — SODIUM CHLORIDE: 9 INJECTION, SOLUTION INTRAVENOUS at 07:48

## 2020-09-14 NOTE — H&P
[address] [place] [address]  Sedation/Analgesia History & Physical    Pt Name: Will Veliz  Account number: [de-identified]  MRN: [id number]  YOB: 1950  Provider Performing Procedure: Kennedi Campa MD [facility]  Primary Care Physician: Nelson Carey MD  Date: 9/14/2020    PRE-PROCEDURE    Code Status: FULL CODE  Brief History/Pre-Procedure Diagnosis:   MR     Consent: : I have discussed with the patient risks, benefits, and alternatives (and relevant risks, benefits, and side effects related to alternatives or not receiving care), and likelihood of the success. The patient and/or representative appear to understand and agree to proceed. The discussion encompasses risks, benefits, and side effects related to the alternatives and the risks related to not receiving the proposed care, treatment, and services. MEDICAL HISTORY  [x]ASHD/ANGINA/MI/CHF   [x]Hypertension  []Diabetes  []Hyperlipidemia  []Smoking  []Family Hx of ASHD  []Additional information:       has a past medical history of CHARISSA (acute kidney injury) ([facility] [address] Arthritis, Blood circulation, collateral, CAD (coronary artery disease), Cardiomyopathy ([facility] [address] CHF with cardiomyopathy ([facility] [address] Chronic atrial fibrillation, Chronic kidney disease, Congenital heart disease, Deafness congenital, ETOH abuse, ETOH abuse, GERD (gastroesophageal reflux disease), Hyperlipidemia, Hypertension, Medical non-compliance, Medtronic BiV ICD implanted 9/18/14 [facility], and Medtronic BiV ICD-no atrial lead. SURGICAL HISTORY   has a past surgical history that includes Pacemaker insertion (2005); Cardiac defibrillator placement (2005); Prostate surgery (2011); and pr foot/toes surgery proc unlisted (Right, 11/16/2018).   Additional information:       ALLERGIES   Allergies as of 09/14/2020    (No Known Allergies)     Additional information:       MEDICATIONS   Aspirin  [x] 81 mg  [] 325 mg  [] None  Antiplatelet drug therapy use last 5 days  []No []Yes  Coumadin Use Last 5 Days []No []Yes  Other anticoagulant use last 5 days  []No []Yes    Current Facility-Administered Medications:     0.9 % sodium chloride infusion, , Intravenous, Continuous, Jose Ramon Amin MD, Last Rate: 100 mL/hr at 09/14/20 0953    aspirin tablet 325 mg, 325 mg, Oral, Once, SHAKIR Roth-MACRINA    nitroGLYCERIN (NITROSTAT) SL tablet 0.4 mg, 0.4 mg, Sublingual, Q5 Min PRN, SHAKIR Roth-MACRINA    sodium chloride flush 0.9 % injection 10 mL, 10 mL, Intravenous, 2 times per day, Fredna Budsafia PA-C    sodium chloride flush 0.9 % injection 10 mL, 10 mL, Intravenous, PRN, Sherman Rangel PA-C  Prior to Admission medications    Medication Sig Start Date End Date Taking? Authorizing Provider   bethanechol (URECHOLINE) 25 MG tablet Take 25 mg by mouth 3 times daily   Yes Historical Provider, MD   irbesartan (AVAPRO) 300 MG tablet TAKE 1 TABLET EVERY NIGHT 6/22/20  Yes Kellee Horne MD   clopidogrel (PLAVIX) 75 MG tablet Take 1 tablet by mouth daily 9/10/19  Yes Kellee Horne MD   metoprolol succinate (TOPROL XL) 50 MG extended release tablet TAKE 50 MG IN AM AND 25 IN PM. 7/25/19  Yes Kellee Horne MD   folic acid (FOLVITE) 1 MG tablet Take 1 mg by mouth daily   Yes Historical Provider, MD   Multiple Vitamins-Minerals (THERAPEUTIC MULTIVITAMIN-MINERALS) tablet Take 1 tablet by mouth daily   Yes Historical Provider, MD   potassium chloride (KLOR-CON M) 10 MEQ extended release tablet Take 1 tablet by mouth daily 6/21/19  Yes Historical Provider, MD   tamsulosin (FLOMAX) 0.4 MG capsule Take 1 capsule by mouth daily 6/24/19  Yes Le Cadena APRN - CNP   cetirizine (ZYRTEC) 10 MG tablet Take 10 mg by mouth daily   Yes Historical Provider, MD   atorvastatin (LIPITOR) 80 MG tablet Take 80 mg by mouth nightly   Yes Historical Provider, MD   gabapentin (NEURONTIN) 100 MG capsule Take 1 capsule by mouth 3 times daily for 360 days.  3/28/19 9/14/20 Yes Kellee Horne MD immediately prior to the procedure.  (Refer to nursing sedation/analgesia documentation record)    Parris Stevenson MD [facility]  Electronically signed 9/14/2020 at 1:30 PM

## 2020-09-14 NOTE — PROCEDURES
[address] [place] [address]                            CARDIAC CATHETERIZATION    PATIENT NAME: Dwayne Uriarte                  :        1950  MED REC NO:   [id number]                           ROOM:       0004  ACCOUNT NO:   [de-identified]                           ADMIT DATE: 2020  PROVIDER:     SCARLETT Bar PROCEDURE:  2020    CLINICAL HISTORY AND INDICATION:  72-year-old gentleman with mitral  regurgitation. Right and left heart cath was ordered before valve  intervention. PROCEDURES:  1. Left heart cath with LV-gram.  2.  Right heart cath. 3.  Coronary angiogram.  4.  Sedation; 1 of Versed, 50 of fentanyl between 02:00 p.m. and 02:30  p.m. in my presence under my supervision. Blood loss less than 10 cc     PROCEDURE DETAILS:  Please refer to my catheterization detailed note. HEMODYNAMICS RESULTS AND LEFT VENTRICULOGRAM:  Left ventricular  end-diastolic pressure was 20 mmHg with no significant change before and  after contrast injection. The LV function was abnormal.  EF 35% to 40%  with global hypokinesis with mitral regurgitation noted. RIGHT HEART PRESSURES:  Right heart pressures revealed borderline  elevation in pulmonary pressure of 35 mmHg systolic. No evidence of  intracardiac shunting. CORONARY ARTERIOGRAM RESULTS:  1. Left main is patent, gives rise to left anterior descending and left  circumflex artery. 2.  Left anterior descending artery has a stent which is patent with  luminal irregularity with the diagonal artery with 50% stenosis. 3.  Left circumflex artery is relatively small, nondominant, has diffuse  nonobstructive mild disease. 4.  Right coronary artery is dominant, has a stent which is patent with  mild nonobstructive disease. CONCLUSION:  1. Nonobstructive coronary artery disease. 2.  LV dysfunction with mitral regurgitation.   3.  Borderline pulmonary hypertension. 4.  No complication. RECOMMENDATIONS:  At this point, the patient is to follow with Dr. Dane Potter  on further evaluation for mitral clipping.         Astrid Hearn M.D.    D: 09/14/2020 17:20:32       T: 09/14/2020 17:24:36     [email or web address]  Job#: [id number]     Doc#: [id number]    CC:

## 2020-09-14 NOTE — PROGRESS NOTES
Discharge instructions given to patient's daughter, who signed the instructions to the patient, verbal understanding received by daughter.

## 2020-09-14 NOTE — PROGRESS NOTES
Pt admitted to  2E04 ambulatory for prehydration pre cardiac cath. Pt NPO. Patient accompanied by family members. Vital signs obtained. Assessment and data collection initiated. Oriented to room. Policies and procedures for 2E explained   All questions answered with no further questions at this time. Fall prevention and safety precautions discussed with patient.

## 2020-09-14 NOTE — PLAN OF CARE
Problem: Discharge Planning:  Goal: Participates in care planning  Description: Participates in care planning  Outcome: Ongoing   Care plan reviewed with patient and family members. Patient and family verbalize understanding of the plan of care and contribute to goal setting.

## 2020-09-21 ENCOUNTER — OFFICE VISIT (OUTPATIENT)
Dept: CARDIOLOGY CLINIC | Age: 70
End: 2020-09-21
Payer: MEDICARE

## 2020-09-21 VITALS
HEIGHT: 70 IN | SYSTOLIC BLOOD PRESSURE: 126 MMHG | HEART RATE: 83 BPM | OXYGEN SATURATION: 99 % | BODY MASS INDEX: 25.05 KG/M2 | DIASTOLIC BLOOD PRESSURE: 82 MMHG | WEIGHT: 175 LBS

## 2020-09-21 PROCEDURE — G8417 CALC BMI ABV UP PARAM F/U: HCPCS | Performed by: NURSE PRACTITIONER

## 2020-09-21 PROCEDURE — 1123F ACP DISCUSS/DSCN MKR DOCD: CPT | Performed by: NURSE PRACTITIONER

## 2020-09-21 PROCEDURE — G8427 DOCREV CUR MEDS BY ELIG CLIN: HCPCS | Performed by: NURSE PRACTITIONER

## 2020-09-21 PROCEDURE — 99214 OFFICE O/P EST MOD 30 MIN: CPT | Performed by: NURSE PRACTITIONER

## 2020-09-21 PROCEDURE — 4040F PNEUMOC VAC/ADMIN/RCVD: CPT | Performed by: NURSE PRACTITIONER

## 2020-09-21 PROCEDURE — 3017F COLORECTAL CA SCREEN DOC REV: CPT | Performed by: NURSE PRACTITIONER

## 2020-09-21 PROCEDURE — 1036F TOBACCO NON-USER: CPT | Performed by: NURSE PRACTITIONER

## 2020-09-21 RX ORDER — BUMETANIDE 2 MG/1
2 TABLET ORAL DAILY
Qty: 30 TABLET | Refills: 3 | Status: SHIPPED | OUTPATIENT
Start: 2020-09-21 | End: 2020-11-10 | Stop reason: SDUPTHER

## 2020-09-21 RX ORDER — SODIUM CHLORIDE 1000 MG
1 TABLET, SOLUBLE MISCELLANEOUS 3 TIMES DAILY
Qty: 90 TABLET | Refills: 3 | Status: SHIPPED | OUTPATIENT
Start: 2020-09-21 | End: 2020-11-18

## 2020-09-21 ASSESSMENT — ENCOUNTER SYMPTOMS
ABDOMINAL PAIN: 0
NAUSEA: 0
COUGH: 0
COLOR CHANGE: 0
CHEST TIGHTNESS: 0
ABDOMINAL DISTENTION: 1
SHORTNESS OF BREATH: 1
WHEEZING: 0
APNEA: 0

## 2020-09-21 NOTE — PROGRESS NOTES
[facility]       Visit Date: 9/21/2020  Cardiologist:  Dr. Janet Genao / [facility]   Primary Care Physician: Dr. Blanquita Umaña MD    Robert Estrada is a 79 y.o. male who presents today for:  Chief Complaint   Patient presents with    Congestive Heart Failure     Chrystal clip        HPI: Obtained from patient and chart    Robert Estrada is a 79 y.o. male who presents to the office for a follow up visit in the heart failure clinic. Hx A fib, HTN, CAD PCI stents, HFpEF 50-55% now 35-40% via LHC, CKD, ETOH. He is here with his son, pt is deaf mute but does not uses American sigh language which makes it difficult to communicate. He is following with Nephology for CKD and hyponatremia. He is still drinking alcohol 8-10 beers a day. His son thinks that is ok since he used to drink 15 beers a day. He is being evaluated for MitraClip. Sleeps in a bed with the [facility] elevated with a wedge and with 2-3 pillows. He decided last year to not follow with us or with Nephrology. He had \"too many appt. \" He is no longer on Bumex or sodium chloride tabs, does not know who stopped this? He underwent a LHC/RHC PWP was 24 mmHg. He continues to drink at least 7 beers a day in addition to pop and juice. He has been told by numerous providers to stop drinking alcohol and to limit his fluid intake, he chooses to continue. Today he has tight swollen legs, abdominal distension and more SOB with activity.        Accompanied by: son   Last hospital admission related to Heart Failure:  Feb 2019  Chest Pain: no  Worsening SOB: yes  Worsening Orthopnea/PND: no  Edema: yes  Any extra diuretic use: no  Weight gain: no  Fatigue: yes  Abdominal bloating: yes  Appetite: good  SHAINA: never tested  Cough: occasional  Compliant checking home weight: no  Compliant checking blood pressure: no      Past Medical History:   Diagnosis Date    [facility]ISSA [facility]acute [facility]) ([facility]     Arthritis     general    Blood circulation, collateral  CAD (coronary artery disease)     [facility] 8/13[facility]2014    CHF with cardiomyopathy ([facility] 2/4/2019    Chronic atrial fibrillation 8/13/2014    Chronic kidney disease     Congenital heart disease     Deafness congenital 8/13/2014    ETOH abuse 8/13/2014    ETOH abuse     GERD (gastroesophageal reflux disease)     Hyperlipidemia     Hypertension     Medical non-compliance 8/13/2014    Medtronic BiV ICD implanted 9/18/14 [facility] 10/8/2014    Medtronic BiV ICD-no atrial lead 10/8/2014     Past Surgical History:   Procedure Laterality Date    CARDIAC DEFIBRILLATOR PLACEMENT  2005    PACEMAKER INSERTION  2005    IN FOOT/TOES SURGERY PROC UNLISTED Right 11/16/2018    RIGHT FOOT 1ST MTP JOINT ARTHRODESIS performed by Mike Vu MD at [address] [place] [address]     Family History   Problem Relation Age of Onset    Diabetes Mother     Arthritis Mother     Heart Disease Father     High Blood Pressure Father     High Cholesterol Father     Arthritis Father      Social History     Tobacco Use    Smoking status: Never Smoker    Smokeless tobacco: Never Used   Substance Use Topics    Alcohol use:  Yes     Alcohol/week: 8.0 standard drinks     Types: 8 Cans of beer per week     Comment: 8 cans daily     Current Outpatient Medications   Medication Sig Dispense Refill    bumetanide (BUMEX) 2 MG tablet Take 1 tablet by mouth daily 30 tablet 3    sodium chloride 1 g tablet Take 1 tablet by mouth 3 times daily 90 tablet 3    bethanechol (URECHOLINE) 25 MG tablet Take 25 mg by mouth 3 times daily      irbesartan (AVAPRO) 300 MG tablet TAKE 1 TABLET EVERY NIGHT 90 tablet 0    clopidogrel (PLAVIX) 75 MG tablet Take 1 tablet by mouth daily 90 tablet 3    metoprolol succinate (TOPROL XL) 50 MG extended release tablet TAKE 50 MG IN AM AND 25 IN PM. 60 tablet 3    folic acid (FOLVITE) 1 MG tablet Take 1 mg by mouth daily      Multiple Vitamins-Minerals (THERAPEUTIC MULTIVITAMIN-MINERALS) tablet Take 1 tablet by mouth daily      potassium chloride (KLOR-CON M) 10 MEQ extended release tablet Take 1 tablet by mouth daily      tamsulosin (FLOMAX) 0.4 MG capsule Take 1 capsule by mouth daily 30 capsule 5    cetirizine (ZYRTEC) 10 MG tablet Take 10 mg by mouth daily      atorvastatin (LIPITOR) 80 MG tablet Take 80 mg by mouth nightly      gabapentin (NEURONTIN) 100 MG capsule Take 1 capsule by mouth 3 times daily for 360 days. 270 capsule 3    nitroGLYCERIN (NITROSTAT) 0.4 MG SL tablet PLACE 1 TABLET UNDER THE TONGUE EVERY 5 MINUTES AS NEEDED FOR CHEST PAIN UP TO MAX OF 3 TOTAL DOSES. IF NO RELIEF AFTER 1 DOSE, CALL 911. 25 tablet 3    aspirin 81 MG EC tablet Take 81 mg by mouth daily. No current facility-administered medications for this visit. No Known Allergies    SUBJECTIVE:   Review of Systems   Constitutional: Positive for fatigue. Negative for activity change, appetite change and fever. HENT: Negative for congestion. Respiratory: Positive for shortness of breath. Negative for apnea, cough, chest tightness and wheezing. Cardiovascular: Positive for leg swelling. Negative for chest pain and palpitations. Gastrointestinal: Positive for abdominal distention. Negative for abdominal pain and nausea. Genitourinary: Negative for difficulty urinating and dysuria. Musculoskeletal: Negative for arthralgias and gait problem. Skin: Negative for color change. Neurological: Negative for dizziness, numbness and headaches. Psychiatric/Behavioral: Negative for agitation, confusion and sleep disturbance. The patient is not nervous/anxious. OBJECTIVE:   Today's Vitals:  /82   Pulse 83   Ht 5' 9.5\" (1.765 m)   Wt 175 lb (79.4 kg)   SpO2 99%   BMI 25.47 kg/m²     Physical Exam  Vitals signs reviewed. Constitutional:       Appearance: He is well-developed. HENT:      Head: Normocephalic and atraumatic.       Comments: Deaf/mute - he does not use American sign language son is \"interpreting\"   Eyes:      Pupils: Pupils are equal, round, and reactive to light. Neck:      Musculoskeletal: Normal range of motion. Vascular: No JVD. Cardiovascular:      Rate and Rhythm: Normal rate and regular rhythm. Heart sounds: Murmur present. Comments: ICD  Pulmonary:      Effort: Pulmonary effort is normal. No respiratory distress. Breath sounds: No rales. Abdominal:      General: There is distension. Palpations: Abdomen is soft. Tenderness: There is no abdominal tenderness. Musculoskeletal:         General: No tenderness. Right lower leg: Edema (2++) present. Left lower leg: Edema (2++) present. Skin:     General: Skin is warm and dry. Capillary Refill: Capillary refill takes less than 2 seconds. Neurological:      Mental Status: He is alert and oriented to person, place, and time. Psychiatric:         Mood and Affect: Mood normal.         Behavior: Behavior normal.         Wt Readings from Last 3 Encounters:   09/21/20 175 lb (79.4 kg)   09/14/20 180 lb (81.6 kg)   09/08/20 168 lb (76.2 kg)     BP Readings from Last 3 Encounters:   09/21/20 126/82   09/14/20 115/63   09/08/20 128/82     Pulse Readings from Last 3 Encounters:   09/21/20 83   09/14/20 70   09/08/20 86     Body mass index is 25.47 kg/m². ECHO:   8/25/20    LHC/RHC 9/14/20  HEMODYNAMICS RESULTS AND LEFT VENTRICULOGRAM:  Left ventricular  end-diastolic pressure was 20 mmHg with no significant change before and  after contrast injection. The LV function was abnormal.  EF 35% to 40%  with global hypokinesis with mitral regurgitation noted.     RIGHT HEART PRESSURES:  Right heart pressures revealed borderline  elevation in pulmonary pressure of 35 mmHg systolic. No evidence of  intracardiac shunting.     CORONARY ARTERIOGRAM RESULTS:  1. Left main is patent, gives rise to left anterior descending and left  circumflex artery.   2.  Left Medtronic BiV ICD-no atrial lead     5. Deafness congenital     6. Alcohol abuse         Plan:  · GDMT   · ACE/ARB/ARNi: Irbesartan 300 mg daily  · Beta Blocker: Toprol 50 mg daily  · Aldosterone antagonist: no  · Diuretic/Potassium: Add Bumex 2 mg daily, Potassium 10 mEq daily  · Hydralazine/Nitrate: no  · Other: add sodium chloride 1 gm tid, continue Plavix, Lipitor, ASA 81 mg  · Very difficult to discuss with pt as he is deaf mute and they do not use American sign language therefore an  is not an option. His EF has declined form 55% to 35-40% via [facility]. PWP was 24 mmHg, he was not on diuretics. He has s/s of fluid overload today, LE edema, bloating and SOB. He continues to drink 8-10 beers a day in addition to pop and juice. I again instructed him he needs to cut his alcohol  down as well as his fluid intake in general. He is not going to stop drinking. He was advised suly wrap his legs. He is going to be difficult to manage with his hyponatremia, ETOH and noncompliance. Will check a BMP in 1 week. BP WNL. · HF Zones reviewed. · Daily weights and record  · Fluid restriction of 2 Liters per day (64 oz)   · Limit sodium in diet to 2600-4944 mg/day  · Healthier food options were discussed   · Monitor BP daily 1 hour after meds are taken  · Increase activity as tolerated     Patient was instructed to call the [address] for changes in the following symptoms:    Weight gain of 3 pounds in 1 day or 5 pounds in 1 week   Increased shortness of breath   Shortness of breath while laying down   Cough   Chest pain   Swelling in feet, ankles or legs   Tenderness or bloating in the abdomen   Fatigue    Decreased appetite or feeling \"full\"   Nausea    Confusion      Return in about 1 month (around 10/21/2020). or sooner if needed     Patient given educational materials - see patient instructions. We discussed the importance of weighing oneself and recording daily.  We also discussed the importance of a low sodium diet, higher sodium foods to avoid and appropriate low sodium food choices. Discussed use, benefit, and side effects of prescribed medications. All patient questions answered. Patient verbalizes understanding of plan of care using teach back method, and is agreeable to the treatment plan. Greater then 45 minutes of time was spent reviewing the chart and educating the patient about HF, medications, diet, exercise, and discussing the plan of care. I personally spent more then 50% of the appt time face to face with the patient counseling/coordinating patient's care.       Electronicallysigned by SUSAN Bacon CNP on 9/21/2020 at 11:05 AM

## 2020-09-21 NOTE — PATIENT INSTRUCTIONS
You may receive a survey regarding the care you received during your visit. Your input is valuable to us. We encourage you to complete and return your survey. We hope you will choose us in the future for your healthcare needs. Continue:  · Take all medications as prescribed   · Daily weights and record - please try to weigh yourself upon awakening before eating or drinking   · Fluid restriction of 2 Liters per day (64 oz)  · Limit sodium in diet to around 7580-5292 mg/day  · Monitor BP - take BP 1 hour after meds  · Increase activity as tolerated     Call the Heart Failure Clinic for any of the following symptoms: [phone]   Weight gain of 3 pounds in 1 day or 5 pounds in 1 week   Increased shortness of breath   Shortness of breath while laying down   Cough   Chest pain   Swelling in feet, ankles or legs   Tenderness or bloating in the abdomen   Fatigue    Decreased appetite or feeling \"full\"    Nausea    Confusion     **PLEASE bring all medications in original bottles with you to your next appointment**    Educational websites:    http://www.Cebix.Zealify/. org (American Heart Association)    Promotiongulu.com. com (Entresto/Novartis)    Novinda.com (CardioMEMS)    Text LEARN to 6-065-Zokox-34 (4-256.968.3015)  -- to learn more about CardioMEMS   By enrolling in this education series you are giving permission to [employer] to contact you via SMS (text message). You can opt out of receiving this information by replying STOP at any time in the SMS correspondence that you  receive. To ensure your privacy, [employer] and its subcontractors will not sell or share your personal information  with any third parties except as required by law. [address] [facility], [address] [place] [address], Tel: 1 [phone] [email or web address]  Rx Only  Brief Summary: Prior to using these devices, please review the Instructions for Use for a complete listing of  indications, contraindications, warnings, precautions, potential adverse events and directions for use.  Indicates a trademark of the Zoe Majeste. Bluetooth and Bluetooth logo are registered trademarks of Ernesto Sebastian.  © 2020 [employer]. All Rights Reserved. 89781 [id number] v1.0  Item approved for [place] use only.

## 2020-09-28 LAB
BUN BLDV-MCNC: 21 MG/DL
CALCIUM SERPL-MCNC: 9.1 MG/DL
CHLORIDE BLD-SCNC: 90 MMOL/L
CO2: 23 MMOL/L
CREAT SERPL-MCNC: 1.6 MG/DL
GFR CALCULATED: 46
GLUCOSE BLD-MCNC: 89 MG/DL
POTASSIUM SERPL-SCNC: 4.6 MMOL/L
SODIUM BLD-SCNC: 126 MMOL/L

## 2020-09-29 ENCOUNTER — TELEPHONE (OUTPATIENT)
Dept: CARDIOLOGY CLINIC | Age: 70
End: 2020-09-29

## 2020-09-29 NOTE — TELEPHONE ENCOUNTER
Patient's son Iván Silverman returned call and confirmed patient is taking Bumex but did not  Sodium. Patient's son is going to call [facility] to see if they have prescription and if so, he will  RX for patient. Iván Silverman stated patient is doing better stating he is losing weight, swelling is down, BP has been running good, and he is wearing compression socks. Iván Silverman will call us back to notify us once he picks up Sodium.

## 2020-09-29 NOTE — TELEPHONE ENCOUNTER
BMP reviewed  Sodium is still low  Kidney function is at the higher end of his baseline   Please see if he is taking Bumex and the Sodium tabs as prescribed?     Also please get an update on his weights and swelling    Thanks

## 2020-09-29 NOTE — TELEPHONE ENCOUNTER
Unable to reach son Lilia Bonilla with daughter Yani Davidson   She will check with patient and son and call office back

## 2020-09-30 NOTE — TELEPHONE ENCOUNTER
I would like to check a BMP in 7-10 days after he starts it  Continue the same dose of Bumex for now

## 2020-10-06 ENCOUNTER — PROCEDURE VISIT (OUTPATIENT)
Dept: CARDIOLOGY CLINIC | Age: 70
End: 2020-10-06
Payer: MEDICARE

## 2020-10-06 PROCEDURE — G2066 INTER DEVC REMOTE 30D: HCPCS | Performed by: INTERNAL MEDICINE

## 2020-10-06 PROCEDURE — 93297 REM INTERROG DEV EVAL ICPMS: CPT | Performed by: INTERNAL MEDICINE

## 2020-10-07 LAB
BUN BLDV-MCNC: 20 MG/DL
CALCIUM SERPL-MCNC: 9.4 MG/DL
CHLORIDE BLD-SCNC: 94 MMOL/L
CO2: 23 MMOL/L
CREAT SERPL-MCNC: 1.7 MG/DL
GFR CALCULATED: 43
GLUCOSE BLD-MCNC: 134 MG/DL
POTASSIUM SERPL-SCNC: 4.4 MMOL/L
SODIUM BLD-SCNC: 130 MMOL/L

## 2020-10-08 ENCOUNTER — TELEPHONE (OUTPATIENT)
Dept: CARDIOTHORACIC SURGERY | Age: 70
End: 2020-10-08

## 2020-10-26 ENCOUNTER — TELEPHONE (OUTPATIENT)
Dept: CARDIOLOGY CLINIC | Age: 70
End: 2020-10-26

## 2020-10-26 NOTE — TELEPHONE ENCOUNTER
Spoke to Levi Davies regarding her father's condition. She states that her father is more swollen and SOB and questioned when he could get his Clip completed? Orders received from Dr. Navid Dawkins to have the patient see [facility] and schedule the MitraClip. No need to bring the patient in for pre-hydration due to no dye used. [facility].      CHF appointment on 10/27/2020 at [facility] test ordered for 10/27/2020  Clip scheduled on 11/3/2020 at 0730

## 2020-10-27 ENCOUNTER — OFFICE VISIT (OUTPATIENT)
Dept: CARDIOLOGY CLINIC | Age: 70
End: 2020-10-27
Payer: MEDICARE

## 2020-10-27 ENCOUNTER — HOSPITAL ENCOUNTER (OUTPATIENT)
Age: 70
Setting detail: SPECIMEN
Discharge: HOME | End: 2020-10-27
Payer: MEDICARE

## 2020-10-27 VITALS
HEIGHT: 67 IN | BODY MASS INDEX: 25.62 KG/M2 | HEART RATE: 77 BPM | DIASTOLIC BLOOD PRESSURE: 78 MMHG | WEIGHT: 163.2 LBS | SYSTOLIC BLOOD PRESSURE: 128 MMHG | OXYGEN SATURATION: 98 %

## 2020-10-27 LAB
ANION GAP SERPL CALCULATED.3IONS-SCNC: 15 MEQ/L (ref 8–16)
BUN BLDV-MCNC: 16 MG/DL (ref 7–22)
CALCIUM SERPL-MCNC: 9.5 MG/DL (ref 8.5–10.5)
CHLORIDE BLD-SCNC: 94 MEQ/L (ref 98–111)
CO2: 21 MEQ/L (ref 23–33)
CREAT SERPL-MCNC: 1.5 MG/DL (ref 0.4–1.2)
GFR SERPL CREATININE-BSD FRML MDRD: 46 ML/MIN/1.73M2
GLUCOSE BLD-MCNC: 89 MG/DL (ref 70–108)
MAGNESIUM: 2 MG/DL (ref 1.6–2.4)
POTASSIUM SERPL-SCNC: 3.8 MEQ/L (ref 3.5–5.2)
PRO-BNP: 2972 PG/ML (ref 0–900)
SODIUM BLD-SCNC: 130 MEQ/L (ref 135–145)

## 2020-10-27 PROCEDURE — 4040F PNEUMOC VAC/ADMIN/RCVD: CPT | Performed by: NURSE PRACTITIONER

## 2020-10-27 PROCEDURE — 99214 OFFICE O/P EST MOD 30 MIN: CPT | Performed by: NURSE PRACTITIONER

## 2020-10-27 PROCEDURE — 1123F ACP DISCUSS/DSCN MKR DOCD: CPT | Performed by: NURSE PRACTITIONER

## 2020-10-27 PROCEDURE — G8427 DOCREV CUR MEDS BY ELIG CLIN: HCPCS | Performed by: NURSE PRACTITIONER

## 2020-10-27 PROCEDURE — G8484 FLU IMMUNIZE NO ADMIN: HCPCS | Performed by: NURSE PRACTITIONER

## 2020-10-27 PROCEDURE — G8417 CALC BMI ABV UP PARAM F/U: HCPCS | Performed by: NURSE PRACTITIONER

## 2020-10-27 PROCEDURE — 1036F TOBACCO NON-USER: CPT | Performed by: NURSE PRACTITIONER

## 2020-10-27 PROCEDURE — U0003 INFECTIOUS AGENT DETECTION BY NUCLEIC ACID (DNA OR RNA); SEVERE ACUTE RESPIRATORY SYNDROME CORONAVIRUS 2 (SARS-COV-2) (CORONAVIRUS DISEASE [COVID-19]), AMPLIFIED PROBE TECHNIQUE, MAKING USE OF HIGH THROUGHPUT TECHNOLOGIES AS DESCRIBED BY CMS-2020-01-R: HCPCS

## 2020-10-27 PROCEDURE — 3017F COLORECTAL CA SCREEN DOC REV: CPT | Performed by: NURSE PRACTITIONER

## 2020-10-27 RX ORDER — THIAMINE MONONITRATE (VIT B1) 100 MG
100 TABLET ORAL DAILY
COMMUNITY

## 2020-10-27 RX ORDER — GABAPENTIN 100 MG/1
100 CAPSULE ORAL 3 TIMES DAILY
Status: ON HOLD | COMMUNITY
End: 2020-11-06 | Stop reason: HOSPADM

## 2020-10-27 ASSESSMENT — ENCOUNTER SYMPTOMS
ABDOMINAL PAIN: 0
SHORTNESS OF BREATH: 0
APNEA: 0
NAUSEA: 0
CHEST TIGHTNESS: 0
COUGH: 0
ABDOMINAL DISTENTION: 0
WHEEZING: 0
COLOR CHANGE: 0

## 2020-10-27 NOTE — PATIENT INSTRUCTIONS
You may receive a survey regarding the care you received during your visit. Your input is valuable to us. We encourage you to complete and return your survey. We hope you will choose us in the future for your healthcare needs. Continue:  · Take all medications as prescribed   · Daily weights and record - please try to weigh yourself upon awakening before eating or drinking   · Fluid restriction of 2 Liters per day (64 oz)  · Limit sodium in diet to around 4698-0999 mg/day  · Monitor BP - take BP 1 hour after meds  · Increase activity as tolerated     Call the Heart Failure Clinic for any of the following symptoms: [phone]   Weight gain of 3 pounds in 1 day or 5 pounds in 1 week   Increased shortness of breath   Shortness of breath while laying down   Cough   Chest pain   Swelling in feet, ankles or legs   Tenderness or bloating in the abdomen   Fatigue    Decreased appetite or feeling \"full\"    Nausea    Confusion     **PLEASE bring all medications in original bottles with you to your next appointment**    Educational websites:    http://www.Green Revolution Cooling.Machine Perception Technologies/. org (American Heart Association)    PromotionAxcelis Technologies. com (Entresto/Novartis)    Headplay.com (CardioMEMS)    Text LEARN to 4-263-Hdmqr-34 ([phone])  -- to learn more about CardioMEMS   By enrolling in this education series you are giving permission to [employer] to contact you via SMS (text message). You can opt out of receiving this information by replying STOP at any time in the SMS correspondence that you  receive. To ensure your privacy, [employer] and its subcontractors will not sell or share your personal information  with any third parties except as required by law. [address] Gunjan Sharpe, [address] [place] [address], Tel: 1 [phone]  [email or web address]  Rx Only  Brief Summary: Prior to using these devices, please review the Instructions for Use for a complete listing of  indications, contraindications, warnings,

## 2020-10-27 NOTE — PROGRESS NOTES
[facility]       Visit Date: 10/27/2020  Cardiologist:  Dr. Sherine Pang / Yunier Farrell   Primary Care Physician: Dr. Nicolas Ibarra MD    Trang Guteirrez is a 79 y.o. male who presents today for:  Chief Complaint   Patient presents with    Congestive Heart Failure       HPI: Obtained from patient and chart    Trang Gutierrez is a 79 y.o. male who presents to the office for a follow up visit in the heart failure clinic. Hx A fib, HTN, CAD PCI stents, HFpEF 50-55% now 35-40% via LHC, CKD, ETOH, hyponatremia. He is here with his son, pt is deaf mute but does not uses American sign language which makes it difficult to communicate. He used to drink 15 beers a day, he has cut back was drinking 8 now 3 according to his son. He is being evaluated for MitraClip. Sleeps in a bed with the [facility] elevated with a wedge and with 2-3 pillows. He decided last year to not follow with us or with Nephrology. He had \"too many appt. \"  He underwent a LHC/RHC PWP was 24 mmHg. He had s/s of fluid overload last month. We adjusted meds and he looks good today. His daughter talked to our Structural Heart Coordinator and told her the patient needed to be seen sooner as his abdomen was distended, arms were swollen and legs were more swollen. He also was supposedly SOB. However upon exam today he looks the best I have seen him. His left hand is swollen, pt suffered a fall a few weeks ago when he tripped and fell. His LE look SO much better and he is wearing compression socks. He can perform ADLs without SOB according to the son. He does drink more fluid then he should in a day. He is making urine.  No cough.       Accompanied by: son  Last hospital admission related to Heart Failure:  Feb 2019  Chest Pain: no  Worsening SOB: improved  Worsening Orthopnea/PND: no  Edema: improved   Any extra diuretic use: no  Weight gain: no  Fatigue: improved  Abdominal bloating: improving  Appetite: good  SHAINA: never tested  Cough: no  Compliant checking home weight: yes 161 LBS  Compliant checking blood pressure: no      Past Medical History:   Diagnosis Date    [facility]ISSA [facility]) ([facility]     Arthritis     general    Blood circulation, collateral     CAD (coronary artery disease)     Cardiomyopathy ([facility] [facility] 8/13/2014    CHF with cardiomyopathy ([facility] [facility] 2/4/2019    Chronic atrial fibrillation ([facility] [address] 8/13/2014    Chronic kidney disease     Congenital heart disease     Deafness congenital 8/13/2014    ETOH abuse 8/13/2014    ETOH abuse     GERD (gastroesophageal reflux disease)     Hyperlipidemia     Hypertension     Medical non-compliance 8/13/2014    Medtronic BiV ICD implanted 9/18/14 [facility] 10/8/2014    Medtronic BiV ICD-no atrial lead 10/8/2014    Severe malnutrition ([facility] [address]      Past Surgical History:   Procedure Laterality Date    CARDIAC DEFIBRILLATOR PLACEMENT  2005    PACEMAKER INSERTION  2005    [place] FOOT/TOES SURGERY PROC UNLISTED Right 11/16/2018    RIGHT FOOT 1ST MTP JOINT ARTHRODESIS performed by Jayashree Lopez MD at [place] [address]  2011    PVP     Family History   Problem Relation Age of Onset    Diabetes Mother     Arthritis Mother     Heart Disease Father     High Blood Pressure Father     High Cholesterol Father     Arthritis Father      Social History     Tobacco Use    Smoking status: Never Smoker    Smokeless tobacco: Never Used   Substance Use Topics    Alcohol use: Yes     Alcohol/week: 8.0 standard drinks     Types: 8 Cans of beer per week     Comment: 8 cans daily     Current Outpatient Medications   Medication Sig Dispense Refill    vitamin B-1 (THIAMINE) 100 MG tablet Take 100 mg by mouth daily      gabapentin (NEURONTIN) 100 MG capsule Take 100 mg by mouth 3 times daily.       sodium chloride 1 g tablet Take 1 tablet by mouth 3 times daily 90 tablet 3    bethanechol (URECHOLINE) 25 MG tablet Take 25 mg by mouth 3 times daily      irbesartan (AVAPRO) 300 MG tablet TAKE 1 TABLET EVERY NIGHT 90 tablet 0    clopidogrel (PLAVIX) 75 MG tablet Take 1 tablet by mouth daily 90 tablet 3    metoprolol succinate (TOPROL XL) 50 MG extended release tablet TAKE 50 MG IN AM AND 25 IN PM. 60 tablet 3    folic acid (FOLVITE) 1 MG tablet Take 1 mg by mouth daily      Multiple Vitamins-Minerals (THERAPEUTIC MULTIVITAMIN-MINERALS) tablet Take 1 tablet by mouth daily      potassium chloride (KLOR-CON M) 10 MEQ extended release tablet Take 1 tablet by mouth daily      tamsulosin (FLOMAX) 0.4 MG capsule Take 1 capsule by mouth daily 30 capsule 5    cetirizine (ZYRTEC) 10 MG tablet Take 10 mg by mouth daily      atorvastatin (LIPITOR) 80 MG tablet Take 80 mg by mouth nightly      nitroGLYCERIN (NITROSTAT) 0.4 MG SL tablet PLACE 1 TABLET UNDER THE TONGUE EVERY 5 MINUTES AS NEEDED FOR CHEST PAIN UP TO MAX OF 3 TOTAL DOSES. IF NO RELIEF AFTER 1 DOSE, CALL 911. 25 tablet 3    aspirin 81 MG EC tablet Take 81 mg by mouth daily.  bumetanide (BUMEX) 2 MG tablet Take 1 tablet by mouth daily 30 tablet 3    gabapentin (NEURONTIN) 100 MG capsule Take 1 capsule by mouth 3 times daily for 360 days. 270 capsule 3     No current facility-administered medications for this visit. No Known Allergies    SUBJECTIVE:   Review of Systems   Constitutional: Negative for activity change, appetite change, fatigue and fever. HENT: Negative for congestion. Respiratory: Negative for apnea, cough, chest tightness, shortness of breath and wheezing. Cardiovascular: Negative for chest pain, palpitations and leg swelling. Gastrointestinal: Negative for abdominal distention, abdominal pain and nausea. Genitourinary: Negative for difficulty urinating and dysuria. Musculoskeletal: Negative for arthralgias and gait problem. Skin: Negative for color change. Neurological: Negative for dizziness, numbness and headaches. Psychiatric/Behavioral: Negative for agitation, confusion and sleep disturbance.  The patient 40%  with global hypokinesis with mitral regurgitation noted.     RIGHT HEART PRESSURES:  Right heart pressures revealed borderline  elevation in pulmonary pressure of 35 mmHg systolic.  No evidence of  intracardiac shunting.     CORONARY ARTERIOGRAM RESULTS:  1.  Left main is patent, gives rise to left anterior descending and left  circumflex artery. 2.  Left anterior descending artery has a stent which is patent with  luminal irregularity with the diagonal artery with 50% stenosis. 3.  Left circumflex artery is relatively small, nondominant, has diffuse  nonobstructive mild disease. 4.  Right coronary artery is dominant, has a stent which is patent with  mild nonobstructive disease.     CONCLUSION:  1.  Nonobstructive coronary artery disease. 2.  LV dysfunction with mitral regurgitation. 3.  Borderline pulmonary hypertension. 4.  No complication.     RECOMMENDATIONS:  At this point, the patient is to follow with Dr. Kelly Staff  on further evaluation for mitral clipping.     Results reviewed:  BNP:   Lab Results   Component Value Date    BNP 5,218 05/03/2019    PROBNP 2972.0 (H) 10/27/2020     CBC:   Lab Results   Component Value Date    WBC 6.1 09/14/2020    RBC 3.69 09/14/2020    HGB 12.8 09/14/2020    HCT 38.3 09/14/2020     09/14/2020     CMP:    Lab Results   Component Value Date     10/27/2020    K 3.8 10/27/2020    K 5.3 09/14/2020    CL 94 10/27/2020    CO2 21 10/27/2020    BUN 16 10/27/2020    CREATININE 1.5 10/27/2020    LABGLOM 46 10/27/2020    GLUCOSE 89 10/27/2020    CALCIUM 9.5 10/27/2020     Hepatic Function Panel:    Lab Results   Component Value Date    ALKPHOS 109 05/03/2019    ALT 10 05/03/2019    AST 16 05/03/2019    PROT 6.9 02/04/2019    BILITOT 0.4 05/03/2019    BILIDIR <0.2 02/04/2019    LABALBU 4.6 05/03/2019     Magnesium:    Lab Results   Component Value Date    MG 2.0 10/27/2020     PT/INR:    Lab Results   Component Value Date    INR 1.03 09/14/2020     Lipids:    Lab Results   Component Value Date    TRIG 79 02/05/2019    HDL 40 02/05/2019    LDLCALC 31 02/05/2019       ASSESSMENT AND PLAN:   The patient's condition/symptoms are Stable      Diagnosis Orders   1. CHF (congestive heart failure), NYHA class II, chronic, combined (HCC)  Basic Metabolic Panel    Magnesium    Brain Natriuretic Peptide   2. Severe mitral regurgitation     3. Essential hypertension     4. Medtronic BiV ICD-no atrial lead     5. Coronary artery disease involving native coronary artery of native heart with angina pectoris ([address]     6. Deafness congenital     7. Alcohol abuse         Plan:  · GDMT   · ACE/ARB/ARNi: Irbesartan 300 mg daily  · Beta Blocker: Toprol 50 daily  · Aldosterone antagonist: no  · Diuretic/Potassium: Bumex 2 mg daily, Potassium 10 mEq daily  · Hydralazine/Nitrate: no  · Other: Sodium chloride 2 gm tid, Plavix, Lipitor, ASA 81 mg  · BMP, BNP, and Mag reviewed. Kidney function is stable. Sodium is stable 130. He has cut back on his beer intake. BNP is elevated chronically likely d/t CKD. He has no signs of fluid overload today. LE edema has resolved and he is wearing compression socks. He is scheduled for MitraClip next week. He is on GDMT. It is difficult to communicate with the patient as he is deaf/mute and does not use or know American sign language. His son whom he lives with interprets for him. I discussed with the son in detail s/s of CHF and he verbalizes and agrees to notify us with any changes both pre and post procedure. He will also ensure the patient weighs daily and takes his medications as prescribed. I also advised him to try to monitor the fluid intake and continue on decreasing the beer. · HF Zones reviewed.   · Daily weights and record  · Fluid restriction of 2 Liters per day (64 oz)   · Limit sodium in diet to 8691-5741 mg/day  · Healthier food options were discussed   · Monitor BP daily 1 hour after meds are taken  · Increase activity as tolerated     Patient was instructed to call the Heart Failure Clinic for changes in the following symptoms:    Weight gain of 3 pounds in 1 day or 5 pounds in 1 week   Increased shortness of breath   Shortness of breath while laying down   Cough   Chest pain   Swelling in feet, ankles or legs   Tenderness or bloating in the abdomen   Fatigue    Decreased appetite or feeling \"full\"   Nausea    Confusion     I will see 2 weeks after MitrClip     Return in about 3 months (around 1/27/2021). or sooner if needed     Patient given educational materials - see patient instructions. We discussed the importance of weighing oneself and recording daily. We also discussed the importance of a low sodium diet, higher sodium foods to avoid and appropriate low sodium food choices. Discussed use, benefit, and side effects of prescribed medications. All patient questions answered. Patient verbalizes understanding of plan of care using teach back method, and is agreeable to the treatment plan. Greater then 40 minutes of time was spent reviewing the chart and educating the patient about HF, medications, diet, exercise, and discussing the plan of care. I personally spent more then 50% of the appt time face to face with the patient counseling/coordinating patient's care.       Electronicallysigned by SUSAN Waldrop CNP on 10/27/2020 at 3:54 PM

## 2020-10-29 LAB — SARS-COV-2: NOT DETECTED

## 2020-11-02 ENCOUNTER — PREP FOR PROCEDURE (OUTPATIENT)
Dept: CARDIOLOGY | Age: 70
End: 2020-11-02

## 2020-11-02 RX ORDER — 0.9 % SODIUM CHLORIDE 0.9 %
250 INTRAVENOUS SOLUTION INTRAVENOUS ONCE
Status: CANCELLED | OUTPATIENT
Start: 2020-11-02 | End: 2020-11-02

## 2020-11-02 RX ORDER — CHLORHEXIDINE GLUCONATE 4 G/100ML
SOLUTION TOPICAL ONCE
Status: CANCELLED | OUTPATIENT
Start: 2020-11-02 | End: 2020-11-02

## 2020-11-02 RX ORDER — SODIUM CHLORIDE 0.9 % (FLUSH) 0.9 %
10 SYRINGE (ML) INJECTION EVERY 12 HOURS SCHEDULED
Status: CANCELLED | OUTPATIENT
Start: 2020-11-02

## 2020-11-02 RX ORDER — SODIUM CHLORIDE 9 MG/ML
INJECTION, SOLUTION INTRAVENOUS CONTINUOUS
Status: CANCELLED | OUTPATIENT
Start: 2020-11-02

## 2020-11-02 RX ORDER — SODIUM CHLORIDE 0.9 % (FLUSH) 0.9 %
10 SYRINGE (ML) INJECTION PRN
Status: CANCELLED | OUTPATIENT
Start: 2020-11-02

## 2020-11-03 ENCOUNTER — ANESTHESIA (OUTPATIENT)
Dept: CARDIAC CATH/INVASIVE PROCEDURES | Age: 70
DRG: 267 | End: 2020-11-03
Payer: MEDICARE

## 2020-11-03 ENCOUNTER — HOSPITAL ENCOUNTER (INPATIENT)
Dept: INPATIENT UNIT | Age: 70
LOS: 3 days | Discharge: HOME | DRG: 267 | End: 2020-11-06
Attending: INTERNAL MEDICINE | Admitting: INTERNAL MEDICINE
Payer: MEDICARE

## 2020-11-03 ENCOUNTER — APPOINTMENT (OUTPATIENT)
Dept: CARDIAC CATH/INVASIVE PROCEDURES | Age: 70
DRG: 267 | End: 2020-11-03
Attending: INTERNAL MEDICINE
Payer: MEDICARE

## 2020-11-03 ENCOUNTER — ANESTHESIA EVENT (OUTPATIENT)
Dept: CARDIAC CATH/INVASIVE PROCEDURES | Age: 70
DRG: 267 | End: 2020-11-03
Payer: MEDICARE

## 2020-11-03 VITALS — DIASTOLIC BLOOD PRESSURE: 63 MMHG | TEMPERATURE: 71.2 F | OXYGEN SATURATION: 100 % | SYSTOLIC BLOOD PRESSURE: 109 MMHG

## 2020-11-03 PROBLEM — I34.0 ACUTE MITRAL REGURGITATION: Status: ACTIVE | Noted: 2020-11-03

## 2020-11-03 LAB
ABO: NORMAL
ACTIVATED CLOTTING TIME: 263 SECONDS (ref 1–150)
ACTIVATED CLOTTING TIME: 279 SECONDS (ref 1–150)
ACTIVATED CLOTTING TIME: 296 SECONDS (ref 1–150)
ALBUMIN SERPL-MCNC: 3.6 G/DL (ref 3.5–5.1)
ALP BLD-CCNC: 120 U/L (ref 38–126)
ALT SERPL-CCNC: 8 U/L (ref 11–66)
ANION GAP SERPL CALCULATED.3IONS-SCNC: 11 MEQ/L (ref 8–16)
ANION GAP SERPL CALCULATED.3IONS-SCNC: 13 MEQ/L (ref 8–16)
ANTIBODY SCREEN: NORMAL
APTT: 31.2 SECONDS (ref 22–38)
AST SERPL-CCNC: 13 U/L (ref 5–40)
BACTERIA: ABNORMAL
BILIRUB SERPL-MCNC: 0.3 MG/DL (ref 0.3–1.2)
BILIRUBIN URINE: NEGATIVE
BLOOD, URINE: ABNORMAL
BUN BLDV-MCNC: 12 MG/DL (ref 7–22)
BUN BLDV-MCNC: 13 MG/DL (ref 7–22)
CALCIUM SERPL-MCNC: 8.7 MG/DL (ref 8.5–10.5)
CALCIUM SERPL-MCNC: 9.5 MG/DL (ref 8.5–10.5)
CASTS: ABNORMAL /LPF
CASTS: ABNORMAL /LPF
CHARACTER, URINE: CLEAR
CHLORIDE BLD-SCNC: 106 MEQ/L (ref 98–111)
CHLORIDE BLD-SCNC: 96 MEQ/L (ref 98–111)
CO2: 18 MEQ/L (ref 23–33)
CO2: 21 MEQ/L (ref 23–33)
COLLECTED BY:: ABNORMAL
COLOR: YELLOW
CREAT SERPL-MCNC: 1.6 MG/DL (ref 0.4–1.2)
CREAT SERPL-MCNC: 1.8 MG/DL (ref 0.4–1.2)
CRYSTALS: ABNORMAL
EKG ATRIAL RATE: 75 BPM
EKG Q-T INTERVAL: 472 MS
EKG QRS DURATION: 158 MS
EKG QTC CALCULATION (BAZETT): 523 MS
EKG R AXIS: -28 DEGREES
EKG T AXIS: 7 DEGREES
EKG VENTRICULAR RATE: 74 BPM
EPITHELIAL CELLS, UA: ABNORMAL /HPF
ERYTHROCYTE [DISTWIDTH] IN BLOOD BY AUTOMATED COUNT: 13.2 % (ref 11.5–14.5)
ERYTHROCYTE [DISTWIDTH] IN BLOOD BY AUTOMATED COUNT: 13.8 % (ref 11.5–14.5)
ERYTHROCYTE [DISTWIDTH] IN BLOOD BY AUTOMATED COUNT: 49.2 FL (ref 35–45)
ERYTHROCYTE [DISTWIDTH] IN BLOOD BY AUTOMATED COUNT: 52.5 FL (ref 35–45)
GFR SERPL CREATININE-BSD FRML MDRD: 37 ML/MIN/1.73M2
GFR SERPL CREATININE-BSD FRML MDRD: 43 ML/MIN/1.73M2
GLUCOSE BLD-MCNC: 177 MG/DL (ref 70–108)
GLUCOSE BLD-MCNC: 95 MG/DL (ref 70–108)
GLUCOSE, URINE: NEGATIVE MG/DL
HCT VFR BLD CALC: 35.3 % (ref 42–52)
HCT VFR BLD CALC: 37.6 % (ref 42–52)
HEMOGLOBIN: 11.4 GM/DL (ref 14–18)
HEMOGLOBIN: 12.2 GM/DL (ref 14–18)
INR BLD: 1.19 (ref 0.85–1.13)
KETONES, URINE: NEGATIVE
LEUKOCYTE EST, POC: NEGATIVE
MCH RBC QN AUTO: 32.9 PG (ref 26–33)
MCH RBC QN AUTO: 33.3 PG (ref 26–33)
MCHC RBC AUTO-ENTMCNC: 32.3 GM/DL (ref 32.2–35.5)
MCHC RBC AUTO-ENTMCNC: 32.4 GM/DL (ref 32.2–35.5)
MCV RBC AUTO: 102 FL (ref 80–94)
MCV RBC AUTO: 102.7 FL (ref 80–94)
MISCELLANEOUS LAB TEST RESULT: ABNORMAL
MRSA SCREEN RT-PCR: NEGATIVE
NITRITE, URINE: NEGATIVE
PH UA: 5 (ref 5–9)
PLATELET # BLD: 270 THOU/MM3 (ref 130–400)
PLATELET # BLD: 356 THOU/MM3 (ref 130–400)
PMV BLD AUTO: 9.4 FL (ref 9.4–12.4)
PMV BLD AUTO: 9.6 FL (ref 9.4–12.4)
POC O2 SATURATION: 98 % (ref 94–97)
POTASSIUM SERPL-SCNC: 4.5 MEQ/L (ref 3.5–5.2)
POTASSIUM SERPL-SCNC: 5.2 MEQ/L (ref 3.5–5.2)
PRO-BNP: 2159 PG/ML (ref 0–900)
PROTEIN UA: ABNORMAL MG/DL
RBC # BLD: 3.46 MILL/MM3 (ref 4.7–6.1)
RBC # BLD: 3.66 MILL/MM3 (ref 4.7–6.1)
RBC URINE: ABNORMAL /HPF
RENAL EPITHELIAL, UA: ABNORMAL
RH FACTOR: NORMAL
SODIUM BLD-SCNC: 130 MEQ/L (ref 135–145)
SODIUM BLD-SCNC: 135 MEQ/L (ref 135–145)
SPECIFIC GRAVITY UA: 1 (ref 1–1.03)
TOTAL PROTEIN: 6.6 G/DL (ref 6.1–8)
UROBILINOGEN, URINE: 0.2 EU/DL (ref 0–1)
WBC # BLD: 12 THOU/MM3 (ref 4.8–10.8)
WBC # BLD: 5.7 THOU/MM3 (ref 4.8–10.8)
WBC UA: ABNORMAL /HPF
YEAST: ABNORMAL

## 2020-11-03 PROCEDURE — 7100000000 HC PACU RECOVERY - FIRST 15 MIN

## 2020-11-03 PROCEDURE — 85347 COAGULATION TIME ACTIVATED: CPT

## 2020-11-03 PROCEDURE — 86901 BLOOD TYPING SEROLOGIC RH(D): CPT

## 2020-11-03 PROCEDURE — 2720000010 HC SURG SUPPLY STERILE

## 2020-11-03 PROCEDURE — 85027 COMPLETE CBC AUTOMATED: CPT

## 2020-11-03 PROCEDURE — B24BZZ4 ULTRASONOGRAPHY OF HEART WITH AORTA, TRANSESOPHAGEAL: ICD-10-PCS | Performed by: ANESTHESIOLOGY

## 2020-11-03 PROCEDURE — 33016 PERICARDIOCENTESIS W/IMAGING: CPT | Performed by: INTERNAL MEDICINE

## 2020-11-03 PROCEDURE — 83880 ASSAY OF NATRIURETIC PEPTIDE: CPT

## 2020-11-03 PROCEDURE — 86850 RBC ANTIBODY SCREEN: CPT

## 2020-11-03 PROCEDURE — 33419 REPAIR TCAT MITRAL VALVE: CPT | Performed by: INTERNAL MEDICINE

## 2020-11-03 PROCEDURE — 02UG3JZ SUPPLEMENT MITRAL VALVE WITH SYNTHETIC SUBSTITUTE, PERCUTANEOUS APPROACH: ICD-10-PCS | Performed by: INTERNAL MEDICINE

## 2020-11-03 PROCEDURE — 33418 REPAIR TCAT MITRAL VALVE: CPT | Performed by: INTERNAL MEDICINE

## 2020-11-03 PROCEDURE — 93005 ELECTROCARDIOGRAM TRACING: CPT | Performed by: PHYSICIAN ASSISTANT

## 2020-11-03 PROCEDURE — 36415 COLL VENOUS BLD VENIPUNCTURE: CPT

## 2020-11-03 PROCEDURE — 6360000002 HC RX W HCPCS: Performed by: ANESTHESIOLOGY

## 2020-11-03 PROCEDURE — 3700000001 HC ADD 15 MINUTES (ANESTHESIA)

## 2020-11-03 PROCEDURE — 86923 COMPATIBILITY TEST ELECTRIC: CPT

## 2020-11-03 PROCEDURE — 6360000002 HC RX W HCPCS: Performed by: NURSE ANESTHETIST, CERTIFIED REGISTERED

## 2020-11-03 PROCEDURE — 7100000001 HC PACU RECOVERY - ADDTL 15 MIN

## 2020-11-03 PROCEDURE — C1729 CATH, DRAINAGE: HCPCS

## 2020-11-03 PROCEDURE — 96360 HYDRATION IV INFUSION INIT: CPT

## 2020-11-03 PROCEDURE — 2580000003 HC RX 258: Performed by: PHYSICIAN ASSISTANT

## 2020-11-03 PROCEDURE — 2000000000 HC ICU R&B

## 2020-11-03 PROCEDURE — 86900 BLOOD TYPING SEROLOGIC ABO: CPT

## 2020-11-03 PROCEDURE — 85730 THROMBOPLASTIN TIME PARTIAL: CPT

## 2020-11-03 PROCEDURE — 02HV33Z INSERTION OF INFUSION DEVICE INTO SUPERIOR VENA CAVA, PERCUTANEOUS APPROACH: ICD-10-PCS | Performed by: INTERNAL MEDICINE

## 2020-11-03 PROCEDURE — P9016 RBC LEUKOCYTES REDUCED: HCPCS

## 2020-11-03 PROCEDURE — 81001 URINALYSIS AUTO W/SCOPE: CPT

## 2020-11-03 PROCEDURE — 2580000003 HC RX 258: Performed by: NURSE ANESTHETIST, CERTIFIED REGISTERED

## 2020-11-03 PROCEDURE — C1769 GUIDE WIRE: HCPCS

## 2020-11-03 PROCEDURE — C1760 CLOSURE DEV, VASC: HCPCS

## 2020-11-03 PROCEDURE — 93010 ELECTROCARDIOGRAM REPORT: CPT | Performed by: INTERNAL MEDICINE

## 2020-11-03 PROCEDURE — 6370000000 HC RX 637 (ALT 250 FOR IP): Performed by: INTERNAL MEDICINE

## 2020-11-03 PROCEDURE — 2500000003 HC RX 250 WO HCPCS: Performed by: NURSE ANESTHETIST, CERTIFIED REGISTERED

## 2020-11-03 PROCEDURE — 3700000000 HC ANESTHESIA ATTENDED CARE

## 2020-11-03 PROCEDURE — 0W9D30Z DRAINAGE OF PERICARDIAL CAVITY WITH DRAINAGE DEVICE, PERCUTANEOUS APPROACH: ICD-10-PCS | Performed by: INTERNAL MEDICINE

## 2020-11-03 PROCEDURE — 80053 COMPREHEN METABOLIC PANEL: CPT

## 2020-11-03 PROCEDURE — 87641 MR-STAPH DNA AMP PROBE: CPT

## 2020-11-03 PROCEDURE — 6370000000 HC RX 637 (ALT 250 FOR IP)

## 2020-11-03 PROCEDURE — C1894 INTRO/SHEATH, NON-LASER: HCPCS

## 2020-11-03 PROCEDURE — 82810 BLOOD GASES O2 SAT ONLY: CPT

## 2020-11-03 PROCEDURE — 6360000002 HC RX W HCPCS: Performed by: PHYSICIAN ASSISTANT

## 2020-11-03 PROCEDURE — 85610 PROTHROMBIN TIME: CPT

## 2020-11-03 RX ORDER — SODIUM CHLORIDE 9 MG/ML
INJECTION, SOLUTION INTRAVENOUS CONTINUOUS
Status: DISCONTINUED | OUTPATIENT
Start: 2020-11-03 | End: 2020-11-06 | Stop reason: HOSPADM

## 2020-11-03 RX ORDER — PROTAMINE SULFATE 10 MG/ML
INJECTION, SOLUTION INTRAVENOUS PRN
Status: DISCONTINUED | OUTPATIENT
Start: 2020-11-03 | End: 2020-11-03 | Stop reason: SDUPTHER

## 2020-11-03 RX ORDER — FENTANYL CITRATE 50 UG/ML
INJECTION, SOLUTION INTRAMUSCULAR; INTRAVENOUS PRN
Status: DISCONTINUED | OUTPATIENT
Start: 2020-11-03 | End: 2020-11-03 | Stop reason: SDUPTHER

## 2020-11-03 RX ORDER — LABETALOL 20 MG/4 ML (5 MG/ML) INTRAVENOUS SYRINGE
10 EVERY 10 MIN PRN
Status: DISCONTINUED | OUTPATIENT
Start: 2020-11-03 | End: 2020-11-06 | Stop reason: HOSPADM

## 2020-11-03 RX ORDER — ALPRAZOLAM 0.25 MG/1
0.25 TABLET ORAL EVERY 4 HOURS PRN
Status: DISCONTINUED | OUTPATIENT
Start: 2020-11-03 | End: 2020-11-06 | Stop reason: HOSPADM

## 2020-11-03 RX ORDER — HYDROCODONE BITARTRATE AND ACETAMINOPHEN 5; 325 MG/1; MG/1
1 TABLET ORAL EVERY 4 HOURS PRN
Status: DISCONTINUED | OUTPATIENT
Start: 2020-11-03 | End: 2020-11-06 | Stop reason: HOSPADM

## 2020-11-03 RX ORDER — SODIUM CHLORIDE 0.9 % (FLUSH) 0.9 %
10 SYRINGE (ML) INJECTION PRN
Status: DISCONTINUED | OUTPATIENT
Start: 2020-11-03 | End: 2020-11-06 | Stop reason: HOSPADM

## 2020-11-03 RX ORDER — SODIUM CHLORIDE 0.9 % (FLUSH) 0.9 %
10 SYRINGE (ML) INJECTION EVERY 12 HOURS SCHEDULED
Status: DISCONTINUED | OUTPATIENT
Start: 2020-11-03 | End: 2020-11-06 | Stop reason: HOSPADM

## 2020-11-03 RX ORDER — MORPHINE SULFATE 2 MG/ML
2 INJECTION, SOLUTION INTRAMUSCULAR; INTRAVENOUS EVERY 5 MIN PRN
Status: COMPLETED | OUTPATIENT
Start: 2020-11-03 | End: 2020-11-03

## 2020-11-03 RX ORDER — EPHEDRINE SULFATE/0.9% NACL/PF 50 MG/5 ML
SYRINGE (ML) INTRAVENOUS PRN
Status: DISCONTINUED | OUTPATIENT
Start: 2020-11-03 | End: 2020-11-03 | Stop reason: SDUPTHER

## 2020-11-03 RX ORDER — ROCURONIUM BROMIDE 10 MG/ML
INJECTION, SOLUTION INTRAVENOUS PRN
Status: DISCONTINUED | OUTPATIENT
Start: 2020-11-03 | End: 2020-11-03 | Stop reason: SDUPTHER

## 2020-11-03 RX ORDER — MIDAZOLAM HYDROCHLORIDE 1 MG/ML
INJECTION INTRAMUSCULAR; INTRAVENOUS PRN
Status: DISCONTINUED | OUTPATIENT
Start: 2020-11-03 | End: 2020-11-03 | Stop reason: SDUPTHER

## 2020-11-03 RX ORDER — PROPOFOL 10 MG/ML
INJECTION, EMULSION INTRAVENOUS PRN
Status: DISCONTINUED | OUTPATIENT
Start: 2020-11-03 | End: 2020-11-03 | Stop reason: SDUPTHER

## 2020-11-03 RX ORDER — FENTANYL CITRATE 50 UG/ML
50 INJECTION, SOLUTION INTRAMUSCULAR; INTRAVENOUS EVERY 5 MIN PRN
Status: COMPLETED | OUTPATIENT
Start: 2020-11-03 | End: 2020-11-03

## 2020-11-03 RX ORDER — SUCCINYLCHOLINE/SOD CL,ISO/PF 200MG/10ML
SYRINGE (ML) INTRAVENOUS PRN
Status: DISCONTINUED | OUTPATIENT
Start: 2020-11-03 | End: 2020-11-03 | Stop reason: SDUPTHER

## 2020-11-03 RX ORDER — ONDANSETRON 2 MG/ML
INJECTION INTRAMUSCULAR; INTRAVENOUS PRN
Status: DISCONTINUED | OUTPATIENT
Start: 2020-11-03 | End: 2020-11-03 | Stop reason: SDUPTHER

## 2020-11-03 RX ORDER — 0.9 % SODIUM CHLORIDE 0.9 %
250 INTRAVENOUS SOLUTION INTRAVENOUS ONCE
Status: COMPLETED | OUTPATIENT
Start: 2020-11-03 | End: 2020-11-03

## 2020-11-03 RX ORDER — CHLORHEXIDINE GLUCONATE 4 G/100ML
SOLUTION TOPICAL ONCE
Status: DISCONTINUED | OUTPATIENT
Start: 2020-11-03 | End: 2020-11-06 | Stop reason: HOSPADM

## 2020-11-03 RX ORDER — DIPHENHYDRAMINE HCL 25 MG
25 TABLET ORAL NIGHTLY PRN
Status: DISCONTINUED | OUTPATIENT
Start: 2020-11-03 | End: 2020-11-06 | Stop reason: HOSPADM

## 2020-11-03 RX ORDER — HEPARIN SODIUM 1000 [USP'U]/ML
INJECTION, SOLUTION INTRAVENOUS; SUBCUTANEOUS PRN
Status: DISCONTINUED | OUTPATIENT
Start: 2020-11-03 | End: 2020-11-03 | Stop reason: SDUPTHER

## 2020-11-03 RX ORDER — DEXAMETHASONE SODIUM PHOSPHATE 4 MG/ML
INJECTION, SOLUTION INTRA-ARTICULAR; INTRALESIONAL; INTRAMUSCULAR; INTRAVENOUS; SOFT TISSUE PRN
Status: DISCONTINUED | OUTPATIENT
Start: 2020-11-03 | End: 2020-11-03 | Stop reason: SDUPTHER

## 2020-11-03 RX ORDER — LIDOCAINE HCL/PF 100 MG/5ML
SYRINGE (ML) INJECTION PRN
Status: DISCONTINUED | OUTPATIENT
Start: 2020-11-03 | End: 2020-11-03 | Stop reason: SDUPTHER

## 2020-11-03 RX ADMIN — MORPHINE SULFATE 2 MG: 2 INJECTION, SOLUTION INTRAMUSCULAR; INTRAVENOUS at 15:21

## 2020-11-03 RX ADMIN — Medication 10 MG: at 09:43

## 2020-11-03 RX ADMIN — ROCURONIUM BROMIDE 20 MG: 10 INJECTION INTRAVENOUS at 09:13

## 2020-11-03 RX ADMIN — EPINEPHRINE 20 MCG: 1 INJECTION INTRAMUSCULAR; INTRAVENOUS; SUBCUTANEOUS at 10:16

## 2020-11-03 RX ADMIN — ROCURONIUM BROMIDE 10 MG: 10 INJECTION INTRAVENOUS at 09:53

## 2020-11-03 RX ADMIN — Medication 140 MG: at 07:28

## 2020-11-03 RX ADMIN — FENTANYL CITRATE 50 MCG: 50 INJECTION, SOLUTION INTRAMUSCULAR; INTRAVENOUS at 08:34

## 2020-11-03 RX ADMIN — CEFAZOLIN SODIUM 2 G: 10 INJECTION, POWDER, FOR SOLUTION INTRAVENOUS at 07:35

## 2020-11-03 RX ADMIN — HEPARIN SODIUM 3000 UNITS: 1000 INJECTION INTRAVENOUS; SUBCUTANEOUS at 09:28

## 2020-11-03 RX ADMIN — Medication 5 MG: at 09:37

## 2020-11-03 RX ADMIN — Medication 10 MG: at 07:45

## 2020-11-03 RX ADMIN — SODIUM CHLORIDE: 9 INJECTION, SOLUTION INTRAVENOUS at 12:10

## 2020-11-03 RX ADMIN — PHENYLEPHRINE HYDROCHLORIDE 200 MCG: 10 INJECTION INTRAVENOUS at 07:46

## 2020-11-03 RX ADMIN — Medication 10 MG: at 10:01

## 2020-11-03 RX ADMIN — Medication 10 MG: at 09:18

## 2020-11-03 RX ADMIN — Medication 10 ML: at 20:38

## 2020-11-03 RX ADMIN — Medication 10 MG: at 09:58

## 2020-11-03 RX ADMIN — EPINEPHRINE 10 MCG: 1 INJECTION INTRAMUSCULAR; INTRAVENOUS; SUBCUTANEOUS at 10:32

## 2020-11-03 RX ADMIN — PROTAMINE SULFATE 50 MG: 10 INJECTION, SOLUTION INTRAVENOUS at 10:16

## 2020-11-03 RX ADMIN — MIDAZOLAM HYDROCHLORIDE 2 MG: 1 INJECTION, SOLUTION INTRAMUSCULAR; INTRAVENOUS at 07:17

## 2020-11-03 RX ADMIN — DEXAMETHASONE SODIUM PHOSPHATE 8 MG: 4 INJECTION, SOLUTION INTRAMUSCULAR; INTRAVENOUS at 07:58

## 2020-11-03 RX ADMIN — ROCURONIUM BROMIDE 10 MG: 10 INJECTION INTRAVENOUS at 10:43

## 2020-11-03 RX ADMIN — FENTANYL CITRATE 50 MCG: 50 INJECTION, SOLUTION INTRAMUSCULAR; INTRAVENOUS at 11:55

## 2020-11-03 RX ADMIN — PHENYLEPHRINE HYDROCHLORIDE 200 MCG: 10 INJECTION INTRAVENOUS at 07:33

## 2020-11-03 RX ADMIN — MORPHINE SULFATE 2 MG: 2 INJECTION, SOLUTION INTRAMUSCULAR; INTRAVENOUS at 16:46

## 2020-11-03 RX ADMIN — SUGAMMADEX 400 MG: 100 INJECTION, SOLUTION INTRAVENOUS at 11:27

## 2020-11-03 RX ADMIN — PHENYLEPHRINE HYDROCHLORIDE 200 MCG: 10 INJECTION INTRAVENOUS at 07:42

## 2020-11-03 RX ADMIN — Medication 100 MG: at 07:28

## 2020-11-03 RX ADMIN — PHENYLEPHRINE HYDROCHLORIDE 200 MCG: 10 INJECTION INTRAVENOUS at 07:50

## 2020-11-03 RX ADMIN — HEPARIN SODIUM 2000 UNITS: 1000 INJECTION INTRAVENOUS; SUBCUTANEOUS at 08:18

## 2020-11-03 RX ADMIN — PHENYLEPHRINE HYDROCHLORIDE 200 MCG: 10 INJECTION INTRAVENOUS at 08:18

## 2020-11-03 RX ADMIN — HYDROCODONE BITARTRATE AND ACETAMINOPHEN 1 TABLET: 5; 325 TABLET ORAL at 20:37

## 2020-11-03 RX ADMIN — PHENYLEPHRINE HYDROCHLORIDE 200 MCG: 10 INJECTION INTRAVENOUS at 07:37

## 2020-11-03 RX ADMIN — ROCURONIUM BROMIDE 30 MG: 10 INJECTION INTRAVENOUS at 07:43

## 2020-11-03 RX ADMIN — Medication 10 MG: at 08:45

## 2020-11-03 RX ADMIN — MORPHINE SULFATE 2 MG: 2 INJECTION, SOLUTION INTRAMUSCULAR; INTRAVENOUS at 12:00

## 2020-11-03 RX ADMIN — PHENYLEPHRINE HYDROCHLORIDE 100 MCG/MIN: 10 INJECTION INTRAVENOUS at 08:32

## 2020-11-03 RX ADMIN — MORPHINE SULFATE 2 MG: 2 INJECTION, SOLUTION INTRAMUSCULAR; INTRAVENOUS at 13:18

## 2020-11-03 RX ADMIN — FENTANYL CITRATE 50 MCG: 50 INJECTION, SOLUTION INTRAMUSCULAR; INTRAVENOUS at 16:57

## 2020-11-03 RX ADMIN — SODIUM CHLORIDE: 9 INJECTION, SOLUTION INTRAVENOUS at 08:53

## 2020-11-03 RX ADMIN — SODIUM CHLORIDE 250 ML: 9 INJECTION, SOLUTION INTRAVENOUS at 06:59

## 2020-11-03 RX ADMIN — Medication 10 MG: at 07:56

## 2020-11-03 RX ADMIN — ONDANSETRON 4 MG: 2 INJECTION INTRAMUSCULAR; INTRAVENOUS at 07:58

## 2020-11-03 RX ADMIN — FENTANYL CITRATE 50 MCG: 50 INJECTION, SOLUTION INTRAMUSCULAR; INTRAVENOUS at 07:48

## 2020-11-03 RX ADMIN — Medication 10 MG: at 08:32

## 2020-11-03 RX ADMIN — PROPOFOL 150 MG: 10 INJECTION, EMULSION INTRAVENOUS at 07:28

## 2020-11-03 RX ADMIN — SODIUM CHLORIDE: 9 INJECTION, SOLUTION INTRAVENOUS at 06:16

## 2020-11-03 RX ADMIN — Medication 5 MG: at 09:40

## 2020-11-03 RX ADMIN — HEPARIN SODIUM 10000 UNITS: 1000 INJECTION INTRAVENOUS; SUBCUTANEOUS at 08:27

## 2020-11-03 RX ADMIN — FENTANYL CITRATE 50 MCG: 50 INJECTION, SOLUTION INTRAMUSCULAR; INTRAVENOUS at 16:45

## 2020-11-03 RX ADMIN — PHENYLEPHRINE HYDROCHLORIDE 200 MCG: 10 INJECTION INTRAVENOUS at 07:54

## 2020-11-03 RX ADMIN — FENTANYL CITRATE 50 MCG: 50 INJECTION, SOLUTION INTRAMUSCULAR; INTRAVENOUS at 12:05

## 2020-11-03 RX ADMIN — PHENYLEPHRINE HYDROCHLORIDE 200 MCG: 10 INJECTION INTRAVENOUS at 07:30

## 2020-11-03 RX ADMIN — ROCURONIUM BROMIDE 20 MG: 10 INJECTION INTRAVENOUS at 08:18

## 2020-11-03 RX ADMIN — DIPHENHYDRAMINE HCL 25 MG: 25 TABLET ORAL at 20:37

## 2020-11-03 RX ADMIN — Medication 10 MG: at 08:13

## 2020-11-03 ASSESSMENT — PULMONARY FUNCTION TESTS
PIF_VALUE: 15
PIF_VALUE: 18
PIF_VALUE: 15
PIF_VALUE: 14
PIF_VALUE: 15
PIF_VALUE: 15
PIF_VALUE: 18
PIF_VALUE: 14
PIF_VALUE: 15
PIF_VALUE: 14
PIF_VALUE: 18
PIF_VALUE: 14
PIF_VALUE: 18
PIF_VALUE: 1
PIF_VALUE: 14
PIF_VALUE: 18
PIF_VALUE: 18
PIF_VALUE: 14
PIF_VALUE: 15
PIF_VALUE: 14
PIF_VALUE: 16
PIF_VALUE: 18
PIF_VALUE: 14
PIF_VALUE: 14
PIF_VALUE: 1
PIF_VALUE: 15
PIF_VALUE: 3
PIF_VALUE: 15
PIF_VALUE: 18
PIF_VALUE: 15
PIF_VALUE: 18
PIF_VALUE: 14
PIF_VALUE: 14
PIF_VALUE: 15
PIF_VALUE: 14
PIF_VALUE: 15
PIF_VALUE: 18
PIF_VALUE: 14
PIF_VALUE: 14
PIF_VALUE: 18
PIF_VALUE: 15
PIF_VALUE: 14
PIF_VALUE: 18
PIF_VALUE: 15
PIF_VALUE: 15
PIF_VALUE: 14
PIF_VALUE: 18
PIF_VALUE: 14
PIF_VALUE: 15
PIF_VALUE: 18
PIF_VALUE: 18
PIF_VALUE: 15
PIF_VALUE: 18
PIF_VALUE: 18
PIF_VALUE: 14
PIF_VALUE: 14
PIF_VALUE: 1
PIF_VALUE: 14
PIF_VALUE: 18
PIF_VALUE: 1
PIF_VALUE: 18
PIF_VALUE: 14
PIF_VALUE: 15
PIF_VALUE: 14
PIF_VALUE: 16
PIF_VALUE: 19
PIF_VALUE: 15
PIF_VALUE: 14
PIF_VALUE: 18
PIF_VALUE: 14
PIF_VALUE: 18
PIF_VALUE: 14
PIF_VALUE: 18
PIF_VALUE: 15
PIF_VALUE: 18
PIF_VALUE: 19
PIF_VALUE: 18
PIF_VALUE: 16
PIF_VALUE: 1
PIF_VALUE: 14
PIF_VALUE: 14
PIF_VALUE: 1
PIF_VALUE: 18
PIF_VALUE: 14
PIF_VALUE: 1
PIF_VALUE: 18
PIF_VALUE: 15
PIF_VALUE: 14
PIF_VALUE: 18
PIF_VALUE: 14
PIF_VALUE: 14
PIF_VALUE: 18
PIF_VALUE: 18
PIF_VALUE: 14
PIF_VALUE: 15
PIF_VALUE: 15
PIF_VALUE: 14
PIF_VALUE: 16
PIF_VALUE: 15
PIF_VALUE: 14
PIF_VALUE: 15
PIF_VALUE: 15
PIF_VALUE: 16
PIF_VALUE: 14
PIF_VALUE: 15
PIF_VALUE: 14
PIF_VALUE: 14
PIF_VALUE: 18
PIF_VALUE: 14
PIF_VALUE: 14
PIF_VALUE: 18
PIF_VALUE: 1
PIF_VALUE: 18
PIF_VALUE: 16
PIF_VALUE: 18
PIF_VALUE: 16
PIF_VALUE: 15
PIF_VALUE: 15
PIF_VALUE: 18
PIF_VALUE: 16
PIF_VALUE: 14
PIF_VALUE: 2
PIF_VALUE: 18
PIF_VALUE: 15
PIF_VALUE: 14
PIF_VALUE: 18
PIF_VALUE: 14
PIF_VALUE: 15
PIF_VALUE: 18
PIF_VALUE: 14
PIF_VALUE: 15
PIF_VALUE: 15
PIF_VALUE: 18
PIF_VALUE: 14
PIF_VALUE: 15
PIF_VALUE: 16
PIF_VALUE: 16
PIF_VALUE: 14
PIF_VALUE: 15
PIF_VALUE: 15
PIF_VALUE: 14
PIF_VALUE: 14
PIF_VALUE: 19
PIF_VALUE: 18
PIF_VALUE: 15
PIF_VALUE: 14
PIF_VALUE: 14
PIF_VALUE: 18
PIF_VALUE: 16
PIF_VALUE: 14
PIF_VALUE: 17
PIF_VALUE: 14
PIF_VALUE: 14
PIF_VALUE: 16
PIF_VALUE: 18
PIF_VALUE: 15
PIF_VALUE: 18
PIF_VALUE: 18
PIF_VALUE: 14
PIF_VALUE: 14
PIF_VALUE: 15
PIF_VALUE: 15
PIF_VALUE: 18
PIF_VALUE: 14
PIF_VALUE: 15
PIF_VALUE: 14
PIF_VALUE: 15
PIF_VALUE: 15
PIF_VALUE: 1
PIF_VALUE: 3
PIF_VALUE: 14
PIF_VALUE: 18
PIF_VALUE: 15
PIF_VALUE: 14
PIF_VALUE: 18
PIF_VALUE: 14
PIF_VALUE: 15
PIF_VALUE: 15
PIF_VALUE: 18
PIF_VALUE: 1
PIF_VALUE: 16
PIF_VALUE: 18
PIF_VALUE: 14
PIF_VALUE: 14
PIF_VALUE: 15
PIF_VALUE: 1
PIF_VALUE: 18
PIF_VALUE: 15
PIF_VALUE: 16
PIF_VALUE: 14
PIF_VALUE: 16
PIF_VALUE: 0
PIF_VALUE: 14
PIF_VALUE: 16
PIF_VALUE: 15
PIF_VALUE: 13
PIF_VALUE: 14
PIF_VALUE: 15
PIF_VALUE: 18
PIF_VALUE: 15
PIF_VALUE: 15
PIF_VALUE: 13
PIF_VALUE: 16
PIF_VALUE: 14
PIF_VALUE: 14
PIF_VALUE: 18
PIF_VALUE: 15
PIF_VALUE: 1
PIF_VALUE: 15
PIF_VALUE: 16
PIF_VALUE: 18
PIF_VALUE: 15
PIF_VALUE: 18
PIF_VALUE: 14
PIF_VALUE: 18
PIF_VALUE: 19
PIF_VALUE: 14
PIF_VALUE: 18
PIF_VALUE: 14
PIF_VALUE: 16
PIF_VALUE: 15
PIF_VALUE: 18
PIF_VALUE: 18
PIF_VALUE: 15
PIF_VALUE: 18
PIF_VALUE: 14
PIF_VALUE: 18
PIF_VALUE: 14
PIF_VALUE: 18

## 2020-11-03 ASSESSMENT — PAIN SCALES - GENERAL
PAINLEVEL_OUTOF10: 7
PAINLEVEL_OUTOF10: 4
PAINLEVEL_OUTOF10: 4
PAINLEVEL_OUTOF10: 9
PAINLEVEL_OUTOF10: 7
PAINLEVEL_OUTOF10: 3
PAINLEVEL_OUTOF10: 4
PAINLEVEL_OUTOF10: 5
PAINLEVEL_OUTOF10: 6
PAINLEVEL_OUTOF10: 9
PAINLEVEL_OUTOF10: 5
PAINLEVEL_OUTOF10: 0
PAINLEVEL_OUTOF10: 9
PAINLEVEL_OUTOF10: 6
PAINLEVEL_OUTOF10: 7
PAINLEVEL_OUTOF10: 9

## 2020-11-03 ASSESSMENT — PAIN DESCRIPTION - LOCATION: LOCATION: CHEST

## 2020-11-03 ASSESSMENT — PAIN DESCRIPTION - PAIN TYPE
TYPE: SURGICAL PAIN
TYPE: SURGICAL PAIN

## 2020-11-03 ASSESSMENT — PAIN DESCRIPTION - PROGRESSION: CLINICAL_PROGRESSION: GRADUALLY IMPROVING

## 2020-11-03 NOTE — ANESTHESIA PROCEDURE NOTES
Procedure Performed: [facility]       Start Time:  11/3/2020 8:00 AM       End Time:   11/3/2020 11:20 AM    Preanesthesia Checklist:  Patient identified, IV assessed, risks and benefits discussed, monitors and equipment assessed, procedure being performed at surgeon's request and anesthesia consent obtained. General Procedure Information  Diagnostic Indications for Echo:  assessment of surgical repair, defect repair evaluation, suspected pericardial effusion and assessment of valve function  Physician Requesting Echo: Minor Segundo  CPT Code:  [id number]  Location performed:  OR  Intubated  Bite block not placed  Heart visualized  Probe Insertion:  Easy  Probe Type:  3D  Modalities:  2D only, 3D, color flow mapping, continuous wave Doppler, pulse wave Doppler and M-mode        Anesthesia Information  Performed with CRNAs  Anesthesiologist:  Patrick Kent MD      Echocardiogram Comments:       INTRA OP PROCEDURE GUIDANCE VA. INSERTED WELL LUBRICATED 3 D VA PROBE. May Morse EASY ATRAUMATIC INSERTION. COMPREHENSIVE STUDY ACQUIRED. NO THROMBUS OR CLOTS IN YANNICK OR INTRA CARDIAC. MITRAL VALVE SEVERE MR.  ORIGINATING AT A2 P2 REGION. LV FUNCTION NO RWMA, EF 50%  TRACE TR, TRACE PI.  NO PERICARDIAL EFFUSION. MV MEAN GRADIENT 3 MM F HG  FINDINGS DISCUSSED WITH DR STEWART  GUIDED IAS PUNCTURE. GUIDED SHEATH AND DEVISE ASSEMBLY PLACEMENT. GUIDED XTR MITRAL CLIP PLACEMENT IN A2 - P2 REGION  LEFT WITH MILD TO MODERATE RESIDUAL MR  TWO CHANNELS OF MR LATERAL A2- P1 BIGGER AND SMALLER MR A2- P3  IT WAS DECIDED TO GO AFTER LATERAL JET  GUIDED 2ND NPR CLIP PLACEMENT   DIFFICULTY CATCHING POSTERIOR LEAFLET AND DIFFICULTY WITHDRAWAL OF MITRAL CLIP  DURING THIS PART OF PROCEDURE PT. DEVELOPED PERICARDIAL EFFUSION  WHICH WAS GETTING GRADUALLY WORSE. GUIDED PERICARDIAL DRAIN PLACEMENT. PERICARDIAL DRAINAGE AND ADMINISTRATION OF WITHDRAVEN   BLOOD THROGH FEMORAL LINE. GUIDED 2ND CLIP PARTIAL PLACEMENT IN TO ANTERIOR LEAFLET  .   2 ND CLIP WAS STABLE IN LV SIDE IN TO LVOT  IATROGENIC ASD WITH LEFT TO RIGHT FLOW  PERICARDIAL EFFUSION STABILIZED  FINDINGS DISCUSSED WITH DR STEWART.

## 2020-11-03 NOTE — FLOWSHEET NOTE
Patient admitted to 2E09 via wheelchair  Patient NPO. Vital signs obtained. Assessment and data collection intiated. Oriented to room. Policies and procedures for 2E explained. All questions answered with no further questions at this time. Fall prevention and safety precautions discussed with patient.

## 2020-11-03 NOTE — PRE SEDATION
patient had no further questions and wished to proceed; the consent form was signed. MEDICAL HISTORY  [x]ASHD/ANGINA/MI/CHF   [x]Hypertension  [x]Diabetes  [x]Hyperlipidemia  []Smoking  []Family Hx of ASHD  [x]Additional information:       has a past medical history of CHARISSA (acute kidney injury) ([facility] [address] Arthritis, Blood circulation, collateral, CAD (coronary artery disease), Cardiomyopathy ([facility] [address] CHF with cardiomyopathy ([facility] [address] Chronic atrial fibrillation ([facility] [address] Chronic kidney disease, Congenital heart disease, Deafness congenital, ETOH abuse, ETOH abuse, GERD (gastroesophageal reflux disease), Hyperlipidemia, Hypertension, Medical non-compliance, Medtronic BiV ICD implanted 9/18/14 [facility], Medtronic BiV ICD-no atrial lead, and Severe malnutrition ([facility] [address] SURGICAL HISTORY   has a past surgical history that includes Pacemaker insertion (2005); Cardiac defibrillator placement (2005); Prostate surgery (2011); and pr foot/toes surgery proc unlisted (Right, 11/16/2018).   Additional information:       ALLERGIES   Allergies as of 11/03/2020    (No Known Allergies)     Additional information:       MEDICATIONS   Aspirin  [x] 81 mg  [] 325 mg  [] None  Antiplatelet drug therapy use last 5 days  [x]No []Yes  Coumadin Use Last 5 Days [x]No []Yes  Other anticoagulant use last 5 days  [x]No []Yes    Current Facility-Administered Medications:     0.9 % sodium chloride infusion, , Intravenous, Continuous, Michelle Ewing PA-C, Last Rate: 75 mL/hr at 11/03/20 0616    ceFAZolin (ANCEF) 2 g in dextrose 5 % 50 mL IVPB, 2 g, Intravenous, On Call to OR, Michelle Ewing PA-C    chlorhexidine (HIBICLENS) 4 % liquid, , Topical, Once, Michelle Ewing PA-C    sodium chloride flush 0.9 % injection 10 mL, 10 mL, Intravenous, 2 times per day, SHAKIR Woo-MACRINA    sodium chloride flush 0.9 % injection 10 mL, 10 mL, Intravenous, PRN, SHAKIR Woo-MACRINA    0.9 % sodium chloride infusion 250 mL, 250 mL, Intravenous, Once, Bright Woodall PA-C  Prior to Admission medications    Medication Sig Start Date End Date Taking? Authorizing Provider   vitamin B-1 (THIAMINE) 100 MG tablet Take 100 mg by mouth daily   Yes Historical Provider, MD   gabapentin (NEURONTIN) 100 MG capsule Take 100 mg by mouth 3 times daily. Yes Historical Provider, MD   sodium chloride 1 g tablet Take 1 tablet by mouth 3 times daily 9/21/20  Yes SUSAN Ospina CNP   bethanechol (URECHOLINE) 25 MG tablet Take 25 mg by mouth 3 times daily   Yes Historical Provider, MD   irbesartan (AVAPRO) 300 MG tablet TAKE 1 TABLET EVERY NIGHT 6/22/20  Yes Allyn Thompson MD   clopidogrel (PLAVIX) 75 MG tablet Take 1 tablet by mouth daily 9/10/19  Yes Allyn Thompson MD   metoprolol succinate (TOPROL XL) 50 MG extended release tablet TAKE 50 MG IN AM AND 25 IN PM. 7/25/19  Yes Allyn Thompson MD   folic acid (FOLVITE) 1 MG tablet Take 1 mg by mouth daily   Yes Historical Provider, MD   Multiple Vitamins-Minerals (THERAPEUTIC MULTIVITAMIN-MINERALS) tablet Take 1 tablet by mouth daily   Yes Historical Provider, MD   potassium chloride (KLOR-CON M) 10 MEQ extended release tablet Take 1 tablet by mouth daily 6/21/19  Yes Historical Provider, MD   tamsulosin (FLOMAX) 0.4 MG capsule Take 1 capsule by mouth daily 6/24/19  Yes SUSAN Ospina CNP   cetirizine (ZYRTEC) 10 MG tablet Take 10 mg by mouth daily   Yes Historical Provider, MD   atorvastatin (LIPITOR) 80 MG tablet Take 80 mg by mouth nightly   Yes Historical Provider, MD   aspirin 81 MG EC tablet Take 81 mg by mouth daily. Yes Historical Provider, MD   bumetanide (BUMEX) 2 MG tablet Take 1 tablet by mouth daily 9/21/20   SUSAN Ospina CNP   gabapentin (NEURONTIN) 100 MG capsule Take 1 capsule by mouth 3 times daily for 360 days.  3/28/19 9/21/20  Allyn Thompson MD   nitroGLYCERIN (NITROSTAT) 0.4 MG SL tablet PLACE 1 TABLET UNDER THE TONGUE EVERY 5 MINUTES AS NEEDED FOR CHEST PAIN UP TO MAX OF 3 TOTAL DOSES. IF NO RELIEF AFTER 1 DOSE, CALL 911. 12/24/18   Yanely Ashby PA-C     Additional information:       VITAL SIGNS   Vitals:    11/03/20 0533   BP: 134/87   Pulse: 60   Resp: 16   Temp: 97.6 °F (36.4 °C)   SpO2: 92%       PHYSICAL:   General: No acute distress  HEENT:  Unremarkable for age  Neck: without increased JVD, carotid pulses 2+ bilaterally without bruits  Heart: RRR, S1 & S2 WNL, S4 gallop, 3/6 HSM   NYHA: 3   Lungs: Clear to auscultation    Abdomen: BS present, without HSM, masses, or tenderness   Extremities: without C,C,E.  Pulses 2+ bilaterally  Mental Status: Alert & Oriented        PLANNED PROCEDURE   []Cath  []PCI                []Pacemaker/AICD  []VA             []Cardioversion []Peripheral angiography/PTA  [x]Other: MitraClip     SEDATION  Planned agent:[]Midazolam []Meperidine []Sublimaze []Morphine  []Diazepam  [x]Other: GA    ASA Classification:  []1 []2 [x]3 []4 []5  Class 1: A normal healthy patient  Class 2: Pt with mild to moderate systemic disease  Class 3: Severe systemic disease or disturbance  Class 4: Severe systemic disorders that are already life threatening. Class 5: Moribund pt with little chances of survival, for more than 24 hours. Mallampati I Airway Classification:   []1 [x]2 []3 []4    [x]Pre-procedure diagnostic studies complete and results available. Comment:    [x]Previous sedation/anesthesia experiences assessed. Comment:    [x]The patient is an appropriate candidate to undergo the planned procedure sedation and anesthesia. (Refer to nursing sedation/analgesia documentation record)  [x]Formulation and discussion of sedation/procedure plan, risks, and expectations with patient and/or responsible adult completed. [x]Patient examined immediately prior to the procedure.  (Refer to nursing sedation/analgesia documentation record)    Alec Hammans MD   Electronically signed 11/3/2020 at 6:32 AM

## 2020-11-03 NOTE — ANESTHESIA PROCEDURE NOTES
Arterial Line:    An arterial line was placed using surface landmarks, in the OR for the following indication(s): continuous blood pressure monitoring and blood sampling needed. A 20 gauge (size), 1 and 1/4 inch (length), Arrow (type) catheter was placed, Seldinger technique used, into the right radial artery, secured by . Anesthesia type: General    Events:  patient tolerated procedure well with no complications.   11/3/2020 7:34 AM11/3/2020 7:40 AM  Anesthesiologist: Daisy Holman MD  Performed: Anesthesiologist   Preanesthetic Checklist  Completed: patient identified, site marked, surgical consent, pre-op evaluation, timeout performed, IV checked, risks and benefits discussed, monitors and equipment checked, anesthesia consent given, oxygen available and patient being monitored

## 2020-11-03 NOTE — ANESTHESIA PRE PROCEDURE
9/21/20   SUSAN Ospina - CNP   gabapentin (NEURONTIN) 100 MG capsule Take 1 capsule by mouth 3 times daily for 360 days. 3/28/19 9/21/20  Jose Ramon Watkins MD   nitroGLYCERIN (NITROSTAT) 0.4 MG SL tablet PLACE 1 TABLET UNDER THE TONGUE EVERY 5 MINUTES AS NEEDED FOR CHEST PAIN UP TO MAX OF 3 TOTAL DOSES.  IF NO RELIEF AFTER 1 DOSE, CALL 911. 12/24/18   Taj Myaer PA-C       Current medications:    Current Facility-Administered Medications   Medication Dose Route Frequency Provider Last Rate Last Dose    0.9 % sodium chloride infusion   Intravenous Continuous Irvin Abts, PA-C 75 mL/hr at 11/03/20 0616      ceFAZolin (ANCEF) 2 g in dextrose 5 % 50 mL IVPB  2 g Intravenous On Call to [address]        chlorhexidine (HIBICLENS) 4 % liquid   Topical Once Irvin Abts, PA-C        sodium chloride flush 0.9 % injection 10 mL  10 mL Intravenous 2 times per day Irvin Abts, PA-C        sodium chloride flush 0.9 % injection 10 mL  10 mL Intravenous PRN Irvin Abts, PA-C           Allergies:  No Known Allergies    Problem List:    Patient Active Problem List   Diagnosis Code    Cardiomyopathy ([facility] I42.9    ETOH abuse F10.10    Deafness congenital H90.5    Medical non-compliance Z91.19    Medtronic BiV ICD-no atrial lead Z95.810    Ventricular fibrillation (HCC) I49.01    Hyponatremia E87.1    Inappropriate shocks from ICD (implantable cardioverter-defibrillator) T82.198A    ICD (implantable cardioverter-defibrillator) discharge P15.43    Metabolic acidosis F82.8    CHARISSA (acute kidney injury) ([facility] [facility] N17.9    CKD (chronic kidney disease) N18.9    Chronic cough R05    Essential hypertension I10    Nonrheumatic mitral valve regurgitation I34.0    Angina effort I20.8    Ischemic cardiomyopathy I25.5    CHF with cardiomyopathy (HCC) I50.9, I42.9    CRF (chronic renal failure), stage 3 (moderate) N18.30    Severe malnutrition ([facility] [address] [facility] E43    Acute on chronic systolic heart failure (HCC) I50.23    Acute mitral regurgitation I34.0       Past Medical History:        Diagnosis Date    CHARISSA [facility] kidney [facility]) ([facility] [facility]     Arthritis     general    Blood circulation, collateral     CAD (coronary artery disease)     Cardiomyopathy ([facility] [address] 8/13/2014    CHF with cardiomyopathy ([facility] [facility] 2/4/2019    Chronic atrial fibrillation ([facility] [facility] 8/13/2014    Chronic kidney disease     Congenital heart disease     Deafness congenital 8/13/2014    ETOH abuse 8/13/2014    ETOH abuse     GERD (gastroesophageal reflux disease)     Hyperlipidemia     Hypertension     Medical non-compliance 8/13/2014    Medtronic BiV ICD implanted 9/18/14 [facility] 10/8/2014    Medtronic BiV ICD-no atrial lead 10/8/2014    Severe malnutrition ([facility] [address]        Past Surgical History:        Procedure Laterality Date    CARDIAC DEFIBRILLATOR PLACEMENT  2005    PACEMAKER INSERTION  2005    SD FOOT/TOES SURGERY PROC UNLISTED Right 11/16/2018    RIGHT FOOT 1ST MTP JOINT ARTHRODESIS performed by Mega Anderson MD at [address] [place] [address]       Social History:    Social History     Tobacco Use    Smoking status: Never Smoker    Smokeless tobacco: Never Used   Substance Use Topics    Alcohol use:  Yes     Alcohol/week: 8.0 standard drinks     Types: 8 Cans of beer per week     Comment: 8 cans daily                                Counseling given: Not Answered      Vital Signs (Current):   Vitals:    11/03/20 0533   BP: 134/87   Pulse: 60   Resp: 16   Temp: 97.6 °F (36.4 °C)   TempSrc: Oral   SpO2: 92%   Weight: 158 lb (71.7 kg)   Height: 5' 6\" (1.676 m)                                              BP Readings from Last 3 Encounters:   11/03/20 134/87   10/27/20 128/78   09/21/20 126/82       NPO Status:                                                                                 BMI:   Wt Readings from Last 3 Encounters:   11/03/20 158 lb (71.7 kg)   10/27/20 163 lb 3.2 oz (74 kg) daughter through sign language  Impaired vision too  History of etoh  INTRA OP PROCEDURE GUIDANCE VA  RISKS BENEFITS AND ALTERNATES DISCUSSED. PT. CONSENTED  VA WAS REQUESTED BY DR TERRY REESE LINE DISCUSSED , CONSENTED  POSSIBLE POST OP VENT. SUPPORT DISCUSSED , CONSENTED)  Induction: intravenous. arterial line and VA  MIPS: Postoperative opioids intended, Prophylactic antiemetics administered and Postoperative trial extubation. Anesthetic plan and risks discussed with patient and child/children. Use of blood products discussed with patient and child/children whom consented to blood products. Plan discussed with CRNA.                   Luci Vizcarra MD   11/3/2020

## 2020-11-03 NOTE — BRIEF OP NOTE
[address] [address]  Sedation/Analgesia Post Sedation Record    Pt Name: Chelsie Allred  Account number: [de-identified]  MRN: [id number]  YOB: 1950  Procedure Performed By: Hayden Naranjo, [address]  Primary Care Physician: Jonathan Ball MD  Date: 11/3/2020    POST-PROCEDURE    Physicians/Assistants: MARS Orlando MD    Procedure Performed:MitraClip      Sedation/Anesthesia: Versed/ Fentanyl and 2% xylocaine local anesthesia. Estimated Blood Loss: < 50 ml. Specimens Removed: None         Disposition of Specimen: N/A        Complications: No Immediate Complications.        Post-procedure Diagnosis/Findings:     MitraClip XTR x 1  MitraClip NTR x 1  Pericardial effusion s/p drainage and autotransfusion  Drain in left in place               MARS Orlando MD  Electronically signed 11/3/2020 at 12:45 PM  Interventional Cardiology

## 2020-11-04 LAB
ANION GAP SERPL CALCULATED.3IONS-SCNC: 8 MEQ/L (ref 8–16)
BUN BLDV-MCNC: 14 MG/DL (ref 7–22)
CALCIUM SERPL-MCNC: 8.4 MG/DL (ref 8.5–10.5)
CHLORIDE BLD-SCNC: 106 MEQ/L (ref 98–111)
CO2: 16 MEQ/L (ref 23–33)
CREAT SERPL-MCNC: 1.6 MG/DL (ref 0.4–1.2)
ERYTHROCYTE [DISTWIDTH] IN BLOOD BY AUTOMATED COUNT: 14 % (ref 11.5–14.5)
ERYTHROCYTE [DISTWIDTH] IN BLOOD BY AUTOMATED COUNT: 51 FL (ref 35–45)
GFR SERPL CREATININE-BSD FRML MDRD: 43 ML/MIN/1.73M2
GLUCOSE BLD-MCNC: 130 MG/DL (ref 70–108)
HCT VFR BLD CALC: 35.6 % (ref 42–52)
HEMOGLOBIN: 11.7 GM/DL (ref 14–18)
LV EF: 58 %
LVEF MODALITY: NORMAL
MCH RBC QN AUTO: 33.1 PG (ref 26–33)
MCHC RBC AUTO-ENTMCNC: 32.9 GM/DL (ref 32.2–35.5)
MCV RBC AUTO: 100.8 FL (ref 80–94)
PLATELET # BLD: 267 THOU/MM3 (ref 130–400)
PMV BLD AUTO: 9.7 FL (ref 9.4–12.4)
POTASSIUM SERPL-SCNC: 5.4 MEQ/L (ref 3.5–5.2)
RBC # BLD: 3.53 MILL/MM3 (ref 4.7–6.1)
SODIUM BLD-SCNC: 130 MEQ/L (ref 135–145)
WBC # BLD: 14 THOU/MM3 (ref 4.8–10.8)

## 2020-11-04 PROCEDURE — APPSS30 APP SPLIT SHARED TIME 16-30 MINUTES: Performed by: PHYSICIAN ASSISTANT

## 2020-11-04 PROCEDURE — 80048 BASIC METABOLIC PNL TOTAL CA: CPT

## 2020-11-04 PROCEDURE — 36415 COLL VENOUS BLD VENIPUNCTURE: CPT

## 2020-11-04 PROCEDURE — 93306 TTE W/DOPPLER COMPLETE: CPT

## 2020-11-04 PROCEDURE — 6370000000 HC RX 637 (ALT 250 FOR IP): Performed by: INTERNAL MEDICINE

## 2020-11-04 PROCEDURE — 2140000000 HC CCU INTERMEDIATE R&B

## 2020-11-04 PROCEDURE — 85027 COMPLETE CBC AUTOMATED: CPT

## 2020-11-04 PROCEDURE — 2580000003 HC RX 258: Performed by: PHYSICIAN ASSISTANT

## 2020-11-04 PROCEDURE — 94761 N-INVAS EAR/PLS OXIMETRY MLT: CPT

## 2020-11-04 RX ORDER — IBUPROFEN 800 MG/1
800 TABLET ORAL ONCE
Status: COMPLETED | OUTPATIENT
Start: 2020-11-04 | End: 2020-11-04

## 2020-11-04 RX ORDER — PANTOPRAZOLE SODIUM 40 MG/1
40 TABLET, DELAYED RELEASE ORAL
Status: DISCONTINUED | OUTPATIENT
Start: 2020-11-05 | End: 2020-11-06 | Stop reason: HOSPADM

## 2020-11-04 RX ORDER — IBUPROFEN 800 MG/1
800 TABLET ORAL
Status: DISCONTINUED | OUTPATIENT
Start: 2020-11-04 | End: 2020-11-04

## 2020-11-04 RX ORDER — COLCHICINE 0.6 MG/1
0.6 TABLET ORAL 2 TIMES DAILY
Status: DISCONTINUED | OUTPATIENT
Start: 2020-11-04 | End: 2020-11-05

## 2020-11-04 RX ORDER — COLCHICINE 0.6 MG/1
0.6 TABLET ORAL 2 TIMES DAILY
Status: DISCONTINUED | OUTPATIENT
Start: 2020-11-05 | End: 2020-11-04

## 2020-11-04 RX ADMIN — DIPHENHYDRAMINE HCL 25 MG: 25 TABLET ORAL at 08:18

## 2020-11-04 RX ADMIN — SODIUM CHLORIDE: 9 INJECTION, SOLUTION INTRAVENOUS at 00:12

## 2020-11-04 RX ADMIN — HYDROCODONE BITARTRATE AND ACETAMINOPHEN 1 TABLET: 5; 325 TABLET ORAL at 00:17

## 2020-11-04 RX ADMIN — IBUPROFEN 800 MG: 800 TABLET, FILM COATED ORAL at 20:58

## 2020-11-04 RX ADMIN — SODIUM CHLORIDE: 9 INJECTION, SOLUTION INTRAVENOUS at 13:36

## 2020-11-04 ASSESSMENT — PAIN SCALES - GENERAL
PAINLEVEL_OUTOF10: 0
PAINLEVEL_OUTOF10: 2
PAINLEVEL_OUTOF10: 0
PAINLEVEL_OUTOF10: 0
PAINLEVEL_OUTOF10: 3
PAINLEVEL_OUTOF10: 0
PAINLEVEL_OUTOF10: 0

## 2020-11-04 ASSESSMENT — PAIN DESCRIPTION - PAIN TYPE
TYPE: SURGICAL PAIN

## 2020-11-04 ASSESSMENT — PAIN DESCRIPTION - LOCATION: LOCATION: CHEST

## 2020-11-04 ASSESSMENT — PAIN DESCRIPTION - ORIENTATION: ORIENTATION: MID

## 2020-11-04 NOTE — PROGRESS NOTES
Dr. Navin Day at bedside assessing pericardial drain, hand irrigated 75 ml of clear fluid from drain. Planning to keep drain in and monitor output further. Orders given for one time 800 mg ibuprofen and to hand aspirate drain at midnight to see if any further output. Orders also given for patient to be started on protonix PO for PPI prevention. Dr. Navin Day would like updated after ibuprofen administration if pain improves.

## 2020-11-04 NOTE — PROGRESS NOTES
36- Dr. Tequila Olivas notified of hypotension. At bedside. ECHO reviewed from this morning. Pericardial drainage reviewed. Labs reviewed. Ok to send to floor.

## 2020-11-04 NOTE — PROGRESS NOTES
Structural Heart/Cardiology Progress Note    2020 9:08 AM    Procedure:  MitraClip x 2        POD #:  1    Subjective: The pt is resting comfortably in bed, alert, and in no acute distress. Hemodynamically stable. NSR. The pt denies chest pressure, SOB, palpitations, fever, chills, N/V/D. Pericardial drain with 65 cc total.      Vital Signs: BP 90/70   Pulse 70   Temp 97.4 °F (36.3 °C) (Oral)   Resp 11   Ht 5' 6\" (1.676 m)   Wt 158 lb (71.7 kg)   SpO2 100%   BMI 25.50 kg/m²    Temp (24hrs), Av.6 °F (35.9 °C), Min:71.2 °F (21.8 °C), Max:98.3 °F (36.8 °C)    Labs:   CBC:  Recent Labs     2045 20  0545   WBC 5.7 12.0* 14.0*   HGB 11.4* 12.2* 11.7*   HCT 35.3* 37.6* 35.6*   .0* 102.7* 100.8*    270 267     BMP:  Recent Labs     20  0545 20  0545   * 135 130*   K 4.5 5.2 5.4*   CL 96* 106 106   CO2 21* 18* 16*   BUN 12 13 14   CREATININE 1.8* 1.6* 1.6*     PT/INR:   Recent Labs     20  0545   INR 1.19*     APTT:   Recent Labs     20  0545   APTT 31.2       Intake/Output Summary (Last 24 hours) at 2020 0908  Last data filed at 2020 8603  Gross per 24 hour   Intake 2850.59 ml   Output 2215 ml   Net 635.59 ml     Scheduled Meds:    chlorhexidine   Topical Once    sodium chloride flush  10 mL Intravenous 2 times per day     ROS: All neg unless specifically mentioned in subjective section. Exam:   General Appearance: alert ,conversing, in no acute distress  Cardiovascular: normal rate, regular rhythm, normal S1 and S2, no murmurs, rubs, clicks, or gallops  Pulmonary/Chest: clear to auscultation bilaterally- no wheezes, rales or rhonchi, normal air movement, no respiratory distress  Abdomen:soft, non-tender, non-distended, normal bowel sounds, no bruits,   Extremities: no cyanosis, clubbing or edema. Pulses: DP and PT doppler pulses bilaterally. No femoral bruits noted.   Skin: warm and dry, no rash or erythema  Head: normocephalic and atraumatic  Neck: supple and non-tender without mass, no thyromegaly, no JVD   Musculoskeletal: normal range of motion, no joint swelling, deformity or tenderness. Neurological: alert, oriented, normal speech, no focal findings or movement disorder noted  Groin: Wounds healing appropriately. No signs of infection or hematoma. Rt groin suture device removed with op site placed. Assessment:   Patient Active Problem List   Diagnosis    Cardiomyopathy ([facility] [address]    ETOH abuse    Deafness congenital    Medical non-compliance    Medtronic BiV ICD-no atrial lead    Ventricular fibrillation (HCC)    Hyponatremia    Inappropriate shocks from ICD (implantable cardioverter-defibrillator)    ICD (implantable cardioverter-defibrillator) discharge    Metabolic acidosis    CHARISSA (acute kidney injury) ([address]    CKD (chronic kidney disease)    Chronic cough    Essential hypertension    Nonrheumatic mitral valve regurgitation    Angina effort    Ischemic cardiomyopathy    CHF with cardiomyopathy ([facility] [address]    CRF (chronic renal failure), stage 3 (moderate)    Severe malnutrition (HCC)    Chronic systolic CHF (congestive heart failure), NYHA class 3 (HCC)    Acute mitral regurgitation    Severe mitral regurgitation   S/p MitraClip x 2 for mitral regurgitation    Plan:   1. Echo today to assess if pericardial drain can be removed and ASA/Plavix be restarted from home.   2. Transfer floors    The plan of care was discussed in detail with Dr. Lianne Carlson and Dr. Brian Anthony

## 2020-11-04 NOTE — FLOWSHEET NOTE
Pt and family declined prayerful visit.       11/04/20 1616   Encounter Summary   Services provided to: Patient and family together   Referral/Consult From: [facility] Family members   Continue Visiting No  (11/4 Declined. )   Complexity of Encounter Low   Length of Encounter 15 minutes   Routine   Type Initial   Outcome Refused/declined

## 2020-11-04 NOTE — CARE COORDINATION
11/4/20, 8:46 AM EST  DISCHARGE PLANNING EVALUATION:    Dmitriy Harris       Admitted from: 2E 11/3/2020/ [address] day: 1   Location: [address] [place] [address]- Reason for admit: Acute mitral regurgitation [I34.0] Status: IP  Admit order signed?: yes  PMH:  has a past medical history of CHARISSA (acute kidney injury) ([address] Arthritis, Blood circulation, collateral, CAD (coronary artery disease), Cardiomyopathy ([address] CHF with cardiomyopathy ([address] Chronic atrial fibrillation ([address] Chronic kidney disease, Congenital heart disease, Deafness congenital, ETOH abuse, ETOH abuse, GERD (gastroesophageal reflux disease), Hyperlipidemia, Hypertension, Medical non-compliance, Medtronic BiV ICD implanted 9/18/14 [facility], Medtronic BiV ICD-no atrial lead, and Severe malnutrition ([facility] [address] Procedure:   11/3 Mitraclip x2; pericardial effusion with drainage and autotransfusion; drain left in place  Pertinent abnormal Imaging:none  Medications:  Scheduled Meds:   chlorhexidine   Topical Once    sodium chloride flush  10 mL Intravenous 2 times per day     Continuous Infusions:   sodium chloride 75 mL/hr at 11/04/20 0012      Pertinent Info/Orders/Treatment Plan: ICU post-cath for mitraclip - see note above; patient had pericardial effusion drained and pericardial drain was left in place. Afebrile. On room air. Vpaced. Ox4. Right femoral puncture site WNL. Echo ordered. Plan to remove tala and transfer to 3B stepdown. Telemetry, drain care, elias care, up with assist. IVF, prn benadryl, prn norco. Received 250 ml fld bolus x1. IV ATB completed. Na+ 130, K+ 4.5 - now 5.4, CO2 21 - now 16, Creat 1.8 - now 1.6, pro-bnp 2159, wbc 5.7 - now 14, hgb 11.4 - now 11.7, INR 1. 19. Diet: Diet NPO Time Specified   Smoking status:  reports that he has never smoked.  He has never used smokeless tobacco.   PCP: Daniel Rodriguez MD  Readmission 30 days or less: no  Readmission Risk Score: 13%    Discharge Planning Evaluation  Current Residence:  Private

## 2020-11-04 NOTE — PROGRESS NOTES
Report called to [facility] on 3B. All questions answered. Waiting on bed to be cleaned.

## 2020-11-05 LAB
ANION GAP SERPL CALCULATED.3IONS-SCNC: 10 MEQ/L (ref 8–16)
BUN BLDV-MCNC: 16 MG/DL (ref 7–22)
CALCIUM SERPL-MCNC: 8.7 MG/DL (ref 8.5–10.5)
CHLORIDE BLD-SCNC: 104 MEQ/L (ref 98–111)
CO2: 17 MEQ/L (ref 23–33)
CREAT SERPL-MCNC: 1.6 MG/DL (ref 0.4–1.2)
ERYTHROCYTE [DISTWIDTH] IN BLOOD BY AUTOMATED COUNT: 14.1 % (ref 11.5–14.5)
ERYTHROCYTE [DISTWIDTH] IN BLOOD BY AUTOMATED COUNT: 53.9 FL (ref 35–45)
GFR SERPL CREATININE-BSD FRML MDRD: 43 ML/MIN/1.73M2
GLUCOSE BLD-MCNC: 100 MG/DL (ref 70–108)
HCT VFR BLD CALC: 32.8 % (ref 42–52)
HEMOGLOBIN: 10.6 GM/DL (ref 14–18)
MCH RBC QN AUTO: 33.4 PG (ref 26–33)
MCHC RBC AUTO-ENTMCNC: 32.3 GM/DL (ref 32.2–35.5)
MCV RBC AUTO: 103.5 FL (ref 80–94)
PLATELET # BLD: 190 THOU/MM3 (ref 130–400)
PMV BLD AUTO: 9.8 FL (ref 9.4–12.4)
POTASSIUM SERPL-SCNC: 4.5 MEQ/L (ref 3.5–5.2)
RBC # BLD: 3.17 MILL/MM3 (ref 4.7–6.1)
SODIUM BLD-SCNC: 131 MEQ/L (ref 135–145)
WBC # BLD: 8.7 THOU/MM3 (ref 4.8–10.8)

## 2020-11-05 PROCEDURE — 99222 1ST HOSP IP/OBS MODERATE 55: CPT | Performed by: INTERNAL MEDICINE

## 2020-11-05 PROCEDURE — 2580000003 HC RX 258: Performed by: PHYSICIAN ASSISTANT

## 2020-11-05 PROCEDURE — 6370000000 HC RX 637 (ALT 250 FOR IP): Performed by: INTERNAL MEDICINE

## 2020-11-05 PROCEDURE — 2140000000 HC CCU INTERMEDIATE R&B

## 2020-11-05 PROCEDURE — APPSS30 APP SPLIT SHARED TIME 16-30 MINUTES: Performed by: PHYSICIAN ASSISTANT

## 2020-11-05 PROCEDURE — 36415 COLL VENOUS BLD VENIPUNCTURE: CPT

## 2020-11-05 PROCEDURE — 85027 COMPLETE CBC AUTOMATED: CPT

## 2020-11-05 PROCEDURE — 2580000003 HC RX 258: Performed by: INTERNAL MEDICINE

## 2020-11-05 PROCEDURE — 80048 BASIC METABOLIC PNL TOTAL CA: CPT

## 2020-11-05 RX ORDER — COLCHICINE 0.6 MG/1
0.3 TABLET ORAL 2 TIMES DAILY
Status: DISCONTINUED | OUTPATIENT
Start: 2020-11-05 | End: 2020-11-06 | Stop reason: HOSPADM

## 2020-11-05 RX ADMIN — PANTOPRAZOLE SODIUM 40 MG: 40 TABLET, DELAYED RELEASE ORAL at 06:29

## 2020-11-05 RX ADMIN — COLCHICINE 0.3 MG: 0.6 TABLET, FILM COATED ORAL at 20:36

## 2020-11-05 RX ADMIN — SODIUM CHLORIDE: 9 INJECTION, SOLUTION INTRAVENOUS at 20:36

## 2020-11-05 RX ADMIN — SODIUM CHLORIDE: 9 INJECTION, SOLUTION INTRAVENOUS at 04:06

## 2020-11-05 RX ADMIN — COLCHICINE 0.6 MG: 0.6 TABLET, FILM COATED ORAL at 09:14

## 2020-11-05 RX ADMIN — COLCHICINE 0.6 MG: 0.6 TABLET, FILM COATED ORAL at 01:00

## 2020-11-05 ASSESSMENT — PAIN SCALES - GENERAL
PAINLEVEL_OUTOF10: 0

## 2020-11-05 NOTE — CONSULTS
[facility]    [address], [address]  [facility]  11/5/2020 9:38 AM    Pt Name:    Claudene Nanny  MRN:     [id number]   [id number]  YOB: 1950  Admit Date:    11/3/2020  5:08 AM  Primary Care Physician:  Daniel Rodriguez MD    CSN Number:   [id number]    Reason for Consult:  CKD, need for colchicine and ibuprofen  Requesting provider:   Dr. Dannielle Forrest    History:   The patient is a 79 y.o. with history of hyponatremia, CAD, stents, HFrEF, CKD, atrial fibrillation, chronic alcohol abuse, noncompliance with follow-ups. Patient is deaf and mute. Patient was last seen by us back in July 2019. At that time his sodium was running low but patient was not willing to make any adjustments and also was not willing to reduce alcohol intake. Anyway patient has been following with cardiology. He did to reduce his alcohol intake over last year. Patient had been short of breath with minimal exertion. He also had swollen legs and arms. Recently had left and right heart cath. Cath showed nonobstructive CAD and coronary arteriogram showed LV dysfunction with mitral valve regurgitation. Patient was brought in and underwent mitral valve clipping procedure on Nov 3rd. Patient also underwent pericardial drain placement. Nephrology has been requested to see him in consultation to render an opinion regarding safety of colchicine and ibuprofen use for short-term 2 to 4 weeks. Patient seen and examined. Son communicated via sign language. At this time patient denies any chest pain or any shortness of breath. Denies any nausea, vomiting. Denies any fever or chills.     Past Medical History:  Past Medical History:   Diagnosis Date    CHARISSA (acute kidney injury) ([address]     Arthritis     general    Blood circulation, collateral     CAD (coronary artery disease)     Cardiomyopathy ([address] 8/13/2014    CHF with cardiomyopathy ([address] 2/4/2019    Chronic atrial fibrillation ([facility] 8/13/2014    Chronic kidney disease     Congenital heart disease     Deafness congenital 8/13/2014    ETOH abuse 8/13/2014    ETOH abuse     GERD (gastroesophageal reflux disease)     Hyperlipidemia     Hypertension     Medical non-compliance 8/13/2014    Medtronic BiV ICD implanted 9/18/14 [facility] 10/8/2014    Medtronic BiV ICD-no atrial lead 10/8/2014    Severe malnutrition ([facility] [address]        Past Surgical History:  Past Surgical History:   Procedure Laterality Date    CARDIAC DEFIBRILLATOR PLACEMENT  2005    PACEMAKER INSERTION  2005    MA FOOT/TOES SURGERY PROC UNLISTED Right 11/16/2018    RIGHT FOOT 1ST MTP JOINT ARTHRODESIS performed by King Natividad MD at [address] SURGERY  2011    PVP       Family History:  Family History   Problem Relation Age of Onset    Diabetes Mother     Arthritis Mother     Heart Disease Father     High Blood Pressure Father     High Cholesterol Father     Arthritis Father        Social History:  Social History     Socioeconomic History    Marital status:      Spouse name: Hardik Mckoy Number of children: 3    Years of education: Not on file    Highest education level: Not on file   Occupational History    Not on file   Social Needs    Financial resource strain: Not on file    Food insecurity     Worry: Not on file     Inability: Not on file   [employer] needs     Medical: Not on file     Non-medical: Not on file   Tobacco Use    Smoking status: Never Smoker    Smokeless tobacco: Never Used   Substance and Sexual Activity    Alcohol use:  Yes     Alcohol/week: 8.0 standard drinks     Types: 8 Cans of beer per week     Comment: 8 cans daily    Drug use: No    Sexual activity: Not on file   Lifestyle    Physical activity     Days per week: Not on file     Minutes per session: Not on file    Stress: Not on file   Relationships    Social connections     Talks on phone: Not on file     Gets together: Not on file     Attends Congregational service: Not on file     Active member of club or organization: Not on file     Attends meetings of clubs or organizations: Not on file     Relationship status: Not on file    Intimate partner violence     Fear of current or ex partner: Not on file     Emotionally abused: Not on file     Physically abused: Not on file     Forced sexual activity: Not on file   Other Topics Concern    Not on file   Social History Narrative    Not on file       Home Meds:  Prior to Admission medications    Medication Sig Start Date End Date Taking? Authorizing Provider   vitamin B-1 (THIAMINE) 100 MG tablet Take 100 mg by mouth daily   Yes Historical Provider, MD   gabapentin (NEURONTIN) 100 MG capsule Take 100 mg by mouth 3 times daily.    Yes Historical Provider, MD   sodium chloride 1 g tablet Take 1 tablet by mouth 3 times daily 9/21/20  Yes SUSAN Ospina CNP   bethanechol (URECHOLINE) 25 MG tablet Take 25 mg by mouth 3 times daily   Yes Historical Provider, MD   irbesartan (AVAPRO) 300 MG tablet TAKE 1 TABLET EVERY NIGHT 6/22/20  Yes Bret Arenas MD   clopidogrel (PLAVIX) 75 MG tablet Take 1 tablet by mouth daily 9/10/19  Yes Bret Arenas MD   metoprolol succinate (TOPROL XL) 50 MG extended release tablet TAKE 50 MG IN AM AND 25 IN PM. 7/25/19  Yes Bret Arenas MD   folic acid (FOLVITE) 1 MG tablet Take 1 mg by mouth daily   Yes Historical Provider, MD   Multiple Vitamins-Minerals (THERAPEUTIC MULTIVITAMIN-MINERALS) tablet Take 1 tablet by mouth daily   Yes Historical Provider, MD   potassium chloride (KLOR-CON M) 10 MEQ extended release tablet Take 1 tablet by mouth daily 6/21/19  Yes Historical Provider, MD   tamsulosin (FLOMAX) 0.4 MG capsule Take 1 capsule by mouth daily 6/24/19  Yes SUSAN Ospina CNP   cetirizine (ZYRTEC) 10 MG tablet Take 10 mg by mouth daily   Yes Historical Provider, MD   atorvastatin (LIPITOR) 80 MG tablet Take 80 mg by mouth nightly   Yes Historical Provider, MD   aspirin 81 MG EC tablet Take 81 mg by mouth daily. Yes Historical Provider, MD   bumetanide (BUMEX) 2 MG tablet Take 1 tablet by mouth daily 9/21/20   Le Cadena APRN - CNP   gabapentin (NEURONTIN) 100 MG capsule Take 1 capsule by mouth 3 times daily for 360 days. 3/28/19 9/21/20  Jose Ramon Ospina MD   nitroGLYCERIN (NITROSTAT) 0.4 MG SL tablet PLACE 1 TABLET UNDER THE TONGUE EVERY 5 MINUTES AS NEEDED FOR CHEST PAIN UP TO MAX OF 3 TOTAL DOSES. IF NO RELIEF AFTER 1 DOSE, CALL 911. 12/24/18   Claudia Ruvalcaba PA-C       Review of Systems:  Constitutional: Negative for fever, fatigue or chills. Reports good appetite  Head: Negative for headaches  Eyes: Negative for blurry vision or discharge  Ears: Negative for ear pain or hearing changes  Nose: Negative for runny nose or epistaxis  Respiratory: Negative for shortness of breath. Negative for cough or sputum production. Negative for hemoptysis  Cardiovascular: Negative for chest pain  GI: Negative for nausea, vomiting and diarrhea. Negative for hematochezia and melena  : Negative for discharge, dysuria, or hematuria  Skin: Negative for rash  Musculoskeletal: Negative for joint pain, moves all ext  Neuro: Negative for numbness or tingling, negative for slurred speech  Psychiatric: Reports stable mood, negative for depression or insomnia    All other review of systems were reviewed and negative    Current Meds:  Infusion:    sodium chloride 75 mL/hr at 11/05/20 0406     Meds:    pantoprazole  40 mg Oral QAM AC    colchicine  0.6 mg Oral BID    chlorhexidine   Topical Once    sodium chloride flush  10 mL Intravenous 2 times per day     Meds prn: sodium chloride flush, labetalol, HYDROcodone 5 mg - acetaminophen, ALPRAZolam, diphenhydrAMINE     Allergies/Intolerances: ALLERGIES: Patient has no known allergies.     24HR INTAKE/OUTPUT:      Intake/Output Summary (Last 24 hours) at 11/5/2020 4120  Last data filed at 11/5/2020 7346  Gross per 24 hour   Intake 2801.2 ml   Output 915 ml   Net 1886.2 ml     I/O last 3 completed shifts: In: 2651.2 [P.O.:1140; I.V.:1511.2]  Out: 915 [Urine:750; Drains:165]  I/O this shift:  In: 150 [P.O.:150]  Out: -   Admission weight: 158 lb (71.7 kg)  Wt Readings from Last 3 Encounters:   11/05/20 174 lb 3.2 oz (79 kg)   10/27/20 163 lb 3.2 oz (74 kg)   09/21/20 175 lb (79.4 kg)     Body mass index is 28.12 kg/m². Physical Examination:  VITALS:   Vitals:    11/04/20 2245 11/05/20 0400 11/05/20 0639 11/05/20 0745   BP: 93/60 99/62  (!) 92/58   Pulse: 71 72  72   Resp: 14 16  16   Temp: 98.2 °F (36.8 °C) 99.3 °F (37.4 °C)  97.8 °F (36.6 °C)   TempSrc: Oral Oral  Oral   SpO2: 92% 92%  91%   Weight:   174 lb 3.2 oz (79 kg)    Height:         Weight:   Wt Readings from Last 3 Encounters:   11/05/20 174 lb 3.2 oz (79 kg)   10/27/20 163 lb 3.2 oz (74 kg)   09/21/20 175 lb (79.4 kg)     Constitutional and General Appearance: alert and cooperative with exam, appears comfortable, no distress, not diaphoretic  Eyes: no icteric sclera in left eye or right eye,  no pallor conjunctiva in left or right eye, no discharge seen from left eye or right eye  Ears and Nose: normal external appearance of left and right ear. No active drainage from nose. Oral: moist oral mucus membranes  Neck: No jugular venous distention  Lungs: Air entry B/L, no rales, no use of accessory muscles or labored breathing  Heart: S1, S2  Extremities: no sig LE edema, no tenderness  GI: soft, non-tender, no guarding, no distention  Skin: warm to touch  Musculo: moves all extremities, no clubbing or cyanosis of digits of either upper extremity.    Neuro: no slurred speech, no facial drooping    Lab Data  CBC:   Recent Labs     11/03/20 1858 11/04/20  0545 11/05/20  0354   WBC 12.0* 14.0* 8.7   HGB 12.2* 11.7* 10.6*   HCT 37.6* 35.6* 32.8*    267 190     BMP:  Recent Labs     11/03/20 1858 11/04/20  0545 11/05/20  0354    130* 131*   K 5.2 5.4* 4.5    106 104   CO2 18* 16* 17*   BUN 13 14 16   CREATININE 1.6* 1.6* 1.6*   GLUCOSE 177* 130* 100   CALCIUM 8.7 8.4* 8.7     Hepatic:   Recent Labs     11/03/20  0545   LABALBU 3.6   AST 13   ALT 8*   BILITOT 0.3   ALKPHOS 120       Additional Labs: proBNP 2159  EF 55 to 60%, small pericardial effusion    Old labs and diagnostics reviewed. Sodium level between 122-130    Impression and Plan:  1. Chronic hyponatremia in setting of chronic alcohol use and also recent underlying congestive heart failure. Overall sodium levels are stable. Patient was not compliant with follow-ups. Sodium currently stable around 130-135. Will place him on 1800 mL fluid restricted diet. Reduce IV fluid rate. 2. CKD III: Overall stable at baseline. Discussed with the patient and son at bedside. Discussed chronic side effects of nonsteroidals. However discussed indication in context of current medical condition. Patient agreed. Okay to use colchicine and ibuprofen for short-term use. However will need to reduce dose of colchicine to 0.3 mg twice a day. 3. Recent mild hyperkalemia. Resolved. Will monitor  4. Low serum bicarbonate. Check venous pH in the morning. ? Metabolic acidosis vs underlying respiratory alkalosis with compensation. 5. Status post mitral clipping  6. Nonobstructive CAD  7. Pericardial effusion status post drain  8. History of chronic alcohol use    Thank you for the consult. Please feel free to call me if you have any questions.    Discussed with patient and son    Olesya Vinson MD  [facility]

## 2020-11-05 NOTE — PROGRESS NOTES
Pt awake and oriented X4. Son Lauren Hurt in room, pt is deaf and mute. Upper extremities are warm, pink and dry. Capillary refill and skin turgor both less than 3 seconds. Heart sounds ausculated at all 4 points. Lung sounds strong and equal bilaterally with symmetrical moderate chest expansion. Bowel sounds heard every 5-15 seconds in all four quads. No masses palpatated. Lower extremities are warm, pink, dry. Bed in lowest position, bed alarm on. Call light and table in reach. Belle Banegas SN/RSC.

## 2020-11-05 NOTE — PROGRESS NOTES
Pt awake in bed, son at bedside. Heart sounds ausculted at all four points. Lung sounds clear and equal bilaterally with symmetrical moderate chest expansion. Bed alarm on, bed in lowest position, call light and table in reach. Mario ARENAS/RSC.

## 2020-11-05 NOTE — PROGRESS NOTES
Pt awake in bed, son at bedside. Dorsal pedis and posterior tibialis not palpable, but found with the doppler. Bed alarm on, bed in lowest position. Call light and table in reach.  Daryl ARENAS/RSC

## 2020-11-05 NOTE — CARE COORDINATION
11/5/20, 2:30 PM EST    DISCHARGE ON GOING EVALUATION    Dmitriy DALY [facility] day: 2  Location: [facility]24/024-A Reason for admit: Acute mitral regurgitation [I34.0]   Procedure: 11/4 TAVR  Treatment Plan of Care: Interventional cardiology and nephro following. Na+ 131. IVF. Colchicine. Pain control. Barriers to Discharge: Await medical clearance   PCP: Stuart Trivedi MD  Readmission Risk Score: 12%  Patient Goals/Plan/Treatment Preferences: Plans to return home with his wife and son. Denied needs.

## 2020-11-05 NOTE — PROGRESS NOTES
pulses bilaterally. No femoral bruits noted. Skin: warm and dry, no rash or erythema  Head: normocephalic and atraumatic  Neck: supple and non-tender without mass, no thyromegaly, no JVD   Musculoskeletal: normal range of motion, no joint swelling, deformity or tenderness. Neurological: alert, oriented, normal speech, no focal findings or movement disorder noted  Groin: Wounds healing appropriately. No signs of infection or hematoma. Rt groin suture device removed with op site placed.     Assessment:   Patient Active Problem List   Diagnosis    Cardiomyopathy ([facility]    ETOH abuse    Deafness congenital    Medical non-compliance    Medtronic BiV ICD-no atrial lead    Ventricular fibrillation (HCC)    Hyponatremia    Inappropriate shocks from ICD (implantable cardioverter-defibrillator)    ICD (implantable cardioverter-defibrillator) discharge    Metabolic acidosis    CHARISSA (acute kidney injury) ([facility]    CKD (chronic kidney disease)    Chronic cough    Essential hypertension    Nonrheumatic mitral valve regurgitation    Angina effort    Ischemic cardiomyopathy    CHF with cardiomyopathy ([facility]    CRF (chronic renal failure), stage 3 (moderate)    Severe malnutrition (HCC)    Chronic systolic CHF (congestive heart failure), NYHA class 3 (HCC)    Acute mitral regurgitation    Severe mitral regurgitation   S/p MitraClip x 2 for mitral regurgitation    Plan:   1. Echo later today - keep pericardial drain    The plan of care was discussed in detail with Dr. Jimmy Vizcaino and Dr. Tinajero Haitian

## 2020-11-05 NOTE — PROCEDURES
[address] [place] [address]                            CARDIAC CATHETERIZATION    PATIENT NAME: Ellin Galeazzi                  :        1950  MED REC NO:   [id number]                           ROOM:       0024  ACCOUNT NO:   [de-identified]                           ADMIT DATE: 2020  PROVIDER:     Cheryle Mayer, MD    DATE OF PROCEDURE:  2020    PROCEDURE:  Transcatheter mitral valve repair (MitraClip). INDICATION:  Severe symptomatic degenerative mitral regurgitation, NYHA  class III heart failure, not a candidate for open heart surgery. :  Cheryle Mayer, MD    VA/ANESTHESIA:  Armani Patterson MD    DESCRIPTION OF PROCEDURE:  After informed consent was obtained from the  patient, she was brought to the hybrid operating room and prepped in  sterile fashion. The patient was anesthetized and intubated per Dr. Jose Capone. Please see his note for further details. After the VA probe  was inserted, imaging was performed. No left atrial or left atrial  appendage thrombus was noted. Mitral valve was reevaluated,  demonstrating severe degenerative mitral regurgitation at the level of  A2/P2 with slightly medial jet at A3/P3. Ejection fraction was within  normal limits. Trivial baseline pericardial effusion noted. Likely  common anterior fat pad, but may also be echogenic pericardial effusion  in front of the right ventricle. Thereafter, I proceeded with access. After infiltration of the right inguinal region with 2% lidocaine using  micropuncture, modified Seldinger technique, fluoroscopic guidance and  ultrasound guidance, I inserted an 18-[place] sheath into the right  femoral vein. 2000 units of IV heparin were given. I used a standard  SL1 sheath and [employer] transseptal needle, that was inserted into the SVC  over a 0.032 guidewire.   Then using a VA guidance, I performed a  superior and posterior transseptal puncture where the height was  confirmed to be approximately 4.5 to 5 cm above the low annular plane. Once the puncture was confirmed, heparin IV was given. ACT was  confirmed to be above 250 seconds. We then prepped a standard guiding  catheter per 's recommendations. I inserted the [place]  transseptal wire into the left atrium. I removed the SL1 sheath and the  18-[place] sheath. I then inserted the SVC into the patient with  negative on the guide. I crossed the interatrial septum using VA  guidance with SGC. Once the SCG was in place, the dilator and wire were  removed under negative aspiration. Based on pre-procedure imaging,  MitraClip XTR clip was prepped per 's recommendation,  ensuring that we appropriately keyed the device. Once the device was in  the appropriate position, then started the device on fluoroscopy and VA  guided. Then using medial deflection and posterior tilt, I was able to  move down to the mitral valve without any issues. Clip was opened to  120 degrees. Using 3D guidance, I then lined up the clip arms and  dissected the jet. Once we were in position, I closed the clip arm and  I advanced it down under the mitral valve annulus in the grasping view. I then opened the clip arms. I then slowly and steadily withdrew the  clip. Once we were in position, we confirmed on the 3D and 2D imaging,  we grasped the leaflets and dropped the grippers. I closed the clips to  60 degrees. We then assessed the mitral valve annulus. I did not like  the grasp, I reopened the clips and then moved the clip slightly medial  and re-grasped valves. I then closed the clips in 60 degrees. This  grasp showed that there was appropriate position of A2/P2 with 2 mm mean  gradient and I was convinced that the anterior and posterior leaflets  were definitely within the clips. Therefore, I elected to proceed with  leaflet clip.   I locked the clip and closed the clip completely. I  tried to open the clip, it did not open. I turned the flushes up,  sloughed gripper line, I removed the lock line and tried to open the  clip once more, it did not open. Then I removed the pin, turned the  actuator knob to expose the groove and then turned the clip knob eight  times counter-clockwise and released the clip completely. Thereafter,  gripper line was removed slowly and steadily. Once we were completely  free, all of the moves were reversed and VA and then the clips were  completely withdrawn from the patient under negative aspiration. VA  was then re-performed, demonstrating that there was at least a moderate  jet lateral to the first clip placed. We likely dissected the large jet  into a secondary jet. Once that was confirmed, I elected to proceed  with the second NTR clip placement. This was prepped per manufacture's  recommendations. I inserted this under wet-to-wet connection,  appropriately keying the device. Once we were inside the SVC, I then  elected to settle the clip under fluoroscopy. At this time, using  fluoroscopy, 2D and 3D VA and using medial and posterior tilt, I  ensured that I was parallel to the first clip on fluoroscopy and I was  able to dock to the mitral valve annulus. I then opened the clip arms  using 3D guidance, I lined up the clip arms in the appropriate position. I then closed the clips and passed it down into the mitral valve annulus  in the grasping view. I then open the arms to about 120 degrees. I  then attempted to grasp the anterior and posterior leaflets. We were  not able to see clearly due to the first clip shadowing, therefore we  changed to a 0 degree view and we were able to see both arms completely. Thereafter, I attempted to grasp the leaflets, but I could not grasp  posterior leaflet. I clocked the [address] to get more posterior, but I still  could not grasp the posterior leaflet appropriately.   At this time, I  elected to grasp the leaflets, I could not move the clips further. It  appeared to be dangled and tangled in a cord. I therefore tried to  reverse all motions to the clip, but I was unable to remove the clip  from the patient. I also was unable to grab the posterior leaflet. We  clearly had the anterior leaflets within the clip arms, but the  posterior leaflet was not able to be grasped. At that point, we noted  that the pericardium was developing a slightly large effusion. Anesthesia notified me that the pressure was slightly down more so than  previously. Imaging had become suboptimal concerning to the fact that  the effusion may be obstructing the view of the probe. Given that the  blood pressure was dropping, I elected to obtain a pericardial access  and drain the pericardium prior to proceeding to ensure that the patient  remains stable. The LVEF and RV function were relatively preserved and  the pressure was minimally low, at around 80 systolic, but I could not  be sure that this was not related to anesthesia versus progressive  effusion and concern for tamponade. Therefore the substernal region was  prepped sterilely. I then used pericardiocentesis kit, a 0.035 access  needle and under fluoroscopic VA guidance, I advanced the access needle  into the pericardium under negative aspiration. A 0.035 wire was then  inserted into the pericardium without complication. I then advanced the  dilator of the pericardial drain into the pericardium and did a bubble  study and confirmed that I was in the pericardium. I then exchanged out  for the 8.5 [place] pigtail drain over 0.035 75 cm Amplatz wire. I  drained 180 mL of what appeared to be hemorrhagic effusion and I at the  same time had gained right femoral vein access while they were prepping  the substernal region with a 6-Macanese sheath and a micropuncture kit.    Given that I had this access, I used 180 mL of blood taken from the  pericardium, was anticoagulated was  interrogated, demonstrating small left-to-right shunts. The patient was  not hypoxic. The patient was not on any pressors at the end of the  case. Given these findings, no further intervention was undertaken at  this point. SGCs were removed and a single Perclose was completed and  then a modified figure-of-eight stitch was used for  secondary hemostasis. A FemoStop will be used for tertiary hemostasis. Left femoral venous access was left in place. Arterial access was left  in place. The patient was extubated per Anesthesia protocol. The  pericardial drain was left in place for overnight monitoring. IMMEDIATE COMPLICATIONS:  None. ESTIMATED BLOOD LOSS:  Less than 50 mL. MEDICATIONS:  See EMR. SUMMARY:  MitraClip XTR x1 at A2/P2 and MitraClip NTR x1 in the region  of A1/P1 with chordal attachment; pericardial effusion treated with  pericardiocentesis with no residual bleeding noted. PLAN:  1. Bedrest.  2.  Optimal medical therapy. 3.  Risk factor management. 4.  ICU care. 5.  Monitor drain output if more than 50 mL, remove the drain once it is  less than 50 mL for 24 hours. 6.  TTE in the morning. 7.  Guideline directed therapy for heart failure. 8.  Aspirin/Plavix once we know that the pericardial status is stable. 9.  Restart home meds as tolerated. 10.  Gentle IV fluids. 11.  Cardiac rehab. 12.  Follow up in one week postprocedure. All the above explained to the patient and the patient's family. They  are agreeable and amenable to plan.         Dannielle Hicks MD    D: 11/05/2020 6:10:37       T: 11/05/2020 10:09:52     [email or web address]  Job#: [id number]     Doc#: [id number]    CC:

## 2020-11-06 ENCOUNTER — TELEPHONE (OUTPATIENT)
Dept: CARDIOLOGY CLINIC | Age: 70
End: 2020-11-06

## 2020-11-06 VITALS
WEIGHT: 168.1 LBS | RESPIRATION RATE: 16 BRPM | TEMPERATURE: 98.5 F | HEART RATE: 70 BPM | DIASTOLIC BLOOD PRESSURE: 75 MMHG | OXYGEN SATURATION: 92 % | SYSTOLIC BLOOD PRESSURE: 125 MMHG | BODY MASS INDEX: 27.02 KG/M2 | HEIGHT: 66 IN

## 2020-11-06 LAB
ANION GAP SERPL CALCULATED.3IONS-SCNC: 11 MEQ/L (ref 8–16)
BUN BLDV-MCNC: 15 MG/DL (ref 7–22)
CALCIUM SERPL-MCNC: 9.1 MG/DL (ref 8.5–10.5)
CHLORIDE BLD-SCNC: 107 MEQ/L (ref 98–111)
CO2: 18 MEQ/L (ref 23–33)
CREAT SERPL-MCNC: 1.6 MG/DL (ref 0.4–1.2)
GFR SERPL CREATININE-BSD FRML MDRD: 43 ML/MIN/1.73M2
GLUCOSE BLD-MCNC: 113 MG/DL (ref 70–108)
PH VENOUS: 7.36 (ref 7.31–7.41)
POTASSIUM SERPL-SCNC: 4.7 MEQ/L (ref 3.5–5.2)
SODIUM BLD-SCNC: 136 MEQ/L (ref 135–145)

## 2020-11-06 PROCEDURE — 36415 COLL VENOUS BLD VENIPUNCTURE: CPT

## 2020-11-06 PROCEDURE — APPSS60 APP SPLIT SHARED TIME 46-60 MINUTES: Performed by: PHYSICIAN ASSISTANT

## 2020-11-06 PROCEDURE — 99232 SBSQ HOSP IP/OBS MODERATE 35: CPT | Performed by: INTERNAL MEDICINE

## 2020-11-06 PROCEDURE — 82800 BLOOD PH: CPT

## 2020-11-06 PROCEDURE — 80048 BASIC METABOLIC PNL TOTAL CA: CPT

## 2020-11-06 PROCEDURE — 6370000000 HC RX 637 (ALT 250 FOR IP): Performed by: INTERNAL MEDICINE

## 2020-11-06 PROCEDURE — 93307 TTE W/O DOPPLER COMPLETE: CPT

## 2020-11-06 RX ORDER — COLCHICINE 0.6 MG/1
0.3 TABLET ORAL 2 TIMES DAILY
Qty: 60 TABLET | Refills: 0 | Status: SHIPPED | OUTPATIENT
Start: 2020-11-06 | End: 2020-12-03

## 2020-11-06 RX ADMIN — COLCHICINE 0.3 MG: 0.6 TABLET, FILM COATED ORAL at 09:43

## 2020-11-06 RX ADMIN — PANTOPRAZOLE SODIUM 40 MG: 40 TABLET, DELAYED RELEASE ORAL at 04:17

## 2020-11-06 RX ADMIN — DIPHENHYDRAMINE HCL 25 MG: 25 TABLET ORAL at 04:17

## 2020-11-06 ASSESSMENT — PAIN SCALES - GENERAL: PAINLEVEL_OUTOF10: 0

## 2020-11-06 NOTE — CARE COORDINATION
11/6/20, 9:10 AM EST    Patient goals/plan/ treatment preferences discussed by  and . Patient goals/plan/ treatment preferences reviewed with patient/ family. Patient/ family verbalize understanding of discharge plan and are in agreement with goal/plan/treatment preferences. Understanding was demonstrated using the teach back method. AVS provided by RN at time of discharge, which includes all necessary medical information pertaining to the patients current course of illness, treatment, post-discharge goals of care, and treatment preferences. Discharging home with wife and son. Declined services.         IMM Letter  IMM Letter given to Patient/Family/Significant other/Guardian/POA/by[de-identified]   IMM Letter date given[de-identified] 11/04/20  IMM Letter time given[de-identified] 3029

## 2020-11-06 NOTE — DISCHARGE SUMMARY
°C)   SpO2: 94%     General Appearance: alert ,conversing, in no acute distress  Cardiovascular: normal rate, regular rhythm, normal S1 and S2, no murmurs, clicks, or gallops - friction rub present  Pulmonary/Chest: clear to auscultation bilaterally- no wheezes, rales or rhonchi, normal air movement, no respiratory distress  Neurological: alert, oriented, normal speech, no focal findings or movement disorder noted. Pulses: Bilateral radial, femoral, DP, and PT pulses noted  Lower Extremity: No edema noted. Groin incisions healing appropriately-No signs of infection or hematoma. Discharge Medications:         Medication List      START taking these medications    colchicine 0.6 MG tablet  Commonly known as:  COLCRYS  Take 0.5 tablets by mouth 2 times daily        CONTINUE taking these medications    aspirin 81 MG EC tablet     atorvastatin 80 MG tablet  Commonly known as:  LIPITOR     bethanechol 25 MG tablet  Commonly known as:  URECHOLINE     bumetanide 2 MG tablet  Commonly known as:  BUMEX  Take 1 tablet by mouth daily     cetirizine 10 MG tablet  Commonly known as:  ZYRTEC     folic acid 1 MG tablet  Commonly known as:  FOLVITE     * gabapentin 100 MG capsule  Commonly known as:  NEURONTIN     * gabapentin 100 MG capsule  Commonly known as:  NEURONTIN  Take 1 capsule by mouth 3 times daily for 360 days. irbesartan 300 MG tablet  Commonly known as:  AVAPRO  TAKE 1 TABLET EVERY NIGHT     nitroGLYCERIN 0.4 MG SL tablet  Commonly known as:  NITROSTAT  PLACE 1 TABLET UNDER THE TONGUE EVERY 5 MINUTES AS NEEDED FOR CHEST PAIN UP TO MAX OF 3 TOTAL DOSES. IF NO RELIEF AFTER 1 DOSE, CALL 911.      potassium chloride 10 MEQ extended release tablet  Commonly known as:  KLOR-CON M     sodium chloride 1 g tablet  Take 1 tablet by mouth 3 times daily     tamsulosin 0.4 MG capsule  Commonly known as:  FLOMAX  Take 1 capsule by mouth daily     therapeutic multivitamin-minerals tablet     vitamin B-1 100 MG tablet  Commonly known as:  THIAMINE         * This list has 2 medication(s) that are the same as other medications prescribed for you. Read the directions carefully, and ask your doctor or other care provider to review them with you. STOP taking these medications    clopidogrel 75 MG tablet  Commonly known as:  PLAVIX     metoprolol succinate 50 MG extended release tablet  Commonly known as:  TOPROL XL           Where to Get Your Medications      These medications were sent to [facility] [address], [address]  [address] [place] [address], [facility] Josr Silva    Phone:  [phone]   · colchicine 0.6 MG tablet             Follow Up Plan  1. Follow up with Cardiology in 1 week for incision check and post MitraClip f/u. He will need to have Plavix started at f/u OV if clinically stable along with restarting home BB. 2. Follow up with primary care provider in 1 week  3. Continue with ASA and Colchicine for pericarditis. Will assess if Plavix can be started at f/u OV next week. 4. Pt is fully agreeable to discharge plans. All questions were thoroughly answered.      Electronically signed by Suraj Marshall PA-C on 11/6/2020 at 8:08 AM

## 2020-11-06 NOTE — PROGRESS NOTES
CREATININE 1.6* 1.6* 1.6*   GLUCOSE 130* 100 113*   CALCIUM 8.4* 8.7 9.1     Hepatic: No results for input(s): LABALBU, AST, ALT, ALB, BILITOT, ALKPHOS in the last 72 hours. Meds:  Infusion:    sodium chloride 30 mL/hr at 11/05/20 2036     Meds:    colchicine  0.3 mg Oral BID    pantoprazole  40 mg Oral QAM AC    chlorhexidine   Topical Once    sodium chloride flush  10 mL Intravenous 2 times per day     Meds prn: sodium chloride flush, labetalol, HYDROcodone 5 mg - acetaminophen, ALPRAZolam, diphenhydrAMINE       Impression and Plan:  1. Chronic hyponatremia. Improved. Continue with salt tablets on discharge  Patient advised free water restriction. Advised alcohol reduction/cessation  2. CKD 3. Overall stable at baseline  3. Mild hyperkalemia. Resolved  4. Status post mitral clipping  5. Nonobstructive CAD  6. History of chronic alcohol abuse  7.   Pericardial effusion status post drain    D/W patient and RN  See me in office in 4 weeks with Srikanth Pérez MD  [facility]

## 2020-11-06 NOTE — PROGRESS NOTES
CLINICAL PHARMACY: DISCHARGE MED RECONCILIATION/REVIEW    [facility] ([facility] Select Patient?: No  Total # of Interventions Recommended: 1 -   - Discontinued Medication #: 1    Total # Interventions Accepted: 1  Intervention Severity:   - Level 1 Intervention Present?: No   - Level 2 #: 0   - Level 3 #: 1   Time Spent (min): 15    Richie Calix PharmD  11/6/2020  10:18 AM

## 2020-11-06 NOTE — PROGRESS NOTES
Pt discharged in stable condition with son. Reviewed AVS with patient and son including follow up appointments, medications, groin site care, and blood work ordered. All questions answered. No issues or concerns noted.

## 2020-11-10 ENCOUNTER — NURSE ONLY (OUTPATIENT)
Dept: CARDIOLOGY CLINIC | Age: 70
End: 2020-11-10

## 2020-11-10 ENCOUNTER — OFFICE VISIT (OUTPATIENT)
Dept: CARDIOTHORACIC SURGERY | Age: 70
End: 2020-11-10

## 2020-11-10 ENCOUNTER — TELEPHONE (OUTPATIENT)
Dept: CARDIOLOGY CLINIC | Age: 70
End: 2020-11-10

## 2020-11-10 VITALS
SYSTOLIC BLOOD PRESSURE: 123 MMHG | HEART RATE: 72 BPM | HEIGHT: 66 IN | DIASTOLIC BLOOD PRESSURE: 76 MMHG | BODY MASS INDEX: 27 KG/M2 | WEIGHT: 168 LBS

## 2020-11-10 PROCEDURE — 99024 POSTOP FOLLOW-UP VISIT: CPT | Performed by: PHYSICIAN ASSISTANT

## 2020-11-10 PROCEDURE — 93296 REM INTERROG EVL PM/IDS: CPT | Performed by: INTERNAL MEDICINE

## 2020-11-10 PROCEDURE — 93295 DEV INTERROG REMOTE 1/2/MLT: CPT | Performed by: INTERNAL MEDICINE

## 2020-11-10 RX ORDER — BETHANECHOL CHLORIDE 25 MG/1
25 TABLET ORAL 3 TIMES DAILY
Qty: 90 TABLET | Refills: 3 | Status: SHIPPED | OUTPATIENT
Start: 2020-11-10

## 2020-11-10 RX ORDER — POTASSIUM CHLORIDE 750 MG/1
10 TABLET, EXTENDED RELEASE ORAL DAILY
Qty: 60 TABLET | Refills: 3 | Status: SHIPPED | OUTPATIENT
Start: 2020-11-10 | End: 2020-11-11 | Stop reason: SDUPTHER

## 2020-11-10 RX ORDER — BUMETANIDE 2 MG/1
2 TABLET ORAL DAILY
Qty: 30 TABLET | Refills: 3 | Status: SHIPPED | OUTPATIENT
Start: 2020-11-10 | End: 2020-11-11 | Stop reason: SDUPTHER

## 2020-11-10 NOTE — PROGRESS NOTES
Structural Heart/Cardiology Follow up    11/13/2020 8:58 AM    Patient's Name/Date of Birth: Chelsie Allred / 1950 (79 y.o.)    PCP: Jonathan Ball MD      Date: November 13, 2020     HPI  We had the pleasure of seeing Chelsie Allred in the office today, as you know this is a very pleasant 79y.o. year old male with a history of mitral regurgitation who underwent a successful MitraClip on 11.03/20. Reports fatigue. The pt denies chest pressure, palpitations, SOB, fever, chills, N/V/D. PastMedical History:  Dmitriy  has a past medical history of CHARISSA (acute kidney injury) ([address] Arthritis, Blood circulation, collateral, CAD (coronary artery disease), Cardiomyopathy ([address] CHF with cardiomyopathy ([address] Chronic atrial fibrillation ([address] Chronic kidney disease, Congenital heart disease, Deafness congenital, ETOH abuse, ETOH abuse, GERD (gastroesophageal reflux disease), Hyperlipidemia, Hypertension, Medical non-compliance, Medtronic BiV ICD implanted 9/18/14 [facility], Medtronic BiV ICD-no atrial lead, and Severe malnutrition ([address] Past Surgical History:  The patient  has a past surgical history that includes Pacemaker insertion (2005); Cardiac defibrillator placement (2005); Prostate surgery (2011); and pr foot/toes surgery proc unlisted (Right, 11/16/2018). Allergies: The patient has No Known Allergies.     Medications:    Current Outpatient Medications:     bethanechol (URECHOLINE) 25 MG tablet, Take 1 tablet by mouth 3 times daily, Disp: 90 tablet, Rfl: 3    colchicine (COLCRYS) 0.6 MG tablet, Take 0.5 tablets by mouth 2 times daily, Disp: 60 tablet, Rfl: 0    vitamin B-1 (THIAMINE) 100 MG tablet, Take 100 mg by mouth daily, Disp: , Rfl:     sodium chloride 1 g tablet, Take 1 tablet by mouth 3 times daily, Disp: 90 tablet, Rfl: 3    irbesartan (AVAPRO) 300 MG tablet, TAKE 1 TABLET EVERY NIGHT, Disp: 90 tablet, Rfl: 0    folic acid (FOLVITE) 1 MG tablet, Take 1 mg by mouth daily, Disp: , Rfl:     Multiple Vitamins-Minerals (THERAPEUTIC MULTIVITAMIN-MINERALS) tablet, Take 1 tablet by mouth daily, Disp: , Rfl:     tamsulosin (FLOMAX) 0.4 MG capsule, Take 1 capsule by mouth daily, Disp: 30 capsule, Rfl: 5    cetirizine (ZYRTEC) 10 MG tablet, Take 10 mg by mouth daily, Disp: , Rfl:     atorvastatin (LIPITOR) 80 MG tablet, Take 80 mg by mouth nightly, Disp: , Rfl:     nitroGLYCERIN (NITROSTAT) 0.4 MG SL tablet, PLACE 1 TABLET UNDER THE TONGUE EVERY 5 MINUTES AS NEEDED FOR CHEST PAIN UP TO MAX OF 3 TOTAL DOSES. IF NO RELIEF AFTER 1 DOSE, CALL 911., Disp: 25 tablet, Rfl: 3    aspirin 81 MG EC tablet, Take 81 mg by mouth daily. , Disp: , Rfl:     potassium chloride (KLOR-CON M) 10 MEQ extended release tablet, Take 1 tablet by mouth daily, Disp: 90 tablet, Rfl: 3    bumetanide (BUMEX) 2 MG tablet, Take 1 tablet by mouth daily, Disp: 90 tablet, Rfl: 3    gabapentin (NEURONTIN) 100 MG capsule, Take 1 capsule by mouth 3 times daily for 360 days. , Disp: 270 capsule, Rfl: 3    Family History: This patient's family history includes Arthritis in his father and mother; Diabetes in his mother; Heart Disease in his father; High Blood Pressure in his father; High Cholesterol in his father. Social History:  Dmitriy  reports that he has never smoked. He has never used smokeless tobacco. He reports current alcohol use of about 8.0 standard drinks of alcohol per week. He reports that he does not use drugs. ROS:   Constitutional: Negative for activity change, chills, fatigue, fever and unexpected weight change. Respiratory: Negative for apnea, shortness of breath, wheezing and stridor. Cardiovascular: Negative for chest pain, palpitations and leg swelling. Gastrointestinal: Negative for hematochezia, melana, constipation, and N/V/D. Musculoskeletal: Negative for myalgias  Skin: Negative for color change, rash and wound. Neurological: Negative for dizziness or syncope.       Vitals:    11/10/20 0942   BP: 123/76   Site: Right Upper Arm   Position: Sitting   Cuff Size: Medium Adult   Pulse: 72   Weight: 168 lb (76.2 kg)   Height: 5' 6\" (1.676 m)       Physical Exam:   General Appearance: alert ,conversing, in no acute distress  Cardiovascular: normal rate, regular rhythm, normal S1 and S2, no murmurs, rubs, clicks, or gallops  Pulmonary/Chest: clear to auscultation bilaterally- no wheezes, rales or rhonchi, normal air movement, no respiratory distress  Abdomen:soft, non-tender, non-distended, normal bowel sounds, no bruits,   Extremities: no cyanosis, clubbing or edema. Pulses: Bilateral radial, femoral, DP, and PT pulses intact. No femoral bruits noted. Skin: warm and dry, no rash or erythema  Head: normocephalic and atraumatic  Neck: supple and non-tender without mass, no thyromegaly, no JVD   Musculoskeletal: normal range of motion, no joint swelling, deformity or tenderness. Neurological: alert, oriented, normal speech, no focal findings or movement disorder noted. Groin: Wounds healing appropriately. No signs of infection or hematoma. Assessment:   S/p MitraClip  Hx of Mitral Regurgitation    Plan:   1. Follow up with Dr. Tiffany Aguilar in 1 month with a CBC, BMP, and Echo.     The plan of care was discussed in detail with Dr. Tiffany Aguilar and Dr. Sydnie Paulino

## 2020-11-10 NOTE — PROGRESS NOTES
DR STEWART PT  MEDTRONIC BIV ICD CHECK IN OFFICE/ NO ATRIAL LEAD  VVIR   BATTERY 16 MTHS REMAINING/USES CARELINK  PRESENTS IN BIV PACED  UNDERLYING BV SENSED   RV IMPEDENCE 380  LV IMPEDENCDE 266  RV 58  RV WAVES 6.1  RV THRESHOLD 1 @ 0.4  LV THRESHOLD 1.25 @ 0.4  1 EPISODE OF NS VT   OPTIVOL ELEVATED/ PT JUST SEEN PER DR STEWART IN OFFICE

## 2020-11-10 NOTE — TELEPHONE ENCOUNTER
Orders received from Dr. Marisa Barry to schedule an ECHO, CBC and BMP prior to the 30 day post MitraClip follow up appointment.

## 2020-11-11 RX ORDER — POTASSIUM CHLORIDE 750 MG/1
10 TABLET, EXTENDED RELEASE ORAL DAILY
Qty: 90 TABLET | Refills: 3 | Status: SHIPPED | OUTPATIENT
Start: 2020-11-11

## 2020-11-11 RX ORDER — BUMETANIDE 2 MG/1
2 TABLET ORAL DAILY
Qty: 90 TABLET | Refills: 3 | Status: SHIPPED | OUTPATIENT
Start: 2020-11-11 | End: 2021-01-28 | Stop reason: SDUPTHER

## 2020-11-11 RX ORDER — BETHANECHOL CHLORIDE 25 MG/1
25 TABLET ORAL 3 TIMES DAILY
Qty: 90 TABLET | Refills: 3 | Status: CANCELLED | OUTPATIENT
Start: 2020-11-11

## 2020-11-18 ENCOUNTER — OFFICE VISIT (OUTPATIENT)
Dept: CARDIOLOGY CLINIC | Age: 70
End: 2020-11-18
Payer: MEDICARE

## 2020-11-18 VITALS
WEIGHT: 160.2 LBS | BODY MASS INDEX: 25.75 KG/M2 | HEIGHT: 66 IN | OXYGEN SATURATION: 93 % | SYSTOLIC BLOOD PRESSURE: 114 MMHG | HEART RATE: 54 BPM | DIASTOLIC BLOOD PRESSURE: 68 MMHG

## 2020-11-18 PROCEDURE — G8417 CALC BMI ABV UP PARAM F/U: HCPCS | Performed by: NURSE PRACTITIONER

## 2020-11-18 PROCEDURE — G8484 FLU IMMUNIZE NO ADMIN: HCPCS | Performed by: NURSE PRACTITIONER

## 2020-11-18 PROCEDURE — 3017F COLORECTAL CA SCREEN DOC REV: CPT | Performed by: NURSE PRACTITIONER

## 2020-11-18 PROCEDURE — 1123F ACP DISCUSS/DSCN MKR DOCD: CPT | Performed by: NURSE PRACTITIONER

## 2020-11-18 PROCEDURE — 4040F PNEUMOC VAC/ADMIN/RCVD: CPT | Performed by: NURSE PRACTITIONER

## 2020-11-18 PROCEDURE — 1111F DSCHRG MED/CURRENT MED MERGE: CPT | Performed by: NURSE PRACTITIONER

## 2020-11-18 PROCEDURE — 1036F TOBACCO NON-USER: CPT | Performed by: NURSE PRACTITIONER

## 2020-11-18 PROCEDURE — 99214 OFFICE O/P EST MOD 30 MIN: CPT | Performed by: NURSE PRACTITIONER

## 2020-11-18 PROCEDURE — G8427 DOCREV CUR MEDS BY ELIG CLIN: HCPCS | Performed by: NURSE PRACTITIONER

## 2020-11-18 RX ORDER — SODIUM CHLORIDE 1000 MG
1.5 TABLET, SOLUBLE MISCELLANEOUS 2 TIMES DAILY
Qty: 90 TABLET | Refills: 3
Start: 2020-11-18 | End: 2020-12-01 | Stop reason: DRUGHIGH

## 2020-11-18 ASSESSMENT — ENCOUNTER SYMPTOMS
ABDOMINAL PAIN: 0
NAUSEA: 0
SHORTNESS OF BREATH: 0
COLOR CHANGE: 0
COUGH: 0
CHEST TIGHTNESS: 0
ABDOMINAL DISTENTION: 0
WHEEZING: 0
APNEA: 0

## 2020-11-18 NOTE — PROGRESS NOTES
[facility]       Visit Date: 11/18/2020  Cardiologist:  Dr. Sourav Rodriguez / Ced Ingram   Primary Care Physician: Dr. Brittny El MD    Keyanna Liu is a 79 y.o. male who presents today for:  Chief Complaint   Patient presents with    Congestive Heart Failure       HPI: Obtained from patient and chart    Keyanna Liu is a 79 y.o. male who presents to the office for a follow up visit in the heart failure clinic. Hx A fib, HTN, CAD PCI stents, HFpEF 35-40% via LHC, improved after MitraClip x 2 (11/3/20) to 50-55%, CKD, ETOH, hyponatremia. He is here with his son, pt is deaf mute but does not uses American sign language which makes it difficult to communicate. He used to drink 15 beers a day, he has cut back to 3 now up to 6 beers a day plus 2-3 cans of pop. Doing ok since MitraClip. Able to perform ADLs and \"get around\" at his home. Sleeps in a bed with the [address] a wedge and with 2-3 pillows, does feel SOB to lay flat. He is making urine. Minimal LE edema. Appetite has been decreased but he is drinking more beer. Only taking the sodium pills twice a day.     Accompanied by: son  Last hospital admission related to Heart Failure:  Feb 2019  Chest Pain: no  Worsening SOB: no  Worsening Orthopnea/PND: no  Edema: no  Any extra diuretic use: no  Weight gain: no  Fatigue: no  Abdominal bloating: no  Appetite: fair  SHAINA: never tested  Cough: no  Compliant checking home weight: not daily 160 lbs  Compliant checking blood pressure: no      Past Medical History:   Diagnosis Date    CHARISSA (acute kidney injury) ([address]     Arthritis     general    Blood circulation, collateral     CAD (coronary artery disease)     Cardiomyopathy ([address] 8/13/2014    CHF with cardiomyopathy ([address] 2/4/2019    Chronic atrial fibrillation [facility] 8/13/2014    Chronic kidney disease     Congenital heart disease     Deafness congenital 8/13/2014    ETOH abuse 8/13/2014    ETOH abuse     GERD (gastroesophageal reflux disease)     Hyperlipidemia     Hypertension     Medical non-compliance 8/13/2014    Medtronic BiV ICD implanted 9/18/14 [facility] 10/8/2014    Medtronic BiV ICD-no atrial lead 10/8/2014    Severe malnutrition ([address]      Past Surgical History:   Procedure Laterality Date    CARDIAC DEFIBRILLATOR PLACEMENT  2005    PACEMAKER INSERTION  2005    MO FOOT/TOES SURGERY PROC UNLISTED Right 11/16/2018    RIGHT FOOT 1ST MTP JOINT ARTHRODESIS performed by Lj Jefferson MD at [place] [address]  2011    PVP     Family History   Problem Relation Age of Onset    Diabetes Mother     Arthritis Mother     Heart Disease Father     High Blood Pressure Father     High Cholesterol Father     Arthritis Father      Social History     Tobacco Use    Smoking status: Never Smoker    Smokeless tobacco: Never Used   Substance Use Topics    Alcohol use:  Yes     Alcohol/week: 8.0 standard drinks     Types: 8 Cans of beer per week     Comment: 8 cans daily     Current Outpatient Medications   Medication Sig Dispense Refill    sodium chloride 1 g tablet Take 1.5 tablets by mouth 2 times daily 90 tablet 3    potassium chloride (KLOR-CON M) 10 MEQ extended release tablet Take 1 tablet by mouth daily 90 tablet 3    bumetanide (BUMEX) 2 MG tablet Take 1 tablet by mouth daily 90 tablet 3    bethanechol (URECHOLINE) 25 MG tablet Take 1 tablet by mouth 3 times daily 90 tablet 3    colchicine (COLCRYS) 0.6 MG tablet Take 0.5 tablets by mouth 2 times daily 60 tablet 0    vitamin B-1 (THIAMINE) 100 MG tablet Take 100 mg by mouth daily      irbesartan (AVAPRO) 300 MG tablet TAKE 1 TABLET EVERY NIGHT 90 tablet 0    folic acid (FOLVITE) 1 MG tablet Take 1 mg by mouth daily      Multiple Vitamins-Minerals (THERAPEUTIC MULTIVITAMIN-MINERALS) tablet Take 1 tablet by mouth daily      tamsulosin (FLOMAX) 0.4 MG capsule Take 1 capsule by mouth daily 30 capsule 5    cetirizine (ZYRTEC) 10 MG tablet Take 10 mg by mouth daily      atorvastatin (LIPITOR) 80 MG tablet Take 80 mg by mouth nightly      nitroGLYCERIN (NITROSTAT) 0.4 MG SL tablet PLACE 1 TABLET UNDER THE TONGUE EVERY 5 MINUTES AS NEEDED FOR CHEST PAIN UP TO MAX OF 3 TOTAL DOSES. IF NO RELIEF AFTER 1 DOSE, CALL 911. 25 tablet 3    aspirin 81 MG EC tablet Take 81 mg by mouth daily.  gabapentin (NEURONTIN) 100 MG capsule Take 1 capsule by mouth 3 times daily for 360 days. 270 capsule 3     No current facility-administered medications for this visit. No Known Allergies    SUBJECTIVE:   Review of Systems   Constitutional: Negative for activity change, appetite change, fatigue and fever. HENT: Negative for congestion. Deaf mute   Respiratory: Negative for apnea, cough, chest tightness, shortness of breath and wheezing. Cardiovascular: Negative for chest pain, palpitations and leg swelling. Gastrointestinal: Negative for abdominal distention, abdominal pain and nausea. Genitourinary: Negative for difficulty urinating and dysuria. Musculoskeletal: Negative for arthralgias and gait problem. Skin: Negative for color change. Neurological: Negative for dizziness, numbness and headaches. Psychiatric/Behavioral: Negative for agitation, confusion and sleep disturbance. The patient is not nervous/anxious. OBJECTIVE:   Today's Vitals:  /68   Pulse 54   Ht 5' 6\" (1.676 m)   Wt 160 lb 3.2 oz (72.7 kg)   SpO2 93%   BMI 25.86 kg/m²     Physical Exam  Vitals signs reviewed. Constitutional:       Appearance: He is well-developed. HENT:      Head: Normocephalic and atraumatic. Comments: Deaf/mute - he does not know or use American sign language son is here to interpret   Eyes:      Pupils: Pupils are equal, round, and reactive to light. Neck:      Musculoskeletal: Normal range of motion. Vascular: No JVD. Cardiovascular:      Rate and Rhythm: Normal rate and regular rhythm.       Heart sounds: Normal heart sounds. No murmur. Comments: ICD  Pulmonary:      Effort: Pulmonary effort is normal. No respiratory distress. Breath sounds: No rales. Abdominal:      General: There is no distension. Palpations: Abdomen is soft. Tenderness: There is no abdominal tenderness. Musculoskeletal:         General: No tenderness. Right lower leg: Edema (trace) present. Left lower leg: Edema (trace) present. Skin:     General: Skin is warm and dry. Capillary Refill: Capillary refill takes less than 2 seconds. Neurological:      Mental Status: He is alert and oriented to person, place, and time. Psychiatric:         Mood and Affect: Mood normal.         Behavior: Behavior normal.         Wt Readings from Last 3 Encounters:   11/18/20 160 lb 3.2 oz (72.7 kg)   11/10/20 168 lb (76.2 kg)   11/06/20 168 lb 1.6 oz (76.2 kg)     BP Readings from Last 3 Encounters:   11/18/20 114/68   11/10/20 123/76   11/06/20 125/75     Pulse Readings from Last 3 Encounters:   11/18/20 54   11/10/20 72   11/06/20 70     Body mass index is 25.86 kg/m². ECHO:   11/6/20  Summary   Limited study. Ejection fraction was estimated at 50-55%. Left atrial size was severely dilated. No pericardial effusion seen. Device leads seen. S/p MitraClip x 2. There was trace-mild mitral regurgitation. Left sided pleural effusion. Signature      ----------------------------------------------------------------   Electronically signed by Rip Lewis MD (Interpreting   physician) on 11/07/2020 at 11:46 AM   ----------------------------------------------------------------      Findings      Mitral Valve   S/p MitraClip x 2. There was mild mitral regurgitation. Aortic Valve   The aortic valve was trileaflet with normal thickness and cuspal   separation. Tricuspid Valve   The tricuspid valve structure was normal with normal leaflet separation.       Pulmonic Valve   The pulmonic valve leaflets were not well seen.      Left Atrium   Left atrial size was severely dilated. Left Ventricle   Normal left ventricular size and systolic function. There were no regional wall motion abnormalities. Wall thickness was within normal limits. Ejection fraction was estimated at 50-55%. Right Atrium   Right atrial size was normal.      Right Ventricle   The right ventricular size was normal with normal systolic function and   wall thickness. Device leads seen. Pericardial Effusion   The pericardium was normal in appearance with no evidence of a pericardial   effusion. Pleural Effusion   Left sided pleural effusion. Results reviewed:  BNP:   Lab Results   Component Value Date    BNP 5,218 05/03/2019    PROBNP 2159.0 (H) 11/03/2020     CBC:   Lab Results   Component Value Date    WBC 8.7 11/05/2020    RBC 3.17 11/05/2020    HGB 10.6 11/05/2020    HCT 32.8 11/05/2020     11/05/2020     CMP:    Lab Results   Component Value Date     11/06/2020    K 4.7 11/06/2020    K 5.3 09/14/2020     11/06/2020    CO2 18 11/06/2020    BUN 15 11/06/2020    CREATININE 1.6 11/06/2020    LABGLOM 43 11/06/2020    GLUCOSE 113 11/06/2020    CALCIUM 9.1 11/06/2020     Hepatic Function Panel:    Lab Results   Component Value Date    ALKPHOS 120 11/03/2020    ALT 8 11/03/2020    AST 13 11/03/2020    PROT 6.6 11/03/2020    BILITOT 0.3 11/03/2020    BILIDIR <0.2 02/04/2019    LABALBU 3.6 11/03/2020     Magnesium:    Lab Results   Component Value Date    MG 2.0 10/27/2020     PT/INR:    Lab Results   Component Value Date    INR 1.19 11/03/2020     Lipids:    Lab Results   Component Value Date    TRIG 79 02/05/2019    HDL 40 02/05/2019    LDLCALC 31 02/05/2019       ASSESSMENT AND PLAN:   The patient's condition/symptoms are Stable      Diagnosis Orders   1. CHF (congestive heart failure), NYHA class II, chronic, combined ([address]  Basic Metabolic Panel   2. Essential hypertension     3.  Medtronic BiV ICD-no atrial lead 4. Coronary artery disease involving native coronary artery of native heart with angina pectoris ([address]     5. Ischemic cardiomyopathy     6. Severe mitral regurgitation     7. Alcohol abuse     8. Deafness congenital     EF improved from 35-40% to 50-55% after MitrClip 11/3/20    Plan:  · GDMT   · ACE/ARB/ARNi: irbesartan 300 mg daily  · Beta Blocker: Toprol 50 mg daily  · Aldosterone antagonist: no  · Diuretic/Potassium: Bumex 2 mg daily, Potassium 10 mEq daily  · Hydralazine/Nitrate: no  · Other: Sodium chloride 1.5 mg bid, Plavix, Lipitor, ASA 81 mg  · Unfortunately drinking 5-6 beers a day plus 2-3 cans of pop. We discussed the need to minimized fluid intake to 64 oz a day d/t hyponatremia and CHF. Difficult to communicate as he is deaf/mute and does not use or know American sign language and his son is the interpretor. Minimal LE edema and no bloating. Since he has only been taking the sodium chloride bid we will change to 1.5 g bid (from 1 mg tid) and check a BMP in 10 days. His last sodium was 136 but this was in the hospital when he was not drinking like he does now. He has an appt with Nephrology Dec 1. If he continue to lose weight or if his kidney function worsens we may have to adjust Bumex as well. · HF Zones reviewed.   · Daily weights and record  · Fluid restriction of 2 Liters per day (64 oz)   · Limit sodium in diet to 8563-9714 mg/day  · Healthier food options were discussed   · Monitor BP daily 1 hour after meds are taken  · Increase activity as tolerated     Patient was instructed to call the [facility] for changes in the following symptoms:    Weight gain of 3 pounds in 1 day or 5 pounds in 1 week   Increased shortness of breath   Shortness of breath while laying down   Cough   Chest pain   Swelling in feet, ankles or legs   Tenderness or bloating in the abdomen   Fatigue    Decreased appetite or feeling \"full\"   Nausea    Confusion      Return in about 3 months (around 2/18/2021). or sooner if needed     Patient given educational materials - see patient instructions. We discussed the importance of weighing oneself and recording daily. We also discussed the importance of a low sodium diet, higher sodium foods to avoid and appropriate low sodium food choices. Discussed use, benefit, and side effects of prescribed medications. All patient questions answered. Patient verbalizes understanding of plan of care using teach back method, and is agreeable to the treatment plan. Greater then 40 minutes of time was spent reviewing the chart and educating the patient about HF, medications, diet, exercise, and discussing the plan of care. I personally spent more then 50% of the appt time face to face with the patient counseling/coordinating patient's care.       Electronicallysigned by SUSAN Mora CNP on 11/18/2020 at 1:07 PM

## 2020-11-30 ENCOUNTER — TELEPHONE (OUTPATIENT)
Dept: NEPHROLOGY | Age: 70
End: 2020-11-30

## 2020-11-30 LAB
BASOPHILS ABSOLUTE: 0 /ΜL
BASOPHILS RELATIVE PERCENT: 0.7 %
BUN BLDV-MCNC: 14 MG/DL
CALCIUM SERPL-MCNC: 9.8 MG/DL
CHLORIDE BLD-SCNC: 96 MMOL/L
CO2: 20 MMOL/L
CREAT SERPL-MCNC: 1.6 MG/DL
EOSINOPHILS ABSOLUTE: 0.9 /ΜL
EOSINOPHILS RELATIVE PERCENT: 14.9 %
GFR CALCULATED: NORMAL
GLUCOSE BLD-MCNC: 93 MG/DL
HCT VFR BLD CALC: 37.4 % (ref 41–53)
HEMOGLOBIN: 12.4 G/DL (ref 13.5–17.5)
LYMPHOCYTES ABSOLUTE: 1.4 /ΜL
LYMPHOCYTES RELATIVE PERCENT: 22.5 %
MCH RBC QN AUTO: 32.4 PG
MCHC RBC AUTO-ENTMCNC: 33.2 G/DL
MCV RBC AUTO: 97.7 FL
MONOCYTES ABSOLUTE: 0.8 /ΜL
MONOCYTES RELATIVE PERCENT: 12.5 %
NEUTROPHILS ABSOLUTE: 3 /ΜL
NEUTROPHILS RELATIVE PERCENT: 49.1 %
PLATELET # BLD: 215 K/ΜL
PMV BLD AUTO: 10.7 FL
POTASSIUM SERPL-SCNC: 4.3 MMOL/L
RBC # BLD: 3.83 10^6/ΜL
SODIUM BLD-SCNC: 130 MMOL/L
WBC # BLD: 6.1 10^3/ML

## 2020-12-01 ENCOUNTER — TELEPHONE (OUTPATIENT)
Dept: CARDIOLOGY CLINIC | Age: 70
End: 2020-12-01

## 2020-12-01 ENCOUNTER — OFFICE VISIT (OUTPATIENT)
Dept: NEPHROLOGY | Age: 70
End: 2020-12-01
Payer: MEDICARE

## 2020-12-01 VITALS
BODY MASS INDEX: 25.82 KG/M2 | SYSTOLIC BLOOD PRESSURE: 118 MMHG | WEIGHT: 160 LBS | HEART RATE: 70 BPM | DIASTOLIC BLOOD PRESSURE: 60 MMHG

## 2020-12-01 PROCEDURE — 1111F DSCHRG MED/CURRENT MED MERGE: CPT | Performed by: INTERNAL MEDICINE

## 2020-12-01 PROCEDURE — 1036F TOBACCO NON-USER: CPT | Performed by: INTERNAL MEDICINE

## 2020-12-01 PROCEDURE — G8484 FLU IMMUNIZE NO ADMIN: HCPCS | Performed by: INTERNAL MEDICINE

## 2020-12-01 PROCEDURE — 99213 OFFICE O/P EST LOW 20 MIN: CPT | Performed by: INTERNAL MEDICINE

## 2020-12-01 PROCEDURE — G8427 DOCREV CUR MEDS BY ELIG CLIN: HCPCS | Performed by: INTERNAL MEDICINE

## 2020-12-01 PROCEDURE — G8417 CALC BMI ABV UP PARAM F/U: HCPCS | Performed by: INTERNAL MEDICINE

## 2020-12-01 PROCEDURE — 1123F ACP DISCUSS/DSCN MKR DOCD: CPT | Performed by: INTERNAL MEDICINE

## 2020-12-01 PROCEDURE — 3017F COLORECTAL CA SCREEN DOC REV: CPT | Performed by: INTERNAL MEDICINE

## 2020-12-01 PROCEDURE — 4040F PNEUMOC VAC/ADMIN/RCVD: CPT | Performed by: INTERNAL MEDICINE

## 2020-12-01 RX ORDER — SODIUM CHLORIDE 1000 MG
2 TABLET, SOLUBLE MISCELLANEOUS 2 TIMES DAILY
Qty: 120 TABLET | Refills: 3 | Status: SHIPPED
Start: 2020-12-01 | End: 2021-11-12 | Stop reason: SDUPTHER

## 2020-12-01 NOTE — TELEPHONE ENCOUNTER
PLEASE SEE PATIENT'S LAB:    12/1/2020  1:44 PM - Prerna Goncalves CMA     Component  Value  Flag  Ref Range  Units  Status    Sodium  130     mmol/L  Final    Chloride  96     mmol/L  Final    Potassium  4.3     mmol/L  Final    BUN  14     mg/dL  Final    CREATININE  1.6      Final    Glucose  93     mg/dL  Final    CO2  20.0     mmol/L  Final    Calcium  9.8          Component  Value  Ref Range & Units  Status  Resulted  Lab    WBC  6.1  10^3/mL  Final  11/30/2020 12:00 AM  Unknown    Hemoglobin  12.4Abnormal    13.5 - 17.5 g/dL  Final  11/30/2020 12:00 AM  Unknown    Hematocrit  37.4Abnormal    41 - 53 %  Final  11/30/2020 12:00 AM  Unknown    Platelets  030  K/µL  Final  11/30/2020 12:00 AM  Unknown    Neutrophils %  49.1  %  Final  11/30/2020 12:00 AM  Unknown    Lymphocytes %  22.5  %  Final  11/30/2020 12:00 AM  Unknown    Monocytes %  12.5  %  Final  11/30/2020 12:00 AM  Unknown    Eosinophils %  14.9  %  Final  11/30/2020 12:00 AM  Unknown    Basophils %  0.7  %  Final  11/30/2020 12:00 AM  Unknown    Neutrophils Absolute  3.0  /µL  Final  11/30/2020 12:00 AM  Unknown    Lymphocytes Absolute  1.4  /µL  Final  11/30/2020 12:00 AM  Unknown    Monocytes Absolute  0.8  /µL  Final  11/30/2020 12:00 AM  Unknown    Eosinophils Absolute  0.9  /µL  Final  11/30/2020 12:00 AM  Unknown    Basophils Absolute  0.0  /µL  Final  11/30/2020 12:00 AM  Unknown    RBC  3.83  10^6/µL  Final  11/30/2020 12:00 AM  Unknown    MCV  97.7  fL  Final  11/30/2020 12:00 AM  Unknown    MCH  32.4  pg  Final  11/30/2020 12:00 AM  Unknown    MCHC  33.2  g/dL  Final  11/30/2020 12:00 AM  Unknown    MPV  10.7  fL  Final  11/30/2020 12:00 AM  Unknown    Scans on Order [id number]

## 2020-12-01 NOTE — PROGRESS NOTES
[facility]    [facility], [address]   [place], [address]  [phone]  Progress Note  12/1/2020 9:48 AM      Pt Name:    Alverto Heath  YOB: 1950  Primary Care Physician:  Jesus Feliz MD     Chief Complaint:   Chief Complaint   Patient presents with    Follow-Up from Hospital: Hyponatremia, CKD        Background Information/Interval History:   80 yo white male who was referred to us by heart failure clinic for evaluation of hyponatremia. Patient is deaf and mute. He has CAD s/p stents, HFpEF, CKD, Atrial Fibrillation. He has been on diuretics for CHF.  He has hx of heavy alcohol abuse. Previously was drinking upto 14-16 beers a day and sometimes more. Pt is here for follow-up. Here with his . He is here alone in the office. He feels well. He was recently in hospital. He underwent mitral clip X 2 (Nov 3, 2020). He was also noted to have pericarditis and underwent colchicine treatment. He is still drinking heavy alcohol--sometimes 6 beers and sometimes 12 beers. Not very adherent to recommendations.      Past History:  Past Medical History:   Diagnosis Date    CHARISSA (acute kidney injury) ([address]     Arthritis     general    Blood circulation, collateral     CAD (coronary artery disease)     Cardiomyopathy ([address] 8/13/2014    CHF with cardiomyopathy ([address] 2/4/2019    Chronic atrial fibrillation ([address] 8/13/2014    Chronic kidney disease     Congenital heart disease     Deafness congenital 8/13/2014    ETOH abuse 8/13/2014    ETOH abuse     GERD (gastroesophageal reflux disease)     Hyperlipidemia     Hypertension     Medical non-compliance 8/13/2014    Medtronic BiV ICD implanted 9/18/14 [facility] 10/8/2014    Medtronic BiV ICD-no atrial lead 10/8/2014    Severe malnutrition ([address]      Past Surgical History:   Procedure Laterality Date    CARDIAC DEFIBRILLATOR PLACEMENT  2005    PACEMAKER INSERTION  2005    ND FOOT/TOES SURGERY PROC UNLISTED Right 11/16/2018    RIGHT FOOT 1ST MTP JOINT ARTHRODESIS performed by Wilfredo Landau, MD at [facility]        VITALS:  /60 (Site: Left Upper Arm, Position: Sitting, Cuff Size: Small Adult)   Pulse 70   Wt 160 lb (72.6 kg)   BMI 25.82 kg/m²   Wt Readings from Last 3 Encounters:   12/01/20 160 lb (72.6 kg)   11/18/20 160 lb 3.2 oz (72.7 kg)   11/10/20 168 lb (76.2 kg)     Body mass index is 25.82 kg/m². General Appearance: alert and cooperative with exam, appears comfortable, no distress  Lungs: Air entry diminished, poor effort, no crackles or rales, no use of accessory muscles  Heart: S1, S2 heard  GI: soft, non-tender, no guarding  Extremities: No sig LE edema       Medications:  Current Outpatient Medications   Medication Sig Dispense Refill    sodium chloride 1 g tablet Take 1.5 tablets by mouth 2 times daily 90 tablet 3    potassium chloride (KLOR-CON M) 10 MEQ extended release tablet Take 1 tablet by mouth daily 90 tablet 3    bumetanide (BUMEX) 2 MG tablet Take 1 tablet by mouth daily 90 tablet 3    bethanechol (URECHOLINE) 25 MG tablet Take 1 tablet by mouth 3 times daily 90 tablet 3    colchicine (COLCRYS) 0.6 MG tablet Take 0.5 tablets by mouth 2 times daily 60 tablet 0    vitamin B-1 (THIAMINE) 100 MG tablet Take 100 mg by mouth daily      irbesartan (AVAPRO) 300 MG tablet TAKE 1 TABLET EVERY NIGHT 90 tablet 0    folic acid (FOLVITE) 1 MG tablet Take 1 mg by mouth daily      Multiple Vitamins-Minerals (THERAPEUTIC MULTIVITAMIN-MINERALS) tablet Take 1 tablet by mouth daily      tamsulosin (FLOMAX) 0.4 MG capsule Take 1 capsule by mouth daily 30 capsule 5    cetirizine (ZYRTEC) 10 MG tablet Take 10 mg by mouth daily      atorvastatin (LIPITOR) 80 MG tablet Take 80 mg by mouth nightly      nitroGLYCERIN (NITROSTAT) 0.4 MG SL tablet PLACE 1 TABLET UNDER THE TONGUE EVERY 5 MINUTES AS NEEDED FOR CHEST PAIN UP TO MAX OF 3 TOTAL DOSES.  IF NO RELIEF AFTER 1 DOSE, CALL 911. 25 tablet 3    aspirin 81 MG EC tablet Take 81 mg by mouth daily.  gabapentin (NEURONTIN) 100 MG capsule Take 1 capsule by mouth 3 times daily for 360 days. 270 capsule 3     No current facility-administered medications for this visit. Laboratory & Diagnostics:  Echo: EF 55%  Na 122, K 5.3, creat 1.2  May 2019: Na 133, K 4.7, creat 1.5, BNP 5218  UA: no blood, no protein, UPCR <94 mg/g  US: R 8.9, L 7.2, bladder wall thickening    June 2019: Na 122, K 4.6  Nov 30 2020: Na 130, K 4.3, Creat 1.6     Impression/Plan:   1. Hyponatremia: Sodium 130. Multiple factors to consider including excessive alcohol intake/beer potomania and hypervolemia. Has HF with preserved EF. Sodium is still low. He is drinking excessive amount of alcohol. Really needs to cut back on alcohol intake. Discussed with patient. Continue with salt tablets. Change salt tablets to 2 grams BID. BMP in 4 weeks  2. CKD III: creat is 1.6. stable. Overall stable  3. Hx CAD s/p stents  4. CHF/HFpEF: Advised fluid restriction. On Bumex. 5. Atrial Fibrillation  6. Excessive alcohol consumption: as above, strongly advised reduction  7. Bladder wall thickening: Patient declined urology evaluation. He understands that he could miss a malignant lesion. Also had some urinary retention. Orders Placed This Encounter   Procedures    Basic Metabolic Panel    Basic Metabolic Panel    Hemoglobin and Hematocrit, Blood     Return in about 3 months (around 3/1/2021).     Fermín Melton MD  [facility]

## 2020-12-03 ENCOUNTER — OFFICE VISIT (OUTPATIENT)
Dept: CARDIOLOGY CLINIC | Age: 70
End: 2020-12-03
Payer: MEDICARE

## 2020-12-03 ENCOUNTER — TELEPHONE (OUTPATIENT)
Dept: CARDIOLOGY CLINIC | Age: 70
End: 2020-12-03

## 2020-12-03 VITALS
DIASTOLIC BLOOD PRESSURE: 62 MMHG | BODY MASS INDEX: 25.23 KG/M2 | SYSTOLIC BLOOD PRESSURE: 86 MMHG | WEIGHT: 157 LBS | HEIGHT: 66 IN | HEART RATE: 55 BPM

## 2020-12-03 PROCEDURE — 99213 OFFICE O/P EST LOW 20 MIN: CPT | Performed by: INTERNAL MEDICINE

## 2020-12-03 PROCEDURE — G8484 FLU IMMUNIZE NO ADMIN: HCPCS | Performed by: INTERNAL MEDICINE

## 2020-12-03 PROCEDURE — 1036F TOBACCO NON-USER: CPT | Performed by: INTERNAL MEDICINE

## 2020-12-03 PROCEDURE — 93000 ELECTROCARDIOGRAM COMPLETE: CPT | Performed by: INTERNAL MEDICINE

## 2020-12-03 PROCEDURE — 1123F ACP DISCUSS/DSCN MKR DOCD: CPT | Performed by: INTERNAL MEDICINE

## 2020-12-03 PROCEDURE — G8427 DOCREV CUR MEDS BY ELIG CLIN: HCPCS | Performed by: INTERNAL MEDICINE

## 2020-12-03 PROCEDURE — G8417 CALC BMI ABV UP PARAM F/U: HCPCS | Performed by: INTERNAL MEDICINE

## 2020-12-03 PROCEDURE — 3017F COLORECTAL CA SCREEN DOC REV: CPT | Performed by: INTERNAL MEDICINE

## 2020-12-03 PROCEDURE — 1111F DSCHRG MED/CURRENT MED MERGE: CPT | Performed by: INTERNAL MEDICINE

## 2020-12-03 PROCEDURE — 4040F PNEUMOC VAC/ADMIN/RCVD: CPT | Performed by: INTERNAL MEDICINE

## 2020-12-03 ASSESSMENT — ENCOUNTER SYMPTOMS
PHOTOPHOBIA: 0
BLURRED VISION: 0
COUGH: 0
DIARRHEA: 0
ABDOMINAL PAIN: 0
SHORTNESS OF BREATH: 0
VOMITING: 0

## 2020-12-03 NOTE — TELEPHONE ENCOUNTER
Verbal Order from Dr. Prado Self to instruct patient to stop Colchicine. Patient needs contacted. Please agree.

## 2020-12-03 NOTE — PROGRESS NOTES
[address] [place] [address],[place] [address] [address] [facility] [address]  Dept: [phone]  Dept Fax: [phone]  Loc: [phone]    Visit Date: 12/3/2020  Patient Name: Keyanna Liu    Chief Complaint   Patient presents with    1 Month Follow-Up     mitral clip/echo       HPI:   Patient is a 79 y.o. male with BiV ICD who is s/p mitral clipping x2 on 11/3/20 for severe mitral regurg, who also required a temporary pericardial drain and was placed on colchicine for pericarditis. Chronic Diastolic HF with pEF recent ECHO 12/1/20 nml EF, mild enlargement of LA, mild to moderated MR. No wall motion abnormalities. ICD analysis on 11/10/20 showed no events of VT, VF or Afib. He mentions improvement in his prior symptoms and is able to ambulated significantly longer distances than prior. Patient denies any chest pain, sob, dizziness, blurred vision, or palpitations.       Current Outpatient Medications:     sodium chloride 1 g tablet, Take 2 tablets by mouth 2 times daily, Disp: 120 tablet, Rfl: 3    potassium chloride (KLOR-CON M) 10 MEQ extended release tablet, Take 1 tablet by mouth daily, Disp: 90 tablet, Rfl: 3    bumetanide (BUMEX) 2 MG tablet, Take 1 tablet by mouth daily, Disp: 90 tablet, Rfl: 3    bethanechol (URECHOLINE) 25 MG tablet, Take 1 tablet by mouth 3 times daily (Patient taking differently: Take 25 mg by mouth 2 times daily Takes 1.5 bid), Disp: 90 tablet, Rfl: 3    colchicine (COLCRYS) 0.6 MG tablet, Take 0.5 tablets by mouth 2 times daily, Disp: 60 tablet, Rfl: 0    vitamin B-1 (THIAMINE) 100 MG tablet, Take 100 mg by mouth daily, Disp: , Rfl:     irbesartan (AVAPRO) 300 MG tablet, TAKE 1 TABLET EVERY NIGHT, Disp: 90 tablet, Rfl: 0    folic acid (FOLVITE) 1 MG tablet, Take 1 mg by mouth daily, Disp: , Rfl:     Multiple Vitamins-Minerals (THERAPEUTIC MULTIVITAMIN-MINERALS) tablet, Take 1 tablet by mouth daily, Disp: , Rfl:     tamsulosin Negative for congestion. Eyes: Negative for blurred vision and photophobia. Cardiovascular: Negative for chest pain, dyspnea on exertion, leg swelling and syncope. Respiratory: Negative for cough and shortness of breath. Musculoskeletal: Negative for muscle weakness. Gastrointestinal: Negative for abdominal pain, diarrhea and vomiting. Genitourinary: Negative for dysuria. Neurological: Negative for headaches and light-headedness. Psychiatric/Behavioral: Negative for altered mental status. Objective:     Pulse 55   Ht 5' 6\" (1.676 m)   BMI 25.82 kg/m²     Wt Readings from Last 3 Encounters:   12/01/20 160 lb (72.6 kg)   11/18/20 160 lb 3.2 oz (72.7 kg)   11/10/20 168 lb (76.2 kg)     BP Readings from Last 3 Encounters:   12/01/20 118/60   11/18/20 114/68   11/10/20 123/76       Physical Exam  Vitals signs reviewed. Constitutional:       General: He is not in acute distress. Appearance: Normal appearance. He is normal weight. He is not ill-appearing or toxic-appearing. HENT:      Head: Normocephalic and atraumatic. Right Ear: External ear normal.      Left Ear: External ear normal.      Nose: Nose normal. No congestion. Mouth/Throat:      Mouth: Mucous membranes are moist.      Pharynx: Oropharynx is clear. Eyes:      General: No scleral icterus. Extraocular Movements: Extraocular movements intact. Conjunctiva/sclera: Conjunctivae normal.   Neck:      Musculoskeletal: Normal range of motion and neck supple. No neck rigidity or muscular tenderness. Cardiovascular:      Rate and Rhythm: Normal rate and regular rhythm. Pulses: Normal pulses. Heart sounds: Normal heart sounds. Comments: Mid systolic murmur 2/6  Pulmonary:      Effort: Pulmonary effort is normal. No respiratory distress. Breath sounds: Normal breath sounds. No wheezing. Chest:      Chest wall: No tenderness. Abdominal:      General: Abdomen is flat.  Bowel sounds are normal. Palpations: Abdomen is soft. Tenderness: There is no abdominal tenderness. Musculoskeletal: Normal range of motion. Right lower leg: No edema. Left lower leg: No edema. Lymphadenopathy:      Cervical: No cervical adenopathy. Skin:     General: Skin is warm and dry. Capillary Refill: Capillary refill takes less than 2 seconds. Neurological:      General: No focal deficit present. Mental Status: He is oriented to person, place, and time. Psychiatric:         Mood and Affect: Mood normal.         Behavior: Behavior normal.         Results for orders placed or performed in visit on 12/01/20   CBC   Result Value Ref Range    WBC 6.1 10^3/mL    Hemoglobin 12.4 (A) 13.5 - 17.5 g/dL    Hematocrit 37.4 (A) 41 - 53 %    Platelets 274 K/µL    Neutrophils % 49.1 %    Lymphocytes % 22.5 %    Monocytes % 12.5 %    Eosinophils % 14.9 %    Basophils % 0.7 %    Neutrophils Absolute 3.0 /µL    Lymphocytes Absolute 1.4 /µL    Monocytes Absolute 0.8 /µL    Eosinophils Absolute 0.9 /µL    Basophils Absolute 0.0 /µL    RBC 3.83 10^6/µL    MCV 97.7 fL    MCH 32.4 pg    MCHC 33.2 g/dL    MPV 10.7 fL   Basic Metabolic Panel   Result Value Ref Range    Sodium 130 mmol/L    Chloride 96 mmol/L    Potassium 4.3 mmol/L    BUN 14 mg/dL    CREATININE 1.6     Glucose 93 mg/dL    CO2 20.0 mmol/L    Calcium 9.8 mg/dL    Gfr Calculated         Cardiac Tests:    I have individually reviewed the below cardiac tests. ECHO:   Results for orders placed in visit on 12/02/20   ECHO Complete 2D W Doppler W Color       Assessment/Plan   S/p Mitral Clip x2 for severe symptomatic mitral regurgitation, NYHA I  BiV ICD   Chronic diastolic CHF with preserved EF   Congenital deaf/muteness communicated via sign language   HTN  HLD   Hx of PIC of LAD and RCA   Alcohol use   Patient doing well, no major symptoms.   Has mild-mod residual MR between clips this is not amenable to further management due to elevation of mitral gradient. BP slightly lower but runs higher at home. Taking all meds. Had pericarditis treated with Colchcine, will d/c at this time no further issues or effusion. He  Is on ASA. Will f/u with Dr. James Schaefer. Discussed diet/exercise/BP/weight loss/health lifestyle choices/lipids; the patient understands the goals and will try to comply. Disposition:   1 year    Electronically signed by Abdullahi Menjivar MD [facility]  12/3/2020 at 11:18 AM EST    Attending Supervising Physician's Attestation Statement  I performed a history and physical examination on the patient and discussed the management with the resident physician. I reviewed and agree with the findings and plan as documented in the resident's note.     Electronically signed by Demetria Zaidi MD on 12/3/20 at 1:08 PM EST

## 2020-12-07 ENCOUNTER — TELEPHONE (OUTPATIENT)
Dept: CARDIOLOGY CLINIC | Age: 70
End: 2020-12-07

## 2020-12-07 NOTE — TELEPHONE ENCOUNTER
[address] (GDZA-11)  Dmitriy Shell  12/7/2020    The following questions refer to your heart failure and how it may affect your life. Please read and complete the following questions. There is no right or wrong answers. Please evonne the answer that best applies to you. 1. Heart failure affects different people in different ways. Some feel shortness of breath while others feel fatigue. Please indicate how much you are limited by heart failure (shortness of breath or fatigue) in your ability to do the following activities over the past 2 weeks. Activity Extremely Limited Quite a bit limited Moderatly Limited Slightly Limited Not at all Limited Limited for other reasons/ did not do the activity   Showering/ Bathing           x   Walking 1 block on Level ground     x    Hurrying or jogging (as if to catch a bus)      x        1         2       3  4       5   6     2. Over the past 2 weeks, how many times did you have swelling in your feet, ankles or legs when you woke up in the morning? Every morning 3 or more times per week, but not every day 1-2 times per week Less than once a week Never over the past 2 weeks         x   `        1          2   3       4                     5           3. Over the past 2 weeks, on average, how many times has fatigue limited your ability to do what you wanted? All of the time Several times per day At least once a day 3 or more times per week 1-2 times per week Less than once a week Never over the past 2 weeks          x             1        2     3  4        5       6  7        4. Over the past 2 weeks, on average, how many times has shortness of breath limited your ability to do what you    wanted? All of the time Several times per day At least once a day 3 or more times per week 1-2 times per week Less than once a week Never over the past 2 weeks           x            1        2     3  4         5       6  7     5.  Over the past 2 weeks, on average, how many times have you been forced to sleep sitting up in a chair or with at least 3 pillows to prop you up because of shortness of breath? Every night 3 or more times per week, but not every day 1-2 times per week Less than once a week Never over the past 2 weeks     x       `        1          2   3       4                     5        6.  Over the past 2 weeks, how much has your heart failure limited your enjoyment of life? It has extremely limited my enjoyment of life It has limited my enjoyment of life quite a bit It has moderately limited my enjoyment of life It has slightly limited my enjoyment of life It has not limited my enjoyment of life        x          1          2   3       4           5     7.  If you had to spend the rest of your life with your heart failure the way it is right now, how would you feel about this? Not at all satisfied Mostly dissatisfied Somewhat satisfied Mostly satisfied Completely satisfied        x               1                         2                            3                        4                            5        8. How much does your heart failure affect your lifestyle?  Please indicate how your heart failure may have limited your participation in the following activities over the past 2 weeks    Activity Severely limited Limited quite a bit Moderately limited Slightly limited Did not limit at all Does not apply or did not do this activity   Hobbies, recreational activities    x     Working or doing household chores    x     Visiting family or friends out of your home    x                 1      2   3         4       5            6             Meter Walk Test    Time 1: 8 seconds  Time 2: 8 seconds  Time 3: 9 seconds

## 2020-12-24 ENCOUNTER — PROCEDURE VISIT (OUTPATIENT)
Dept: CARDIOLOGY CLINIC | Age: 70
End: 2020-12-24
Payer: MEDICARE

## 2020-12-24 ENCOUNTER — TELEPHONE (OUTPATIENT)
Dept: CARDIOLOGY CLINIC | Age: 70
End: 2020-12-24

## 2020-12-24 PROCEDURE — 93297 REM INTERROG DEV EVAL ICPMS: CPT | Performed by: NUCLEAR MEDICINE

## 2020-12-24 PROCEDURE — G2066 INTER DEVC REMOTE 30D: HCPCS | Performed by: NUCLEAR MEDICINE

## 2021-01-05 RX ORDER — BETHANECHOL CHLORIDE 25 MG/1
25 TABLET ORAL 3 TIMES DAILY
Qty: 90 TABLET | Refills: 3 | Status: CANCELLED | OUTPATIENT
Start: 2021-01-05

## 2021-01-05 RX ORDER — GABAPENTIN 100 MG/1
100 CAPSULE ORAL 3 TIMES DAILY
Qty: 270 CAPSULE | Refills: 3 | Status: CANCELLED | OUTPATIENT
Start: 2021-01-05 | End: 2021-12-31

## 2021-01-05 NOTE — TELEPHONE ENCOUNTER
Henrine Krabbe called requesting a refill on the following medications:    HAS REFILLS LEFT OF BETHANECHOL AT LOCAL PHARMACY BUT ALOT CHEAPER TO GET 90 DAY SUPPLY THROUGH MAIL ORDER  Requested Prescriptions     Pending Prescriptions Disp Refills    gabapentin (NEURONTIN) 100 MG capsule 270 capsule 3     Sig: Take 1 capsule by mouth 3 times daily for 360 days.     bethanechol (URECHOLINE) 25 MG tablet 90 tablet 3     Sig: Take 1 tablet by mouth 3 times daily     Pharmacy verified:  [facility]      Date of last visit: 12-03-20 (nhan)  Date of next visit (if applicable): [phone] [facility])

## 2021-01-25 ENCOUNTER — TELEPHONE (OUTPATIENT)
Dept: CARDIOLOGY CLINIC | Age: 71
End: 2021-01-25

## 2021-01-25 RX ORDER — IRBESARTAN 300 MG/1
TABLET ORAL
Qty: 90 TABLET | Refills: 1 | Status: SHIPPED | OUTPATIENT
Start: 2021-01-25 | End: 2021-06-15 | Stop reason: SDUPTHER

## 2021-01-25 NOTE — TELEPHONE ENCOUNTER
Call back to pt daughter vm was full, I sent a script to [facility] for the iberstartan, but we do not prescribe the gabapentin. They will have to contact whomever has him on that.

## 2021-01-28 RX ORDER — BUMETANIDE 2 MG/1
2 TABLET ORAL DAILY
Qty: 90 TABLET | Refills: 3 | Status: SHIPPED | OUTPATIENT
Start: 2021-01-28

## 2021-01-28 NOTE — TELEPHONE ENCOUNTER
Thom Billingsley called requesting a refill on the following medications:  Requested Prescriptions     Pending Prescriptions Disp Refills    bumetanide (BUMEX) 2 MG tablet 90 tablet 3     Sig: Take 1 tablet by mouth daily     [facility]      Date of last visit: 12/31/20  Date of next visit (if applicable): Visit date not found

## 2021-03-04 ENCOUNTER — TELEPHONE (OUTPATIENT)
Dept: CARDIOLOGY CLINIC | Age: 71
End: 2021-03-04

## 2021-03-04 NOTE — LETTER
.. [address] [place] [id number]  Phone: [phone]  Fax: [phone]      March 4, 2021    [address] [place], [place] [id number] [place] [facility]      Dear [place]: Octavia Vázquez We have been unable to contact you by phone. Our office did not receive your Carelink transmission which was scheduled for March 2, 2021. Please check the connections and send a manual download ASAP. If you need help sending a download, please call [employer] at [phone]. Our office is not responsible for missed transmissions. Please call our office at [phone] if you have questions for the pacemaker/ICD clinic     Thank You for your assistance!     [facility] Pacemaker/ICD clinic

## 2021-03-17 LAB
BUN BLDV-MCNC: 12 MG/DL
CALCIUM SERPL-MCNC: 9.7 MG/DL
CHLORIDE BLD-SCNC: 97 MMOL/L
CO2: 18 MMOL/L
CREAT SERPL-MCNC: 1.5 MG/DL
GFR CALCULATED: 49
GLUCOSE BLD-MCNC: 90 MG/DL
POTASSIUM SERPL-SCNC: 4.3 MMOL/L
SODIUM BLD-SCNC: 131 MMOL/L

## 2021-03-22 ENCOUNTER — OFFICE VISIT (OUTPATIENT)
Dept: CARDIOLOGY CLINIC | Age: 71
End: 2021-03-22
Payer: MEDICARE

## 2021-03-22 VITALS
BODY MASS INDEX: 26.68 KG/M2 | OXYGEN SATURATION: 99 % | DIASTOLIC BLOOD PRESSURE: 70 MMHG | SYSTOLIC BLOOD PRESSURE: 103 MMHG | WEIGHT: 166 LBS | HEART RATE: 91 BPM | HEIGHT: 66 IN

## 2021-03-22 DIAGNOSIS — I25.5 ISCHEMIC CARDIOMYOPATHY: ICD-10-CM

## 2021-03-22 DIAGNOSIS — H90.5 DEAFNESS CONGENITAL: ICD-10-CM

## 2021-03-22 DIAGNOSIS — I50.42 CHF (CONGESTIVE HEART FAILURE), NYHA CLASS II, CHRONIC, COMBINED (HCC): Primary | ICD-10-CM

## 2021-03-22 DIAGNOSIS — I25.10 CORONARY ARTERY DISEASE INVOLVING NATIVE CORONARY ARTERY OF NATIVE HEART WITHOUT ANGINA PECTORIS: ICD-10-CM

## 2021-03-22 DIAGNOSIS — I48.0 PAROXYSMAL ATRIAL FIBRILLATION (HCC): ICD-10-CM

## 2021-03-22 DIAGNOSIS — Z98.61 HISTORY OF PTCA: ICD-10-CM

## 2021-03-22 DIAGNOSIS — F10.10 ALCOHOL ABUSE: ICD-10-CM

## 2021-03-22 DIAGNOSIS — Z95.810 BIVENTRICULAR IMPLANTABLE CARDIOVERTER-DEFIBRILLATOR IN SITU: ICD-10-CM

## 2021-03-22 PROCEDURE — G8417 CALC BMI ABV UP PARAM F/U: HCPCS | Performed by: NURSE PRACTITIONER

## 2021-03-22 PROCEDURE — 99214 OFFICE O/P EST MOD 30 MIN: CPT | Performed by: NURSE PRACTITIONER

## 2021-03-22 PROCEDURE — 1123F ACP DISCUSS/DSCN MKR DOCD: CPT | Performed by: NURSE PRACTITIONER

## 2021-03-22 PROCEDURE — G8484 FLU IMMUNIZE NO ADMIN: HCPCS | Performed by: NURSE PRACTITIONER

## 2021-03-22 PROCEDURE — 1036F TOBACCO NON-USER: CPT | Performed by: NURSE PRACTITIONER

## 2021-03-22 PROCEDURE — G8427 DOCREV CUR MEDS BY ELIG CLIN: HCPCS | Performed by: NURSE PRACTITIONER

## 2021-03-22 PROCEDURE — 3017F COLORECTAL CA SCREEN DOC REV: CPT | Performed by: NURSE PRACTITIONER

## 2021-03-22 PROCEDURE — 4040F PNEUMOC VAC/ADMIN/RCVD: CPT | Performed by: NURSE PRACTITIONER

## 2021-03-22 RX ORDER — TRAMADOL HYDROCHLORIDE 50 MG/1
TABLET ORAL
COMMUNITY
Start: 2020-10-11 | End: 2021-11-03 | Stop reason: ALTCHOICE

## 2021-03-22 ASSESSMENT — ENCOUNTER SYMPTOMS
CHEST TIGHTNESS: 0
COUGH: 0
SHORTNESS OF BREATH: 0
ABDOMINAL DISTENTION: 1
ABDOMINAL PAIN: 0
WHEEZING: 0
NAUSEA: 0
COLOR CHANGE: 0
APNEA: 0

## 2021-03-22 NOTE — PATIENT INSTRUCTIONS
You may receive a survey regarding the care you received during your visit. Your input is valuable to us. We encourage you to complete and return your survey. We hope you will choose us in the future for your healthcare needs. NEW ORDERS FROM TODAY:      Continue:  · Take all medications as prescribed   · Daily weights and record - please try to weigh yourself upon awakening before eating or drinking   · Fluid restriction of 2 Liters per day (64 oz)  · Limit sodium in diet to around 5335-4766 mg/day  · Monitor BP - take BP 1 hour after meds  · Increase activity as tolerated     Call the [facility] Failure [facility] for any of the following symptoms: [phone]   Weight gain of 3 pounds in 1 day or 5 pounds in 1 week   Increased shortness of breath   Shortness of breath while laying down   Cough   Chest pain   Swelling in feet, ankles or legs   Tenderness or bloating in the abdomen   Fatigue    Decreased appetite or feeling \"full\"    Nausea    Confusion     **PLEASE bring all medications in original bottles with you to your next appointment**    Educational websites:    http://www.[email or web address] org (American Heart Association)    [employer]. com (Entresto/Novartis)    [employer] HF.com (CardioMEMS)    Too much sodium causes your body to hold on to extra water. This can raise your blood pressure and force your heart and kidneys to work harder. In very serious cases, this could cause you to be put in the hospital. It might even be life-threatening. By limiting sodium, you will feel better and lower your risk of serious problems. The most common source of sodium is salt. People get most of the salt in their diet from canned, prepared, and packaged foods. Fast food and restaurant meals also are very high in sodium. Your doctor will probably limit your sodium to less than 2,000 milligrams (mg) a day. This limit counts all the sodium in prepared and packaged foods and any salt you add to your food. Follow-up care is a key part of your treatment and safety. Be sure to make and go to all appointments, and call your doctor if you are having problems. It's also a good idea to know your test results and keep a list of the medicines you take. How can you care for yourself at home? Read food labels  · Read labels on cans and food packages. The labels tell you how much sodium is in each serving. Make sure that you look at the serving size. If you eat more than the serving size, you have eaten more sodium. · Food labels also tell you the Percent Daily Value for sodium. Choose products with low Percent Daily Values for sodium. · Be aware that sodium can come in forms other than salt, including monosodium glutamate (MSG), sodium citrate, and sodium bicarbonate (baking soda). MSG is often added to Asian food. When you eat out, you can sometimes ask for food without MSG or added salt. Buy low-sodium foods  · Buy foods that are labeled \"unsalted\" (no salt added), \"sodium-free\" (less than 5 mg of sodium per serving), or \"low-sodium\" (less than 140 mg of sodium per serving). Foods labeled \"reduced-sodium\" and \"light sodium\" may still have too much sodium. Be sure to read the label to see how much sodium you are getting. · Buy fresh vegetables, or frozen vegetables without added sauces. Buy low-sodium versions of canned vegetables, soups, and other canned goods. Prepare low-sodium meals  · Cut back on the amount of salt you use in cooking. This will help you adjust to the taste. Do not add salt after cooking. One teaspoon of salt has about 2,300 mg of sodium. · Take the salt shaker off the table. · Flavor your food with garlic, lemon juice, onion, vinegar, herbs, and spices. Do not use soy sauce, lite soy sauce, steak sauce, onion salt, garlic salt, celery salt, mustard, or ketchup on your food. · Use low-sodium salad dressings, sauces, and ketchup. Or make your own salad dressings and sauces without adding salt.   · Use less salt (or none) when recipes call for it. You can often use half the salt a recipe calls for without losing flavor. Other foods such as rice, pasta, and grains do not need added salt. · Rinse canned vegetables, and cook them in fresh water. This removes somebut not allof the salt. · Avoid water that is naturally high in sodium or that has been treated with water softeners, which add sodium. Call your local water company to find out the sodium content of your water supply. If you buy bottled water, read the label and choose a sodium-free brand. Avoid high-sodium foods  · Avoid eating:  ? Smoked, cured, salted, and canned meat, fish, and poultry. ? Ham, chavarria, hot dogs, and luncheon meats. ? Regular, hard, and processed cheese and regular peanut butter. ? Crackers with salted tops, and other salted snack foods such as pretzels, chips, and salted popcorn. ? Frozen prepared meals, unless labeled low-sodium. ? Canned and dried soups, broths, and bouillon, unless labeled sodium-free or low-sodium. ? Canned vegetables, unless labeled sodium-free or low-sodium. ? Western Pretty fries, pizza, tacos, and other fast foods. ? Pickles, olives, ketchup, and other condiments, especially soy sauce, unless labeled sodium-free or low-sodium.

## 2021-03-22 NOTE — PROGRESS NOTES
[facility]       Visit Date: 3/22/2021  Cardiologist:  Dr. Jamil Muller  Primary Care Physician: Dr. Cirilo Adkins MD    Cuco Beebe is a 79 y.o. male who presents today for:  Chief Complaint   Patient presents with    Congestive Heart Failure       HPI: Obtained from patient and chart    Cuco Beebe is a 79 y.o. male who presents to the office for a follow up visit in the heart failure clinic. Hx A fib, HTN, CAD PCI stents, HFpEF 35-40% via LHC, improved after MitraClip x 2 (11/3/20) to 50-55%, CKD, ETOH, hyponatremia. Pericarditis treated with colchicine. He is here with his son, pt is deaf mute but does not uses American sign language which makes it difficult to communicate. He used to drink 15 beers a day, he has cut back to 3 now up to 9 beers a day plus pop. Doing ok since MitraClip. Able to perform ADLs and \"get around\" at his home. Sleeps in a bed with the [address] [place] a wedge and with 2-3 pillows, does feel SOB to lay flat.  He is making urine. Minimal LE edema, wearing compression socks today. Up 6 pounds, appetite improved. He does not want to come back here anymore as he is tired of doctor bills.       Accompanied by: son  Last hospital admission related to Heart Failure:  Feb 2019  Chest Pain:  no  Worsening SOB: no  Worsening Orthopnea/PND: no  Edema: no  Any extra diuretic use: no  Weight gain: no  Fatigue: no  Abdominal bloating: some  Appetite: fair to good  SHAINA: never tested  Cough: no  Compliant checking home weight: not daily 160-163 lbs  Compliant checking blood pressure: no      Past Medical History:   Diagnosis Date    CHARISSA (acute kidney injury) ([address]     Arthritis     general    Blood circulation, collateral     CAD (coronary artery disease)     Cardiomyopathy ([address] 8/13/2014    CHF with cardiomyopathy ([address] 2/4/2019    Chronic atrial fibrillation ([address] 8/13/2014    Chronic kidney disease     Congenital heart disease     Deafness congenital 8/13/2014    ETOH abuse 8/13/2014    ETOH abuse     GERD (gastroesophageal reflux disease)     Hyperlipidemia     Hypertension     Medical non-compliance 8/13/2014    Medtronic BiV ICD implanted 9/18/14 [facility] 10/8/2014    Medtronic BiV ICD-no atrial lead 10/8/2014    Severe malnutrition ([address]      Past Surgical History:   Procedure Laterality Date    CARDIAC DEFIBRILLATOR PLACEMENT  2005    PACEMAKER INSERTION  2005    MA FOOT/TOES SURGERY PROC UNLISTED Right 11/16/2018    RIGHT FOOT 1ST MTP JOINT ARTHRODESIS performed by Iva Galicia MD at [facility]  2011    PVP     Family History   Problem Relation Age of Onset    Diabetes Mother     Arthritis Mother     Heart Disease Father     High Blood Pressure Father     High Cholesterol Father     Arthritis Father      Social History     Tobacco Use    Smoking status: Never Smoker    Smokeless tobacco: Never Used   Substance Use Topics    Alcohol use:  Yes     Alcohol/week: 8.0 standard drinks     Types: 8 Cans of beer per week     Comment: 8 cans daily     Current Outpatient Medications   Medication Sig Dispense Refill    traMADol (ULTRAM) 50 MG tablet       bumetanide (BUMEX) 2 MG tablet Take 1 tablet by mouth daily 90 tablet 3    irbesartan (AVAPRO) 300 MG tablet TAKE 1 TABLET EVERY NIGHT 90 tablet 1    sodium chloride 1 g tablet Take 2 tablets by mouth 2 times daily 120 tablet 3    potassium chloride (KLOR-CON M) 10 MEQ extended release tablet Take 1 tablet by mouth daily 90 tablet 3    bethanechol (URECHOLINE) 25 MG tablet Take 1 tablet by mouth 3 times daily (Patient taking differently: Take 25 mg by mouth 2 times daily Takes 1.5 bid) 90 tablet 3    vitamin B-1 (THIAMINE) 100 MG tablet Take 100 mg by mouth daily      folic acid (FOLVITE) 1 MG tablet Take 1 mg by mouth daily      Multiple Vitamins-Minerals (THERAPEUTIC MULTIVITAMIN-MINERALS) tablet Take 1 tablet by mouth daily      tamsulosin (FLOMAX) 0.4 MG capsule Take 1 capsule by mouth daily 30 capsule 5    cetirizine (ZYRTEC) 10 MG tablet Take 10 mg by mouth daily      atorvastatin (LIPITOR) 80 MG tablet Take 80 mg by mouth nightly      gabapentin (NEURONTIN) 100 MG capsule Take 1 capsule by mouth 3 times daily for 360 days. 270 capsule 3    aspirin 81 MG EC tablet Take 81 mg by mouth daily.  nitroGLYCERIN (NITROSTAT) 0.4 MG SL tablet PLACE 1 TABLET UNDER THE TONGUE EVERY 5 MINUTES AS NEEDED FOR CHEST PAIN UP TO MAX OF 3 TOTAL DOSES. IF NO RELIEF AFTER 1 DOSE, CALL 911. 25 tablet 3     No current facility-administered medications for this visit. No Known Allergies    SUBJECTIVE:   Review of Systems   Constitutional: Negative for activity change, appetite change, fatigue and fever. HENT: Negative for congestion. Javy damon does not use American sign language    Respiratory: Negative for apnea, cough, chest tightness, shortness of breath and wheezing. Cardiovascular: Positive for leg swelling. Negative for chest pain and palpitations. Gastrointestinal: Positive for abdominal distention. Negative for abdominal pain and nausea. Genitourinary: Negative for difficulty urinating and dysuria. Musculoskeletal: Negative for arthralgias and gait problem. Skin: Negative for color change. Neurological: Negative for dizziness, numbness and headaches. Psychiatric/Behavioral: Negative for agitation, confusion and sleep disturbance. The patient is not nervous/anxious. OBJECTIVE:   Today's Vitals:  /70   Pulse 91   Ht 5' 6\" (1.676 m)   Wt 166 lb (75.3 kg)   SpO2 99%   BMI 26.79 kg/m²     Physical Exam  Vitals signs reviewed. Constitutional:       Appearance: He is well-developed. HENT:      Head: Normocephalic and atraumatic. Ears:      Comments: Javy damon - does not use American sign language son \"interprets\"   Eyes:      Pupils: Pupils are equal, round, and reactive to light.    Neck:      Musculoskeletal: Normal range of motion. Vascular: No JVD. Cardiovascular:      Rate and Rhythm: Normal rate and regular rhythm. Heart sounds: Normal heart sounds. No murmur. Comments: ICD  Pulmonary:      Effort: Pulmonary effort is normal. No respiratory distress. Breath sounds: No rales. Abdominal:      General: There is no distension (slight). Palpations: Abdomen is soft. Tenderness: There is no abdominal tenderness. Musculoskeletal:         General: No tenderness. Right lower leg: Edema (trace) present. Left lower leg: Edema (trace++) present. Skin:     General: Skin is warm and dry. Capillary Refill: Capillary refill takes less than 2 seconds. Neurological:      Mental Status: He is alert and oriented to person, place, and time. Psychiatric:         Mood and Affect: Mood normal.         Behavior: Behavior normal.         Wt Readings from Last 3 Encounters:   03/22/21 166 lb (75.3 kg)   12/03/20 157 lb (71.2 kg)   12/01/20 160 lb (72.6 kg)     BP Readings from Last 3 Encounters:   03/22/21 103/70   12/03/20 86/62   12/01/20 118/60     Pulse Readings from Last 3 Encounters:   03/22/21 91   12/03/20 55   12/01/20 70     Body mass index is 26.79 kg/m².     ECHO:   12/1/20      Results reviewed:  BNP:   Lab Results   Component Value Date    BNP 5,218 05/03/2019    PROBNP 2159.0 (H) 11/03/2020     CBC:   Lab Results   Component Value Date    WBC 6.1 11/30/2020    RBC 3.83 11/30/2020    HGB 12.4 11/30/2020    HCT 37.4 11/30/2020     11/30/2020     CMP:    Lab Results   Component Value Date     03/17/2021    K 4.3 03/17/2021    K 5.3 09/14/2020    CL 97 03/17/2021    CO2 18.0 03/17/2021    BUN 12 03/17/2021    CREATININE 1.5 03/17/2021    LABGLOM 49 03/17/2021    LABGLOM 43 11/06/2020    GLUCOSE 90 03/17/2021    CALCIUM 9.7 03/17/2021     Hepatic Function Panel:    Lab Results   Component Value Date    ALKPHOS 120 11/03/2020    ALT 8 11/03/2020    AST 13 11/03/2020    PROT 6.6 11/03/2020    BILITOT 0.3 11/03/2020    BILIDIR <0.2 02/04/2019    LABALBU 3.6 11/03/2020     Magnesium:    Lab Results   Component Value Date    MG 2.0 10/27/2020     PT/INR:    Lab Results   Component Value Date    INR 1.19 11/03/2020     Lipids:    Lab Results   Component Value Date    TRIG 79 02/05/2019    HDL 40 02/05/2019    LDLCALC 31 02/05/2019       ASSESSMENT AND PLAN:   The patient's condition/symptoms are Stable      Diagnosis Orders   1. CHF (congestive heart failure), NYHA class II, chronic, combined ([facility]     2. Ischemic cardiomyopathy     3. Coronary artery disease involving native coronary artery of native heart without angina pectoris     4. History of PTCA     5. Paroxysmal atrial fibrillation (HCC)     6. Medtronic BiV ICD-no atrial lead     7. Alcohol abuse     8. Deafness congenital         Plan:  · GDMT   · ACE/ARB/ARNi: Irbesartan 300 mg daily  · Beta Blocker: no - was on Torpol? ?  · Aldosterone antagonist: no  · Diuretic/Potassium: Bumex 2 mg and Potassium 10 meq daily  · Hydralazine/Nitrate: no  · Other: ASA 81 mg, Lipitor, Sodium cl 1 mg bid  · No signs of fluid overload. HR is 91, not on Toprol unclear why. He does not want to return to the HF Clinic as he has \"too many doctors. \" We discussed HF Zones, son verbalized understanding and will call Dr Carla Mcdaniels or Jannette Blakely. · HF Zones reviewed.   · Daily weights and record  · Fluid restriction of 2 Liters per day (64 oz)   · Limit sodium in diet to 3231-9058 mg/day  · Healthier food options were discussed   · Monitor BP daily 1 hour after meds are taken  · Increase activity as tolerated     Patient was instructed to call the [facility] for changes in the following symptoms:    Weight gain of 3 pounds in 1 day or 5 pounds in 1 week   Increased shortness of breath   Shortness of breath while laying down   Cough   Chest pain   Swelling in feet, ankles or legs   Tenderness or bloating in the abdomen   Fatigue    Decreased appetite or feeling \"full\"   Nausea    Confusion      Return if symptoms worsen or fail to improve. or sooner if needed     Patient given educational materials - see patient instructions. We discussed the importance of weighing oneself and recording daily. We also discussed the importance of a low sodium diet, higher sodium foods to avoid and appropriate low sodium food choices. Discussed use, benefit, and side effects of prescribed medications. All patient questions answered. Patient verbalizes understanding of plan of care using teach back method, and is agreeable to the treatment plan. Greater then 30 minutes of time was spent reviewing the chart and educating the patient about HF, medications, diet, exercise, and discussing the plan of care. I personally spent more then 50% of the appt time face to face with the patient counseling/coordinating patient's care.       Electronicallysigned by SUSAN Riojas CNP on 3/22/2021 at 2:46 PM

## 2021-04-15 ENCOUNTER — TELEPHONE (OUTPATIENT)
Dept: CARDIOLOGY CLINIC | Age: 71
End: 2021-04-15

## 2021-04-15 NOTE — LETTER
.. [address] [place] [address] [address] [facility] [address] [address]  Phone: [phone]  Fax: [phone]        April 15, 2021    [place]  [address] [address] [facility]      Dear Dereck Soto: Arin Ibrahim We have been unable to contact you by phone. Our office did not receive your Carelink transmission which was scheduled for January and March. Please check the connections and send a manual download ASAP. Carelink website indicates your monitor is disconnected. We cannot monitor your defibrillator while monitor is disconnected. If you need help sending a download, please call [employer] at [phone]. Our office is not responsible for missed transmissions. Please call our office at [phone] if you have questions for the pacemaker/ICD clinic     Thank You for your assistance!     [facility] Pacemaker/ICD clinic

## 2021-04-30 ENCOUNTER — PROCEDURE VISIT (OUTPATIENT)
Dept: CARDIOLOGY CLINIC | Age: 71
End: 2021-04-30
Payer: MEDICARE

## 2021-04-30 DIAGNOSIS — Z95.810 BIVENTRICULAR IMPLANTABLE CARDIOVERTER-DEFIBRILLATOR IN SITU: Primary | ICD-10-CM

## 2021-04-30 PROCEDURE — 93295 DEV INTERROG REMOTE 1/2/MLT: CPT | Performed by: NUCLEAR MEDICINE

## 2021-04-30 PROCEDURE — 93296 REM INTERROG EVL PM/IDS: CPT | Performed by: NUCLEAR MEDICINE

## 2021-04-30 NOTE — PROGRESS NOTES
[facility] Medtronic BiV ICD  Patient of Baki    Battery 10 months    Underlying rhythm BV    RV Impedance 456  LV Impedance 342  RV shock 68  R wave sensing 5.8    V Paced 97.2%    RV Thresholds 0.625 @ 0.40  LV Thresholds 1.0 @ 0.40    Programmed Mode VVIR    Afib Byers 0%    Episodes : 2 runs NS VT     Optivol WNL

## 2021-05-06 LAB
BUN BLDV-MCNC: 15 MG/DL
CALCIUM SERPL-MCNC: 9.5 MG/DL
CHLORIDE BLD-SCNC: 98 MMOL/L
CO2: 20 MMOL/L
CREAT SERPL-MCNC: 1.4 MG/DL
GFR CALCULATED: 53
GLUCOSE BLD-MCNC: 83 MG/DL
HCT VFR BLD CALC: 39.4 % (ref 41–53)
HEMOGLOBIN: 13.2 G/DL (ref 13.5–17.5)
POTASSIUM SERPL-SCNC: 4.5 MMOL/L
SODIUM BLD-SCNC: 132 MMOL/L

## 2021-05-11 ENCOUNTER — OFFICE VISIT (OUTPATIENT)
Dept: NEPHROLOGY | Age: 71
End: 2021-05-11
Payer: MEDICARE

## 2021-05-11 VITALS
SYSTOLIC BLOOD PRESSURE: 120 MMHG | WEIGHT: 163.2 LBS | OXYGEN SATURATION: 98 % | HEART RATE: 78 BPM | BODY MASS INDEX: 26.34 KG/M2 | DIASTOLIC BLOOD PRESSURE: 60 MMHG

## 2021-05-11 DIAGNOSIS — N18.32 STAGE 3B CHRONIC KIDNEY DISEASE (HCC): ICD-10-CM

## 2021-05-11 DIAGNOSIS — E87.1 HYPONATREMIA: Primary | ICD-10-CM

## 2021-05-11 PROCEDURE — G8417 CALC BMI ABV UP PARAM F/U: HCPCS | Performed by: INTERNAL MEDICINE

## 2021-05-11 PROCEDURE — 99213 OFFICE O/P EST LOW 20 MIN: CPT | Performed by: INTERNAL MEDICINE

## 2021-05-11 PROCEDURE — 4040F PNEUMOC VAC/ADMIN/RCVD: CPT | Performed by: INTERNAL MEDICINE

## 2021-05-11 PROCEDURE — 3017F COLORECTAL CA SCREEN DOC REV: CPT | Performed by: INTERNAL MEDICINE

## 2021-05-11 PROCEDURE — 1123F ACP DISCUSS/DSCN MKR DOCD: CPT | Performed by: INTERNAL MEDICINE

## 2021-05-11 PROCEDURE — G8427 DOCREV CUR MEDS BY ELIG CLIN: HCPCS | Performed by: INTERNAL MEDICINE

## 2021-05-11 PROCEDURE — 1036F TOBACCO NON-USER: CPT | Performed by: INTERNAL MEDICINE

## 2021-05-11 NOTE — PROGRESS NOTES
[facility]    [facility], [address]   [place] [address]  [phone]  Progress Note  5/11/2021 9:48 AM      Pt Name:    Leandra Arteaga  YOB: 1950  Primary Care Physician:  Abby Mcclellan MD     Chief Complaint:   Chief Complaint   Patient presents with    Follow-Up: Hyponatremia, CKD        Background Information/Interval History:   80 yo white male who was referred to us by heart failure clinic for evaluation of hyponatremia. Patient is deaf and mute. He has CAD s/p stents, HFpEF, CKD, Atrial Fibrillation. He has been on diuretics for CHF.  He has hx of heavy alcohol abuse. Previously was drinking upto 14-16 beers a day and sometimes more. He underwent mitral clip X 2 (Nov 3, 2020). He was also noted to have pericarditis and underwent colchicine treatment. Here with his son- still drinking heavily. Around 10-12 beers every day. No smoking. No leg swelling. No chest pain or shortness of breath per patient. No sp complaints. Has no plans to quit drinking. Following with CHF clinic but told them that he does \"not want to see too many doctors. \"   present       Past History:  Past Medical History:   Diagnosis Date    CHARISSA (acute kidney injury) ([address]     Arthritis     general    Blood circulation, collateral     CAD (coronary artery disease)     Cardiomyopathy ([address] 8/13/2014    CHF with cardiomyopathy ([address] 2/4/2019    Chronic atrial fibrillation ([address] 8/13/2014    Chronic kidney disease     Congenital heart disease     Deafness congenital 8/13/2014    ETOH abuse 8/13/2014    ETOH abuse     GERD (gastroesophageal reflux disease)     Hyperlipidemia     Hypertension     Medical non-compliance 8/13/2014    Medtronic BiV ICD implanted 9/18/14 [facility] 10/8/2014    Medtronic BiV ICD-no atrial lead 10/8/2014    Severe malnutrition ([address]      Past Surgical History:   Procedure Laterality Date    CARDIAC DEFIBRILLATOR PLACEMENT  2005    PACEMAKER INSERTION  2005    [place] FOOT/TOES SURGERY PROC UNLISTED Right 11/16/2018    RIGHT FOOT 1ST MTP JOINT ARTHRODESIS performed by Ced Etaon MD at [facility]        VITALS:  /60 (Site: Left Upper Arm, Position: Sitting, Cuff Size: Large Adult)   Pulse 78   Wt 163 lb 3.2 oz (74 kg)   SpO2 98%   BMI 26.34 kg/m²   Wt Readings from Last 3 Encounters:   05/11/21 163 lb 3.2 oz (74 kg)   03/22/21 166 lb (75.3 kg)   12/03/20 157 lb (71.2 kg)     Body mass index is 26.34 kg/m².        General Appearance: alert and cooperative with exam, appears comfortable, no distress  Lungs: Air entry diminished, poor effort, no crackles or rales, no use of accessory muscles  Heart: S1, S2 heard  GI: soft, non-tender, no guarding  Extremities: No sig LE edema     Medications:  Current Outpatient Medications   Medication Sig Dispense Refill    traMADol (ULTRAM) 50 MG tablet       bumetanide (BUMEX) 2 MG tablet Take 1 tablet by mouth daily 90 tablet 3    irbesartan (AVAPRO) 300 MG tablet TAKE 1 TABLET EVERY NIGHT 90 tablet 1    sodium chloride 1 g tablet Take 2 tablets by mouth 2 times daily 120 tablet 3    potassium chloride (KLOR-CON M) 10 MEQ extended release tablet Take 1 tablet by mouth daily 90 tablet 3    bethanechol (URECHOLINE) 25 MG tablet Take 1 tablet by mouth 3 times daily (Patient taking differently: Take 25 mg by mouth 2 times daily Takes 1.5 bid) 90 tablet 3    vitamin B-1 (THIAMINE) 100 MG tablet Take 100 mg by mouth daily      folic acid (FOLVITE) 1 MG tablet Take 1 mg by mouth daily      Multiple Vitamins-Minerals (THERAPEUTIC MULTIVITAMIN-MINERALS) tablet Take 1 tablet by mouth daily      tamsulosin (FLOMAX) 0.4 MG capsule Take 1 capsule by mouth daily 30 capsule 5    cetirizine (ZYRTEC) 10 MG tablet Take 10 mg by mouth daily      atorvastatin (LIPITOR) 80 MG tablet Take 80 mg by mouth nightly      nitroGLYCERIN (NITROSTAT) 0.4 MG SL tablet PLACE 1 TABLET UNDER THE TONGUE EVERY 5 MINUTES AS NEEDED FOR CHEST PAIN UP TO MAX OF 3 TOTAL DOSES. IF NO RELIEF AFTER 1 DOSE, CALL 911. 25 tablet 3    aspirin 81 MG EC tablet Take 81 mg by mouth daily.  gabapentin (NEURONTIN) 100 MG capsule Take 1 capsule by mouth 3 times daily for 360 days. 270 capsule 3     No current facility-administered medications for this visit. Laboratory & Diagnostics:  Echo: EF 55%  Na 122, K 5.3, creat 1.2  May 2019: Na 133, K 4.7, creat 1.5, BNP 5218  UA: no blood, no protein, UPCR <94 mg/g  US: R 8.9, L 7.2, bladder wall thickening    June 2019: Na 122, K 4.6  Nov 30 2020: Na 130, K 4.3, Creat 1.6  May 2021: Na 132, creat 1.4, Hb 13.2     Impression/Plan:   1. Hyponatremia: Sodium 132. Multiple factors to consider including excessive alcohol intake/beer potomania and hypervolemia. Has HF with preserved EF. Pt has no plans to quit drinking. Advised I could decrease salt tablets if he cuts back on alcohol. However, at this time, patient does not want to change his alcohol habits. Very noncompliant with recommendations. He does not want to change anything. Does not want to \"see so many doctors. \"  Discussed harmful side effects of heavy alcohol abuse. Discussed complications that can arise from hyponatremia. Patient does not want to frequent appointments. He wants to come back in 6 months or longer. Will repeat labs in 3 months to which he is agreeable. 2. CKD III: creat is 1.4. stable. Overall stable  3. Hx CAD s/p stents  4. CHF/HFpEF: Advised fluid restriction. On Bumex. 5. Atrial Fibrillation  6. Excessive alcohol consumption: as above, strongly advised reduction. Discussed risks of organ failure such as liver failure from chronic alcohol use.    7. Bladder wall thickening/urinary retention:  following with urology at [facility]    Discussed with patient in detail  Discussed with son    Orders Placed This Encounter   Procedures    Basic Metabolic Panel    Basic Metabolic Panel     Return in about 6 months (around 11/11/2021).     Gilmer Hernandez MD  [facility]

## 2021-05-18 ENCOUNTER — PROCEDURE VISIT (OUTPATIENT)
Dept: CARDIOLOGY CLINIC | Age: 71
End: 2021-05-18
Payer: MEDICARE

## 2021-05-18 DIAGNOSIS — I50.42 CHF (CONGESTIVE HEART FAILURE), NYHA CLASS II, CHRONIC, COMBINED (HCC): Primary | ICD-10-CM

## 2021-05-18 PROCEDURE — G2066 INTER DEVC REMOTE 30D: HCPCS | Performed by: NUCLEAR MEDICINE

## 2021-05-18 PROCEDURE — 93297 REM INTERROG DEV EVAL ICPMS: CPT | Performed by: NUCLEAR MEDICINE

## 2021-06-15 ENCOUNTER — OFFICE VISIT (OUTPATIENT)
Dept: CARDIOLOGY CLINIC | Age: 71
End: 2021-06-15
Payer: MEDICARE

## 2021-06-15 VITALS
WEIGHT: 155.2 LBS | SYSTOLIC BLOOD PRESSURE: 126 MMHG | BODY MASS INDEX: 25.05 KG/M2 | HEART RATE: 80 BPM | DIASTOLIC BLOOD PRESSURE: 60 MMHG

## 2021-06-15 DIAGNOSIS — Z98.890 S/P MITRAL VALVE CLIP IMPLANTATION: ICD-10-CM

## 2021-06-15 DIAGNOSIS — I10 ESSENTIAL HYPERTENSION: ICD-10-CM

## 2021-06-15 DIAGNOSIS — I42.0 DILATED CARDIOMYOPATHY (HCC): Primary | ICD-10-CM

## 2021-06-15 DIAGNOSIS — Z95.818 S/P MITRAL VALVE CLIP IMPLANTATION: ICD-10-CM

## 2021-06-15 PROCEDURE — G8417 CALC BMI ABV UP PARAM F/U: HCPCS | Performed by: NUCLEAR MEDICINE

## 2021-06-15 PROCEDURE — 3017F COLORECTAL CA SCREEN DOC REV: CPT | Performed by: NUCLEAR MEDICINE

## 2021-06-15 PROCEDURE — 99213 OFFICE O/P EST LOW 20 MIN: CPT | Performed by: NUCLEAR MEDICINE

## 2021-06-15 PROCEDURE — 4040F PNEUMOC VAC/ADMIN/RCVD: CPT | Performed by: NUCLEAR MEDICINE

## 2021-06-15 PROCEDURE — 1036F TOBACCO NON-USER: CPT | Performed by: NUCLEAR MEDICINE

## 2021-06-15 PROCEDURE — G8427 DOCREV CUR MEDS BY ELIG CLIN: HCPCS | Performed by: NUCLEAR MEDICINE

## 2021-06-15 PROCEDURE — 1123F ACP DISCUSS/DSCN MKR DOCD: CPT | Performed by: NUCLEAR MEDICINE

## 2021-06-15 RX ORDER — IRBESARTAN 300 MG/1
TABLET ORAL
Qty: 90 TABLET | Refills: 3 | Status: SHIPPED | OUTPATIENT
Start: 2021-06-15

## 2021-06-15 NOTE — PROGRESS NOTES
[address] [address] [address] [facility],[address]Th Floor [address] [facility]  Dept: [phone]  Dept Fax: [phone]  Loc: [phone]    Visit Date: 6/15/2021    Aure Dinero is a 70 y.o. male who presents todayfor:  Chief Complaint   Patient presents with    Cardiomyopathy    Coronary Artery Disease    Hypertension     Had a mitral clip   Feels better  No chest pain   No changes in breathing  Bp is stable   no dizziness  No syncope  On statins  Still drinking heavy     HPI:  HPI  Past Medical History:   Diagnosis Date    CHARISSA (acute kidney injury) ([address]     Arthritis     general    Blood circulation, collateral     CAD (coronary artery disease)     Cardiomyopathy ([address] 8/13/2014    CHF with cardiomyopathy ([address] 2/4/2019    Chronic atrial fibrillation ([address] 8/13/2014    Chronic kidney disease     Congenital heart disease     Deafness congenital 8/13/2014    ETOH abuse 8/13/2014    ETOH abuse     GERD (gastroesophageal reflux disease)     Hyperlipidemia     Hypertension     Medical non-compliance 8/13/2014    Medtronic BiV ICD implanted 9/18/14 [facility] 10/8/2014    Medtronic BiV ICD-no atrial lead 10/8/2014    Severe malnutrition ([address]       Past Surgical History:   Procedure Laterality Date    CARDIAC DEFIBRILLATOR PLACEMENT  2005    PACEMAKER INSERTION  2005    [place] FOOT/TOES SURGERY PROC UNLISTED Right 11/16/2018    RIGHT FOOT 1ST MTP JOINT ARTHRODESIS performed by Hui Alonzo MD at [address] SURGERY  2011    PVP     Family History   Problem Relation Age of Onset    Diabetes Mother     Arthritis Mother     Heart Disease Father     High Blood Pressure Father     High Cholesterol Father     Arthritis Father      Social History     Tobacco Use    Smoking status: Never Smoker    Smokeless tobacco: Never Used   Substance Use Topics    Alcohol use:  Yes     Alcohol/week: 8.0 standard drinks     Types: 8 Cans of beer per week (Season Ended) 09/01/2021    Potassium monitoring  05/06/2022    Creatinine monitoring  05/06/2022    Hepatitis A vaccine  Aged Out    Hepatitis B vaccine  Aged Out    Hib vaccine  Aged Out    Meningococcal (ACWY) vaccine  Aged Out       Subjective:  Review of Systems  General:   No fever, no chills, No fatigue or weight loss  Pulmonary:    some dyspnea, no wheezing  Cardiac:    Denies recent chest pain,   GI:     No nausea or vomiting, no abdominal pain  Neuro:    No dizziness or light headedness,   Musculoskeletal:  No recent active issues  Extremities:   No edema, no obvious claudication       Objective:  Physical Exam  /60   Pulse 80   Wt 155 lb 3.2 oz (70.4 kg)   BMI 25.05 kg/m²   General:   Well developed, well nourished  Lungs:   Clear to auscultation  Heart:    Normal S1 S2, Slight murmur. no rubs, no gallops  Abdomen:   Soft, non tender, no organomegalies, positive bowel sounds  Extremities:   No edema, no cyanosis, good peripheral pulses  Neurological:   Awake, alert, oriented. No obvious focal deficits  Musculoskelatal:  No obvious deformities    Assessment:      Diagnosis Orders   1. Dilated cardiomyopathy ([address]     2. S/P mitral valve clip implantation     3. Essential hypertension     as above  Cardiac fair for now     Plan:  No follow-ups on file. As above  Continue risk factor modification and medical management  Thank you for allowing me to participate in the care of your patient. Please don't hesitate to contact me regarding any further issues related to the patient care    Orders Placed:  No orders of the defined types were placed in this encounter. Medications Prescribed:  No orders of the defined types were placed in this encounter. Discussed use, benefit, and side effects of prescribed medications. All patient questions answered. Pt voicedunderstanding. Instructed to continue current medications, diet and exercise.  Continue risk factor modification and medical management. Patient agreed with treatment plan. Follow up as directed.     Electronically signedby Mauri Jimenez MD on 6/15/2021 at 2:38 PM

## 2021-06-22 ENCOUNTER — PROCEDURE VISIT (OUTPATIENT)
Dept: CARDIOLOGY CLINIC | Age: 71
End: 2021-06-22
Payer: MEDICARE

## 2021-06-22 DIAGNOSIS — I50.42 CHF (CONGESTIVE HEART FAILURE), NYHA CLASS II, CHRONIC, COMBINED (HCC): Primary | ICD-10-CM

## 2021-06-22 PROCEDURE — G2066 INTER DEVC REMOTE 30D: HCPCS | Performed by: NUCLEAR MEDICINE

## 2021-06-22 PROCEDURE — 93297 REM INTERROG DEV EVAL ICPMS: CPT | Performed by: NUCLEAR MEDICINE

## 2021-06-22 NOTE — PROGRESS NOTES
[facility] Med[facility] Opt[facility]ol  Pt of Baki    Battery 8 months -- battery watch    Optivol WNL    Episodes:   5/28/21 2 seconds/7 beats ? VT rate 217

## 2021-06-29 ENCOUNTER — TELEPHONE (OUTPATIENT)
Dept: CARDIOLOGY CLINIC | Age: 71
End: 2021-06-29

## 2021-06-29 NOTE — TELEPHONE ENCOUNTER
Pre op Risk Assessment     Procedure Lumbar Epidural  Physician OIO  Date of surgery/procedure TBD    Last OV 6-15-21  Last Stress 8-5-20  Last Echo 12-2-20  Last Cath 11-3-20 Mitraclip  Last Stent 11-13-17  Is patient on blood thinners ASA  Hold Meds/how many days ?     Fax- [phone]

## 2021-07-27 ENCOUNTER — PROCEDURE VISIT (OUTPATIENT)
Dept: CARDIOLOGY CLINIC | Age: 71
End: 2021-07-27

## 2021-07-27 DIAGNOSIS — Z95.810 BIVENTRICULAR IMPLANTABLE CARDIOVERTER-DEFIBRILLATOR IN SITU: Primary | ICD-10-CM

## 2021-07-27 PROCEDURE — 93296 REM INTERROG EVL PM/IDS: CPT | Performed by: NUCLEAR MEDICINE

## 2021-07-27 PROCEDURE — 93295 DEV INTERROG REMOTE 1/2/MLT: CPT | Performed by: NUCLEAR MEDICINE

## 2021-07-27 NOTE — PROGRESS NOTES
[employer] Medtronic BiV ICD --- Carelink every month  Patient of Baki    Battery 7 months- battery watch    Presenting rhythm BV    A Impedance - > 3000 -- atrial lead off  RV Impedance 399  LV Impedance 323    RV shock 68    P wave sensing -  R wave sensing 17.9    A Threshold - @ -  A Amplitude - @ -  RV Thresholds 0.750 @ 0.40  RV Amplitude 2.0 @ 0.40  LV Thresholds 0.750 @ 0.40  LV Amplitude 2.25 @ 0.40    A Paced 0%  V Paced 91.1%    Programmed Mode VVIR     Afib Celina   %    Episodes : none    Optivol WNL

## 2021-08-31 ENCOUNTER — PROCEDURE VISIT (OUTPATIENT)
Dept: CARDIOLOGY CLINIC | Age: 71
End: 2021-08-31
Payer: MEDICARE

## 2021-08-31 DIAGNOSIS — I50.42 CHF (CONGESTIVE HEART FAILURE), NYHA CLASS II, CHRONIC, COMBINED (HCC): Primary | ICD-10-CM

## 2021-08-31 PROCEDURE — 93297 REM INTERROG DEV EVAL ICPMS: CPT | Performed by: NUCLEAR MEDICINE

## 2021-08-31 PROCEDURE — G2066 INTER DEVC REMOTE 30D: HCPCS | Performed by: NUCLEAR MEDICINE

## 2021-10-05 ENCOUNTER — TELEPHONE (OUTPATIENT)
Dept: CARDIOLOGY CLINIC | Age: 71
End: 2021-10-05

## 2021-10-05 ENCOUNTER — PROCEDURE VISIT (OUTPATIENT)
Dept: CARDIOLOGY CLINIC | Age: 71
End: 2021-10-05
Payer: MEDICARE

## 2021-10-05 DIAGNOSIS — I50.42 CHF (CONGESTIVE HEART FAILURE), NYHA CLASS II, CHRONIC, COMBINED (HCC): Primary | ICD-10-CM

## 2021-10-05 PROCEDURE — 93298 REM INTERROG DEV EVAL SCRMS: CPT | Performed by: NUCLEAR MEDICINE

## 2021-10-05 PROCEDURE — 93297 REM INTERROG DEV EVAL ICPMS: CPT | Performed by: NUCLEAR MEDICINE

## 2021-10-05 NOTE — TELEPHONE ENCOUNTER
Yearly appointment cancelled due to patient being on battery watch. Updated Trav Gloria, patient's daughter. Letter typed and sent to patient.

## 2021-10-05 NOTE — LETTER
[address] [place] [address] [address]  [place] [address]  Phone: [phone]  Fax: [phone]        October 5, 2021    [address] [place], [place] [id number] [facility]      Dear Angus Rea: This letter is to inform you that your yearly defibrillator appointment has been cancelled for 11/16/2021 at 11AM due to your device being on battery watch. We will continue to watch your battery from your bedside monitor monthly at home. As of today, 10/5/21, you have approximately 5 months left on your battery. Once WOODY (estimated replacement indicator) is triggered in your device, we will call you and get your battery change out scheduled. Once WOODY is reached, you will have 2-3 months of battery life left. If you have any questions or concerns, please don't hesitate to call.     Sincerely,    [employer]  [phone]

## 2021-10-05 NOTE — PROGRESS NOTES
[employer] Medtronic BiV ICD Optivol  Pt of Baki    Battery 5 months    Optivol WNL       Appointment for Nov 16, 2021 cancelled due to battery watch. [place], daughter, made aware. Letter typed, new schedule made and mailed to patient.

## 2021-10-21 ENCOUNTER — TELEPHONE (OUTPATIENT)
Dept: CARDIOLOGY CLINIC | Age: 71
End: 2021-10-21

## 2021-10-21 DIAGNOSIS — I34.0 NONRHEUMATIC MITRAL VALVE REGURGITATION: Primary | ICD-10-CM

## 2021-10-21 NOTE — TELEPHONE ENCOUNTER
Orders received from Dr. Berhane Chacko to schedule this patient for a CBC, BMP and an ECHO prior to the 1 year post MitraClip follow up appointment scheduled on 11/3//2021.

## 2021-11-03 ENCOUNTER — HOSPITAL ENCOUNTER (OUTPATIENT)
Age: 71
Discharge: HOME | End: 2021-11-03
Payer: MEDICARE

## 2021-11-03 ENCOUNTER — OFFICE VISIT (OUTPATIENT)
Dept: CARDIOLOGY CLINIC | Age: 71
End: 2021-11-03
Payer: MEDICARE

## 2021-11-03 ENCOUNTER — HOSPITAL ENCOUNTER (OUTPATIENT)
Dept: NON INVASIVE DIAGNOSTICS | Age: 71
Discharge: HOME | End: 2021-11-03
Payer: MEDICARE

## 2021-11-03 VITALS
HEIGHT: 66 IN | BODY MASS INDEX: 23.24 KG/M2 | SYSTOLIC BLOOD PRESSURE: 94 MMHG | DIASTOLIC BLOOD PRESSURE: 50 MMHG | WEIGHT: 144.6 LBS | HEART RATE: 70 BPM

## 2021-11-03 DIAGNOSIS — Z98.890 S/P MITRAL VALVE CLIP IMPLANTATION: ICD-10-CM

## 2021-11-03 DIAGNOSIS — I34.0 NONRHEUMATIC MITRAL VALVE REGURGITATION: ICD-10-CM

## 2021-11-03 DIAGNOSIS — I50.9 CHF WITH CARDIOMYOPATHY (HCC): Primary | ICD-10-CM

## 2021-11-03 DIAGNOSIS — Z95.818 S/P MITRAL VALVE CLIP IMPLANTATION: ICD-10-CM

## 2021-11-03 DIAGNOSIS — I42.9 CHF WITH CARDIOMYOPATHY (HCC): Primary | ICD-10-CM

## 2021-11-03 LAB
ANION GAP SERPL CALCULATED.3IONS-SCNC: 14 MEQ/L (ref 8–16)
BUN BLDV-MCNC: 9 MG/DL (ref 7–22)
CALCIUM SERPL-MCNC: 9.7 MG/DL (ref 8.5–10.5)
CHLORIDE BLD-SCNC: 96 MEQ/L (ref 98–111)
CO2: 21 MEQ/L (ref 23–33)
CREAT SERPL-MCNC: 1.1 MG/DL (ref 0.4–1.2)
ERYTHROCYTE [DISTWIDTH] IN BLOOD BY AUTOMATED COUNT: 12.6 % (ref 11.5–14.5)
ERYTHROCYTE [DISTWIDTH] IN BLOOD BY AUTOMATED COUNT: 48.4 FL (ref 35–45)
GFR SERPL CREATININE-BSD FRML MDRD: 66 ML/MIN/1.73M2
GLUCOSE BLD-MCNC: 73 MG/DL (ref 70–108)
HCT VFR BLD CALC: 42.7 % (ref 42–52)
HEMOGLOBIN: 14.2 GM/DL (ref 14–18)
LV EF: 58 %
LVEF MODALITY: NORMAL
MCH RBC QN AUTO: 34.5 PG (ref 26–33)
MCHC RBC AUTO-ENTMCNC: 33.3 GM/DL (ref 32.2–35.5)
MCV RBC AUTO: 103.9 FL (ref 80–94)
PLATELET # BLD: 332 THOU/MM3 (ref 130–400)
PMV BLD AUTO: 9.7 FL (ref 9.4–12.4)
POTASSIUM SERPL-SCNC: 4.5 MEQ/L (ref 3.5–5.2)
RBC # BLD: 4.11 MILL/MM3 (ref 4.7–6.1)
SODIUM BLD-SCNC: 131 MEQ/L (ref 135–145)
WBC # BLD: 6 THOU/MM3 (ref 4.8–10.8)

## 2021-11-03 PROCEDURE — 4040F PNEUMOC VAC/ADMIN/RCVD: CPT | Performed by: INTERNAL MEDICINE

## 2021-11-03 PROCEDURE — 99213 OFFICE O/P EST LOW 20 MIN: CPT | Performed by: INTERNAL MEDICINE

## 2021-11-03 PROCEDURE — 36415 COLL VENOUS BLD VENIPUNCTURE: CPT

## 2021-11-03 PROCEDURE — G8427 DOCREV CUR MEDS BY ELIG CLIN: HCPCS | Performed by: INTERNAL MEDICINE

## 2021-11-03 PROCEDURE — 93000 ELECTROCARDIOGRAM COMPLETE: CPT | Performed by: INTERNAL MEDICINE

## 2021-11-03 PROCEDURE — 93306 TTE W/DOPPLER COMPLETE: CPT

## 2021-11-03 PROCEDURE — 3017F COLORECTAL CA SCREEN DOC REV: CPT | Performed by: INTERNAL MEDICINE

## 2021-11-03 PROCEDURE — 85027 COMPLETE CBC AUTOMATED: CPT

## 2021-11-03 PROCEDURE — G8484 FLU IMMUNIZE NO ADMIN: HCPCS | Performed by: INTERNAL MEDICINE

## 2021-11-03 PROCEDURE — 1123F ACP DISCUSS/DSCN MKR DOCD: CPT | Performed by: INTERNAL MEDICINE

## 2021-11-03 PROCEDURE — G8420 CALC BMI NORM PARAMETERS: HCPCS | Performed by: INTERNAL MEDICINE

## 2021-11-03 PROCEDURE — 1036F TOBACCO NON-USER: CPT | Performed by: INTERNAL MEDICINE

## 2021-11-03 PROCEDURE — 80048 BASIC METABOLIC PNL TOTAL CA: CPT

## 2021-11-03 NOTE — PROGRESS NOTES
[facility] [address]  [place] [address]  Dept: [phone]  Dept Fax: [phone]  Loc: [phone]    Visit Date: 11/3/2021    Mr. Miquel Frankel is a 70 y.o. male  who presented for:  Chief Complaint   Patient presents with    1 Year Follow Up    Cardiomyopathy    Congestive Heart Failure       HPI:   HPI   79 y.o. male with BiV ICD who is s/p mitral clipping x2 on 11/3/20 for severe mitral regurg who presents for f/u. Breathing improved, no chest pain, can do ADLs. Some knee issues limit stairs. No ICD shocks. Taking all meds. BP 94/50. Usually better at home.         Current Outpatient Medications:     irbesartan (AVAPRO) 300 MG tablet, TAKE 1 TABLET EVERY NIGHT, Disp: 90 tablet, Rfl: 3    bumetanide (BUMEX) 2 MG tablet, Take 1 tablet by mouth daily, Disp: 90 tablet, Rfl: 3    sodium chloride 1 g tablet, Take 2 tablets by mouth 2 times daily, Disp: 120 tablet, Rfl: 3    potassium chloride (KLOR-CON M) 10 MEQ extended release tablet, Take 1 tablet by mouth daily, Disp: 90 tablet, Rfl: 3    bethanechol (URECHOLINE) 25 MG tablet, Take 1 tablet by mouth 3 times daily (Patient taking differently: Take 25 mg by mouth 2 times daily Takes 1.5 bid), Disp: 90 tablet, Rfl: 3    vitamin B-1 (THIAMINE) 100 MG tablet, Take 100 mg by mouth daily, Disp: , Rfl:     folic acid (FOLVITE) 1 MG tablet, Take 1 mg by mouth daily, Disp: , Rfl:     Multiple Vitamins-Minerals (THERAPEUTIC MULTIVITAMIN-MINERALS) tablet, Take 1 tablet by mouth daily, Disp: , Rfl:     tamsulosin (FLOMAX) 0.4 MG capsule, Take 1 capsule by mouth daily, Disp: 30 capsule, Rfl: 5    cetirizine (ZYRTEC) 10 MG tablet, Take 10 mg by mouth daily, Disp: , Rfl:     atorvastatin (LIPITOR) 80 MG tablet, Take 80 mg by mouth nightly, Disp: , Rfl:     nitroGLYCERIN (NITROSTAT) 0.4 MG SL tablet, PLACE 1 TABLET UNDER THE TONGUE EVERY 5 MINUTES AS NEEDED FOR CHEST PAIN UP TO MAX OF 3 TOTAL DOSES. IF NO RELIEF AFTER 1 DOSE, CALL 911., Disp: 25 tablet, Rfl: 3    aspirin 81 MG EC tablet, Take 81 mg by mouth daily. , Disp: , Rfl:     gabapentin (NEURONTIN) 100 MG capsule, Take 1 capsule by mouth 3 times daily for 360 days. , Disp: 270 capsule, Rfl: 3    Past Medical History  Dmitriy  has a past medical history of CHARISSA (acute kidney injury) ([facility] Arthritis, Blood circulation, collateral, CAD (coronary artery disease), Cardiomyopathy ([facility] CHF with cardiomyopathy ([facility] [facility] Chronic atrial fibrillation ([facility] [facility] Chronic kidney disease, Congenital heart disease, Deafness congenital, ETOH abuse, ETOH abuse, GERD (gastroesophageal reflux disease), Hyperlipidemia, Hypertension, Medical non-compliance, Medtronic BiV ICD implanted 9/18/14 [facility], Medtronic BiV ICD-no atrial lead, and Severe malnutrition ([facility] Social History  Dmitriy  reports that he has never smoked. He has never used smokeless tobacco. He reports current alcohol use of about 8.0 standard drinks of alcohol per week. He reports that he does not use drugs. Family History  Dmitriy family history includes Arthritis in his father and mother; Diabetes in his mother; Heart Disease in his father; High Blood Pressure in his father; High Cholesterol in his father. There is no family history of bicuspid aortic valve, aneurysms, heart transplant, pacemakers, defibrillators, or sudden cardiac death. Past Surgical History   Past Surgical History:   Procedure Laterality Date    CARDIAC DEFIBRILLATOR PLACEMENT  2005    PACEMAKER INSERTION  2005    [place] FOOT/TOES SURGERY PROC UNLISTED Right 11/16/2018    RIGHT FOOT 1ST MTP JOINT ARTHRODESIS performed by Nabor Mace MD at [address] [place] [address]  2011    PVP       Review of Systems   Constitutional: Negative for chills and fever  HENT: Negative for congestion, sinus pressure, sneezing and sore throat. Eyes: Negative for pain, discharge, redness and itching.    Respiratory: Negative for apnea, cough  Gastrointestinal: Negative for blood in stool, constipation, diarrhea   Endocrine: Negative for cold intolerance, heat intolerance, polydipsia. Genitourinary: Negative for dysuria, enuresis, flank pain and hematuria. Musculoskeletal: Negative for arthralgias, joint swelling and neck pain. Neurological: Negative for numbness and headaches. Psychiatric/Behavioral: Negative for agitation, confusion, decreased concentration and dysphoric mood. Objective:     BP (!) 94/50   Pulse 70   Ht 5' 6\" (1.676 m)   Wt 144 lb 9.6 oz (65.6 kg)   BMI 23.34 kg/m²     Wt Readings from Last 3 Encounters:   11/03/21 144 lb 9.6 oz (65.6 kg)   06/15/21 155 lb 3.2 oz (70.4 kg)   05/11/21 163 lb 3.2 oz (74 kg)     BP Readings from Last 3 Encounters:   11/03/21 (!) 94/50   06/15/21 126/60   05/11/21 120/60       Nursing note and vitals reviewed. Physical Exam   Constitutional: Oriented to person, place, and time. Appears well-developed and well-nourished. HENT:   Head: Normocephalic and atraumatic. Eyes: EOM are normal. Pupils are equal, round, and reactive to light. Neck: Normal range of motion. Neck supple. No JVD present. Cardiovascular: Normal rate, regular rhythm, normal heart sounds and intact distal pulses. No murmur heard. Pulmonary/Chest: Effort normal and breath sounds normal. No respiratory distress. No wheezes. No rales. Abdominal: Soft. Bowel sounds are normal. No distension. There is no tenderness. Musculoskeletal: Normal range of motion. No edema. Neurological: Alert and oriented to person, place, and time. No cranial nerve deficit. Coordination normal.   Skin: Skin is warm and dry. Psychiatric: Normal mood and affect.        No results found for: CKTOTAL, CKMB, CKMBINDEX    Lab Results   Component Value Date    WBC 6.0 11/03/2021    RBC 4.11 11/03/2021    HGB 14.2 11/03/2021    HCT 42.7 11/03/2021    .9 11/03/2021    MCH 34.5 11/03/2021    MCHC 33.3 11/03/2021    RDW 14.9 11/14/2017     11/03/2021    MPV 9.7 11/03/2021       Lab Results   Component Value Date     11/03/2021    K 4.5 11/03/2021    K 5.3 09/14/2020    CL 96 11/03/2021    CO2 21 11/03/2021    BUN 9 11/03/2021    LABALBU 3.6 11/03/2020    CREATININE 1.1 11/03/2021    CALCIUM 9.7 11/03/2021    LABGLOM 66 11/03/2021    GLUCOSE 73 11/03/2021       Lab Results   Component Value Date    ALKPHOS 120 11/03/2020    ALT 8 11/03/2020    AST 13 11/03/2020    PROT 6.6 11/03/2020    BILITOT 0.3 11/03/2020    BILIDIR <0.2 02/04/2019    LABALBU 3.6 11/03/2020       Lab Results   Component Value Date    MG 2.0 10/27/2020       Lab Results   Component Value Date    INR 1.19 (H) 11/03/2020    INR 1.03 09/14/2020    INR 0.97 11/13/2017         Lab Results   Component Value Date    LABA1C 4.4 03/04/2017       Lab Results   Component Value Date    TRIG 79 02/05/2019    HDL 40 02/05/2019    LDLCALC 31 02/05/2019       Lab Results   Component Value Date    TSH 3.35 05/03/2019         Testing Reviewed:      I have individually reviewed the cardiac test below:    ECHO: Results for orders placed in visit on 12/02/20    ECHO Complete 2D W Doppler W Color       Assessment/Plan    S/p Mitral Clip x2 for severe symptomatic mitral regurgitation, NYHA I  BiV ICD   Chronic diastolic CHF with preserved EF   Congenital deaf/muteness communicated via sign language   HTN - now hypotensive  HLD   Hx of PCI of LAD and RCA   Alcohol use  Await 1 year MC TTE,  Had mild-moderate MR, but no further clips can be placed due to gradient. Follow BP. If low, cut back ARB. No volume on exam.  Discussed diet/exercise/BP/weight loss/health lifestyle choices/lipids; the patient understands the goals and will try to comply.     Disposition:  prn         Electronically signed by Devorah Raphael MD   11/3/2021 at 1:44 PM EDT

## 2021-11-09 ENCOUNTER — TELEPHONE (OUTPATIENT)
Dept: CARDIOLOGY CLINIC | Age: 71
End: 2021-11-09

## 2021-11-09 NOTE — TELEPHONE ENCOUNTER
Benito Stauffer    The following questions refer to your heart failure and how it may affect your life. Please read and complete the following questions. There is no right or wrong answers. Please evonne the answer that best applies to you. 1. Heart failure affects different people in different ways. Some feel shortness of breath while others feel fatigue. Please indicate how much you are limited by heart failure (shortness of breath or fatigue) in your ability to do the following activities over the past 2 weeks. Activity Extremely Limited Quite a bit limited Moderatly Limited Slightly Limited Not at all Limited Limited for other reasons/ did not do the activity   Showering/ Bathing          x    Walking 1 block on Level ground  x       Hurrying or jogging (as if to catch a bus)      x        1         2       3  4       5   6     2. Over the past 2 weeks, how many times did you have swelling in your feet, ankles or legs when you woke up in the morning? Every morning 3 or more times per week, but not every day 1-2 times per week Less than once a week Never over the past 2 weeks         x   `        1          2   3       4                     5           3. Over the past 2 weeks, on average, how many times has fatigue limited your ability to do what you wanted? All of the time Several times per day At least once a day 3 or more times per week 1-2 times per week Less than once a week Never over the past 2 weeks         x              1        2     3  4        5       6  7        4. Over the past 2 weeks, on average, how many times has shortness of breath limited your ability to do what you    wanted? All of the time Several times per day At least once a day 3 or more times per week 1-2 times per week Less than once a week Never over the past 2 weeks           x            1        2     3  4         5       6  7     5.  Over the past 2 weeks, on average, how many times have you been forced to sleep sitting up in a chair or with at least 3 pillows to prop you up because of shortness of breath? Every night 3 or more times per week, but not every day 1-2 times per week Less than once a week Never over the past 2 weeks     x       `        1          2   3       4                     5        6.  Over the past 2 weeks, how much has your heart failure limited your enjoyment of life? It has extremely limited my enjoyment of life It has limited my enjoyment of life quite a bit It has moderately limited my enjoyment of life It has slightly limited my enjoyment of life It has not limited my enjoyment of life         x         1          2   3       4           5     7.  If you had to spend the rest of your life with your heart failure the way it is right now, how would you feel about this? Not at all satisfied Mostly dissatisfied Somewhat satisfied Mostly satisfied Completely satisfied         x              1                         2                            3                        4                            5        8. How much does your heart failure affect your lifestyle?  Please indicate how your heart failure may have limited your participation in the following activities over the past 2 weeks    Activity Severely limited Limited quite a bit Moderately limited Slightly limited Did not limit at all Does not apply or did not do this activity   Hobbies, recreational activities     x    Working or doing household chores     x    Visiting family or friends out of your home     x                1      2   3         4       5            6

## 2021-11-10 ENCOUNTER — PROCEDURE VISIT (OUTPATIENT)
Dept: CARDIOLOGY CLINIC | Age: 71
End: 2021-11-10
Payer: MEDICARE

## 2021-11-10 DIAGNOSIS — Z95.810 BIVENTRICULAR IMPLANTABLE CARDIOVERTER-DEFIBRILLATOR IN SITU: Primary | ICD-10-CM

## 2021-11-10 PROCEDURE — 93296 REM INTERROG EVL PM/IDS: CPT | Performed by: NUCLEAR MEDICINE

## 2021-11-10 PROCEDURE — 93295 DEV INTERROG REMOTE 1/2/MLT: CPT | Performed by: NUCLEAR MEDICINE

## 2021-11-10 NOTE — PROGRESS NOTES
[employer] Medtronic BiV ICD ---   Patient of [employer]    Battery 5 months    Presenting rhythm BV    A Impedance >3000 -- lead off  RV Impedance 494  LV Impedance 323    RV shock 72    P wave sensing -  R wave sensing 13    A Threshold-- lead off  RV Thresholds 0.625 @ 0.40  RV Amplitude 2.0 @ 0.40  LV Thresholds 1.0 @ 0.40  LV Amplitude 2.0 @ 0.40    A Paced -%  V Paced 95.5%    Programmed Mode VVIR     Afib [place] 0%    Episodes :   10/20/21- 6 & 9 beats VT rate 158-167    Optivol WNL

## 2021-11-12 ENCOUNTER — OFFICE VISIT (OUTPATIENT)
Dept: NEPHROLOGY | Age: 71
End: 2021-11-12
Payer: MEDICARE

## 2021-11-12 VITALS
TEMPERATURE: 96.8 F | BODY MASS INDEX: 23.89 KG/M2 | WEIGHT: 148 LBS | OXYGEN SATURATION: 99 % | SYSTOLIC BLOOD PRESSURE: 114 MMHG | DIASTOLIC BLOOD PRESSURE: 71 MMHG | HEART RATE: 86 BPM

## 2021-11-12 DIAGNOSIS — N18.31 STAGE 3A CHRONIC KIDNEY DISEASE (HCC): ICD-10-CM

## 2021-11-12 DIAGNOSIS — E87.1 HYPONATREMIA: Primary | ICD-10-CM

## 2021-11-12 PROCEDURE — 1123F ACP DISCUSS/DSCN MKR DOCD: CPT | Performed by: INTERNAL MEDICINE

## 2021-11-12 PROCEDURE — 99213 OFFICE O/P EST LOW 20 MIN: CPT | Performed by: INTERNAL MEDICINE

## 2021-11-12 PROCEDURE — 4040F PNEUMOC VAC/ADMIN/RCVD: CPT | Performed by: INTERNAL MEDICINE

## 2021-11-12 PROCEDURE — G8420 CALC BMI NORM PARAMETERS: HCPCS | Performed by: INTERNAL MEDICINE

## 2021-11-12 PROCEDURE — 3017F COLORECTAL CA SCREEN DOC REV: CPT | Performed by: INTERNAL MEDICINE

## 2021-11-12 PROCEDURE — G8484 FLU IMMUNIZE NO ADMIN: HCPCS | Performed by: INTERNAL MEDICINE

## 2021-11-12 PROCEDURE — 1036F TOBACCO NON-USER: CPT | Performed by: INTERNAL MEDICINE

## 2021-11-12 PROCEDURE — G8427 DOCREV CUR MEDS BY ELIG CLIN: HCPCS | Performed by: INTERNAL MEDICINE

## 2021-11-12 RX ORDER — SODIUM CHLORIDE 1000 MG
2 TABLET, SOLUBLE MISCELLANEOUS 2 TIMES DAILY
Qty: 360 TABLET | Refills: 3 | Status: SHIPPED | OUTPATIENT
Start: 2021-11-12

## 2021-11-12 NOTE — PROGRESS NOTES
[facility]    [facility], [address]   [place] [address]  [phone]  Progress Note  11/12/2021 11:51 AM        Pt Name:    Ephraim Chanel  YOB: 1950  Primary Care Physician:  Janette Anderson MD     Chief Complaint:   Chief Complaint   Patient presents with    Follow-Up: Hyponatremia, CKD        Background Information/Interval History:   71 yo white male who was referred to us by heart failure clinic for evaluation of hyponatremia. Patient is deaf and mute. He has CAD s/p stents, HFpEF, CKD, Atrial Fibrillation. He has been on diuretics for CHF.  He has hx of heavy alcohol abuse. Previously was drinking upto 14-16 beers a day and sometimes more. He underwent mitral clip X 2 (Nov 3, 2020). He was also noted to have pericarditis and underwent colchicine treatment. He is here with . He says he feels okay. He does not like to take his diuretics regularly. He also says he does not like to take urecholine. He is still drinking quite a bit of alcohol. He says he feels salt tablets are helping him a lot. He says he is still drinking quite a bit of alcohol. No leg swelling. No shortness of breath. He says he will take more salt tablets if he drinks more. He does not like to take 2 tablets BID. He only takes 1 tab BID.       Past History:  Past Medical History:   Diagnosis Date    CHARISSA (acute kidney injury) ([address]     Arthritis     general    Blood circulation, collateral     CAD (coronary artery disease)     Cardiomyopathy ([address] 8/13/2014    CHF with cardiomyopathy ([address] 2/4/2019    Chronic atrial fibrillation ([address] 8/13/2014    Chronic kidney disease     Congenital heart disease     Deafness congenital 8/13/2014    ETOH abuse 8/13/2014    ETOH abuse     GERD (gastroesophageal reflux disease)     Hyperlipidemia     Hypertension     Medical non-compliance 8/13/2014    Medtronic BiV ICD implanted 9/18/14 [facility] 10/8/2014    Medtronic BiV ICD-no atrial lead 10/8/2014    Severe malnutrition ([address]      Past Surgical History:   Procedure Laterality Date    CARDIAC DEFIBRILLATOR PLACEMENT  2005    PACEMAKER INSERTION  2005    [place] FOOT/TOES SURGERY PROC UNLISTED Right 11/16/2018    RIGHT FOOT 1ST MTP JOINT ARTHRODESIS [facility]    PVP        VITALS:  /71 (Site: Left Upper Arm, Position: Sitting, Cuff Size: Medium Adult)   Pulse 86   Temp 96.8 °F (36 °C)   Wt 148 lb (67.1 kg)   SpO2 99%   BMI 23.89 kg/m²   Wt Readings from Last 3 Encounters:   11/12/21 148 lb (67.1 kg)   11/03/21 144 lb 9.6 oz (65.6 kg)   06/15/21 155 lb 3.2 oz (70.4 kg)     Body mass index is 23.89 kg/m².        General Appearance: alert and cooperative with exam, appears comfortable, no distress  Lungs: Air entry diminished, poor effort, no crackles or rales, no use of accessory muscles  Heart: S1, S2 heard  GI: soft, non-tender, no guarding  Extremities: No sig LE edema     Medications:  Current Outpatient Medications   Medication Sig Dispense Refill    irbesartan (AVAPRO) 300 MG tablet TAKE 1 TABLET EVERY NIGHT 90 tablet 3    bumetanide (BUMEX) 2 MG tablet Take 1 tablet by mouth daily 90 tablet 3    sodium chloride 1 g tablet Take 2 tablets by mouth 2 times daily 120 tablet 3    potassium chloride (KLOR-CON M) 10 MEQ extended release tablet Take 1 tablet by mouth daily 90 tablet 3    bethanechol (URECHOLINE) 25 MG tablet Take 1 tablet by mouth 3 times daily (Patient taking differently: Take 25 mg by mouth 2 times daily Takes 1.5 bid) 90 tablet 3    vitamin B-1 (THIAMINE) 100 MG tablet Take 100 mg by mouth daily      folic acid (FOLVITE) 1 MG tablet Take 1 mg by mouth daily      Multiple Vitamins-Minerals (THERAPEUTIC MULTIVITAMIN-MINERALS) tablet Take 1 tablet by mouth daily      tamsulosin (FLOMAX) 0.4 MG capsule Take 1 capsule by mouth daily 30 capsule 5    cetirizine (ZYRTEC) 10 MG tablet Take 10 mg by mouth daily      atorvastatin (LIPITOR) 80 MG tablet Take 80 mg by mouth nightly      nitroGLYCERIN (NITROSTAT) 0.4 MG SL tablet PLACE 1 TABLET UNDER THE TONGUE EVERY 5 MINUTES AS NEEDED FOR CHEST PAIN UP TO MAX OF 3 TOTAL DOSES. IF NO RELIEF AFTER 1 DOSE, CALL 911. 25 tablet 3    aspirin 81 MG EC tablet Take 81 mg by mouth daily.  gabapentin (NEURONTIN) 100 MG capsule Take 1 capsule by mouth 3 times daily for 360 days. 270 capsule 3     No current facility-administered medications for this visit. Laboratory & Diagnostics:  Echo: EF 55%  Na 122, K 5.3, creat 1.2  May 2019: Na 133, K 4.7, creat 1.5, BNP 5218  UA: no blood, no protein, UPCR <94 mg/g  US: R 8.9, L 7.2, bladder wall thickening    June 2019: Na 122, K 4.6  Nov 30 2020: Na 130, K 4.3, Creat 1.6  May 2021: Na 132, creat 1.4, Hb 13.2    Nov 2021: Na 131, K 4.5, Creat 1.1     Impression/Plan:   1. Hyponatremia: Sodium 132. Multiple factors to consider including excessive alcohol intake/beer potomania and hypervolemia. Has HF with preserved EF. Pt has no plans to quit drinking. Advised I could decrease salt tablets if he cuts back on alcohol. Sodium is 131. He says he feels better with salt tablets. Advised fluid and alcohol reduction. He says he takes salt tablets BID but only one tab BID. Does not want to take two tabs. He is not very compliant with recommendations. 2. CKD III: Overall stable  3. Hx CAD s/p stents  4. CHF/HFpEF: Advised fluid restriction. On Bumex. 5. Atrial Fibrillation  6. Excessive alcohol consumption: as above, strongly advised reduction. 7. Bladder wall thickening/urinary retention:  following with urology at [facility]. He says he will inform urology regarding bethanechol    Orders Placed This Encounter   Procedures    Sodium    Basic Metabolic Panel     Return in about 6 months (around 5/12/2022).     Kati Villa MD  [facility]

## 2021-11-30 ENCOUNTER — TELEPHONE (OUTPATIENT)
Dept: CARDIOLOGY CLINIC | Age: 71
End: 2021-11-30

## 2021-11-30 NOTE — TELEPHONE ENCOUNTER
Benito Thomasboramanda Lozada    The following questions refer to your heart failure and how it may affect your life. Please read and complete the following questions. There is no right or wrong answers. Please evonne the answer that best applies to you. 1. Heart failure affects different people in different ways. Some feel shortness of breath while others feel fatigue. Please indicate how much you are limited by heart failure (shortness of breath or fatigue) in your ability to do the following activities over the past 2 weeks. Activity Extremely Limited Quite a bit limited Moderatly Limited Slightly Limited Not at all Limited Limited for other reasons/ did not do the activity   Showering/ Bathing          x    Walking 1 block on Level ground  x       Hurrying or jogging (as if to catch a bus)      x        1         2       3  4       5   6     2. Over the past 2 weeks, how many times did you have swelling in your feet, ankles or legs when you woke up in the morning? Every morning 3 or more times per week, but not every day 1-2 times per week Less than once a week Never over the past 2 weeks         x   `        1          2   3       4                     5           3. Over the past 2 weeks, on average, how many times has fatigue limited your ability to do what you wanted? All of the time Several times per day At least once a day 3 or more times per week 1-2 times per week Less than once a week Never over the past 2 weeks         x              1        2     3  4        5       6  7        4. Over the past 2 weeks, on average, how many times has shortness of breath limited your ability to do what you    wanted? All of the time Several times per day At least once a day 3 or more times per week 1-2 times per week Less than once a week Never over the past 2 weeks           x            1        2     3  4         5       6  7     5.  Over the past 2 weeks, on average, how many times have you been forced to sleep sitting up in a chair or with at least 3 pillows to prop you up because of shortness of breath? Every night 3 or more times per week, but not every day 1-2 times per week Less than once a week Never over the past 2 weeks   x         `        1          2   3       4                     5        6.  Over the past 2 weeks, how much has your heart failure limited your enjoyment of life? It has extremely limited my enjoyment of life It has limited my enjoyment of life quite a bit It has moderately limited my enjoyment of life It has slightly limited my enjoyment of life It has not limited my enjoyment of life         x         1          2   3       4           5     7.  If you had to spend the rest of your life with your heart failure the way it is right now, how would you feel about this? Not at all satisfied Mostly dissatisfied Somewhat satisfied Mostly satisfied Completely satisfied         x              1                         2                            3                        4                            5        8. How much does your heart failure affect your lifestyle?  Please indicate how your heart failure may have limited your participation in the following activities over the past 2 weeks    Activity Severely limited Limited quite a bit Moderately limited Slightly limited Did not limit at all Does not apply or did not do this activity   Hobbies, recreational activities     x    Working or doing household chores     x    Visiting family or friends out of your home     x                1      2   3         4       5            6            Meter Walk Test  Not needed    Time 1:  seconds  Time 2:  seconds  Time 3:  seconds

## 2021-12-08 ENCOUNTER — TELEPHONE (OUTPATIENT)
Dept: CARDIOLOGY CLINIC | Age: 71
End: 2021-12-08

## 2021-12-08 NOTE — TELEPHONE ENCOUNTER
Pre op Risk Assessment    Procedure lumbar/cervical epidural  Physician Dr. Fauzia Morrison  Date of surgery/procedure 12-    Last OV 6-15-21  Last Stress 8-5-20  Last Echo 11-3-21  Last Cath 9-14-20  Last Stent 11-13-17  Is patient on blood thinners NO

## 2021-12-16 ENCOUNTER — PROCEDURE VISIT (OUTPATIENT)
Dept: CARDIOLOGY CLINIC | Age: 71
End: 2021-12-16
Payer: MEDICARE

## 2021-12-16 DIAGNOSIS — I50.9 CHF WITH CARDIOMYOPATHY (HCC): Primary | ICD-10-CM

## 2021-12-16 DIAGNOSIS — I42.9 CHF WITH CARDIOMYOPATHY (HCC): Primary | ICD-10-CM

## 2021-12-16 PROCEDURE — 93297 REM INTERROG DEV EVAL ICPMS: CPT | Performed by: NUCLEAR MEDICINE

## 2021-12-16 PROCEDURE — 93297 REM INTERROG DEV EVAL ICPMS: CPT | Performed by: INTERNAL MEDICINE

## 2021-12-16 PROCEDURE — G2066 INTER DEVC REMOTE 30D: HCPCS | Performed by: NUCLEAR MEDICINE

## 2021-12-16 PROCEDURE — G2066 INTER DEVC REMOTE 30D: HCPCS | Performed by: INTERNAL MEDICINE

## 2021-12-16 NOTE — PROGRESS NOTES
[employer] Medtronic BiV ICD Optivol  Pt of Baki    Battery 5 months    Optivol trending down. Episodes  11/27/21- 5 beats VT rate 188  11/28/21-? VT ? Oversensing ?  RVR    No short V-Vs

## 2021-12-25 LAB
ALBUMIN SERPL-MCNC: 4.3 G/DL
ALP BLD-CCNC: 79 U/L
ALT SERPL-CCNC: 16 U/L
ANION GAP SERPL CALCULATED.3IONS-SCNC: NORMAL MMOL/L
AST SERPL-CCNC: 24 U/L
BASOPHILS ABSOLUTE: NORMAL
BASOPHILS RELATIVE PERCENT: NORMAL
BILIRUB SERPL-MCNC: 0.6 MG/DL (ref 0.1–1.4)
BUN BLDV-MCNC: 11 MG/DL
CALCIUM SERPL-MCNC: 9.2 MG/DL
CHLORIDE BLD-SCNC: 88 MMOL/L
CO2: 20 MMOL/L
CREAT SERPL-MCNC: 1 MG/DL
EOSINOPHILS ABSOLUTE: NORMAL
EOSINOPHILS RELATIVE PERCENT: NORMAL
GFR CALCULATED: 75
GLUCOSE BLD-MCNC: 122 MG/DL
HCT VFR BLD CALC: 42.3 % (ref 41–53)
HEMOGLOBIN: 15 G/DL (ref 13.5–17.5)
LYMPHOCYTES ABSOLUTE: NORMAL
LYMPHOCYTES RELATIVE PERCENT: NORMAL
MCH RBC QN AUTO: NORMAL PG
MCHC RBC AUTO-ENTMCNC: NORMAL G/DL
MCV RBC AUTO: NORMAL FL
MONOCYTES ABSOLUTE: NORMAL
MONOCYTES RELATIVE PERCENT: NORMAL
NEUTROPHILS ABSOLUTE: NORMAL
NEUTROPHILS RELATIVE PERCENT: NORMAL
PLATELET # BLD: 170 K/ΜL
PMV BLD AUTO: NORMAL FL
POTASSIUM SERPL-SCNC: 4.5 MMOL/L
RBC # BLD: 4.17 10^6/ΜL
SODIUM BLD-SCNC: 125 MMOL/L
TOTAL PROTEIN: 6.8
WBC # BLD: 12.7 10^3/ML

## 2022-01-19 ENCOUNTER — PROCEDURE VISIT (OUTPATIENT)
Dept: CARDIOLOGY CLINIC | Age: 72
End: 2022-01-19
Payer: MEDICARE

## 2022-01-19 DIAGNOSIS — I50.9 CHF WITH CARDIOMYOPATHY (HCC): Primary | ICD-10-CM

## 2022-01-19 DIAGNOSIS — I42.9 CHF WITH CARDIOMYOPATHY (HCC): Primary | ICD-10-CM

## 2022-01-19 PROCEDURE — 93297 REM INTERROG DEV EVAL ICPMS: CPT | Performed by: NUCLEAR MEDICINE

## 2022-01-19 PROCEDURE — G2066 INTER DEVC REMOTE 30D: HCPCS | Performed by: NUCLEAR MEDICINE

## 2022-02-23 ENCOUNTER — PROCEDURE VISIT (OUTPATIENT)
Dept: CARDIOLOGY CLINIC | Age: 72
End: 2022-02-23
Payer: MEDICARE

## 2022-02-23 DIAGNOSIS — Z95.810 BIVENTRICULAR IMPLANTABLE CARDIOVERTER-DEFIBRILLATOR IN SITU: Primary | ICD-10-CM

## 2022-02-23 PROCEDURE — 93295 DEV INTERROG REMOTE 1/2/MLT: CPT | Performed by: NUCLEAR MEDICINE

## 2022-02-23 PROCEDURE — 93296 REM INTERROG EVL PM/IDS: CPT | Performed by: NUCLEAR MEDICINE

## 2022-02-23 NOTE — PROGRESS NOTES
[employer] Medtronic BiV ICD - no atrial lead  Patient of Baki    Battery 3 months     Presenting rhythm BV    A Impedance >300  RV Impedance 513  LV Impedance 342    RV shock 71    P wave sensing -  R wave sensing 7.8    A Threshold - @ -  A Amplitude - @ -  RV Thresholds 0.50 @ 0.40  RV Amplitude 2.0 @ 0.40  LV Thresholds 0.750 @ 0.40  LV Amplitude 1.75 @ 0.40    A Paced -%  V Paced 91.9%    Programmed Mode VVIR     Afib [place] 0%    Episodes :   none    Optivol WNL

## 2022-03-10 ENCOUNTER — TELEPHONE (OUTPATIENT)
Dept: NEPHROLOGY | Age: 72
End: 2022-03-10

## 2022-03-14 ENCOUNTER — TELEPHONE (OUTPATIENT)
Dept: NEPHROLOGY | Age: 72
End: 2022-03-14

## 2022-03-14 DIAGNOSIS — E87.1 HYPONATREMIA: Primary | ICD-10-CM

## 2022-03-14 NOTE — TELEPHONE ENCOUNTER
Left message informing pt that he needs to get sodium checked. Asked for a call back to see where they want the orders faxed to. Lab order pending.

## 2022-03-14 NOTE — TELEPHONE ENCOUNTER
----- Message from Matias Camp MD sent at 3/11/2022  4:45 PM EST -----  These labs are from Dec 2021  Please have him do a sodium level

## 2022-03-30 ENCOUNTER — TELEPHONE (OUTPATIENT)
Dept: NEPHROLOGY | Age: 72
End: 2022-03-30

## 2022-03-30 NOTE — TELEPHONE ENCOUNTER
Left message to remind pt to get sodium checked and asked for a call back to see where he wants the lab order faxed to.

## 2022-04-01 LAB — SODIUM BLD-SCNC: 126 MMOL/L

## 2022-04-04 ENCOUNTER — TELEPHONE (OUTPATIENT)
Dept: NEPHROLOGY | Age: 72
End: 2022-04-04

## 2022-04-04 DIAGNOSIS — E87.1 HYPONATREMIA: Primary | ICD-10-CM

## 2022-04-04 NOTE — RESULT ENCOUNTER NOTE
Sodium is low  Is he still drinking a lot of alcohol? Advise reduction.  This could be dropping his sodium level   Pls increase sodium chloride 2 grams TID  Repeat sodium level in one week

## 2022-04-04 NOTE — TELEPHONE ENCOUNTER
----- Message from Carla Avitia MD sent at 4/4/2022 11:05 AM EDT -----  Sodium is low  Is he still drinking a lot of alcohol? Advise reduction.  This could be dropping his sodium level   Pls increase sodium chloride 2 grams TID  Repeat sodium level in one week Adequate

## 2022-04-05 ENCOUNTER — TELEPHONE (OUTPATIENT)
Dept: CARDIOLOGY CLINIC | Age: 72
End: 2022-04-05

## 2022-04-05 ENCOUNTER — PROCEDURE VISIT (OUTPATIENT)
Dept: CARDIOLOGY CLINIC | Age: 72
End: 2022-04-05

## 2022-04-05 DIAGNOSIS — I50.9 CHF WITH CARDIOMYOPATHY (HCC): Primary | ICD-10-CM

## 2022-04-05 DIAGNOSIS — I42.9 CHF WITH CARDIOMYOPATHY (HCC): Primary | ICD-10-CM

## 2022-04-05 DIAGNOSIS — I50.42 CHF (CONGESTIVE HEART FAILURE), NYHA CLASS II, CHRONIC, COMBINED (HCC): ICD-10-CM

## 2022-04-05 NOTE — TELEPHONE ENCOUNTER
Missed download from device. Due 3/30/21. On battery watch. Called patient. VM sent with interpreters serivces and asked to send download.

## 2022-04-11 ENCOUNTER — TELEPHONE (OUTPATIENT)
Dept: CARDIOLOGY CLINIC | Age: 72
End: 2022-04-11

## 2022-04-11 NOTE — TELEPHONE ENCOUNTER
Pre op Risk Assessment    Procedure Lumbar Epidural  Physician OIO  Date of surgery/procedure TBD    Last OV 11-3-21 with Dr. Dannielle Forrest and 6-15-21 with Dr. Krista Rea 8-5-20  Last Echo 11-3-21  Last Cath 11-3-20 ([facility])  Is patient on blood thinners ASA  Hold Meds/how many days ?     Fax [phone]

## 2022-04-20 ENCOUNTER — TELEPHONE (OUTPATIENT)
Dept: NEPHROLOGY | Age: 72
End: 2022-04-20

## 2022-04-27 LAB — SODIUM BLD-SCNC: 134 MMOL/L

## 2022-05-02 ENCOUNTER — TELEPHONE (OUTPATIENT)
Dept: CARDIOLOGY CLINIC | Age: 72
End: 2022-05-02

## 2022-05-02 DIAGNOSIS — Z45.02 IMPLANTABLE CARDIOVERTER-DEFIBRILLATOR (ICD) GENERATOR END OF LIFE: Primary | ICD-10-CM

## 2022-05-02 NOTE — TELEPHONE ENCOUNTER
Unscheduled [employer] BiV ICD   Pt of [facility]    Pt device triggered WOODY 5/1/22    Call to rosa Rodarte. Updated on WOODY. Okay with Dr Robyn Shaikh doing change out. Ordered placed in Epic and given to Ronnie Cronin for scheduling. Lesia Cronin will be calling with scheduling date/time/instructions.

## 2022-05-12 NOTE — TELEPHONE ENCOUNTER
Procedure: battery change- ICD  Date: 05.16.2022  Arrival Time: 8am  Meds to Hold:none     Incision/ device check- at [facility]  [phone] at 2pm    Instructions verbalized to the patient And son Jacquie Oppenheim

## 2022-05-13 ENCOUNTER — PREP FOR PROCEDURE (OUTPATIENT)
Dept: CARDIOLOGY | Age: 72
End: 2022-05-13

## 2022-05-13 RX ORDER — SODIUM CHLORIDE 9 MG/ML
INJECTION, SOLUTION INTRAVENOUS CONTINUOUS
Status: CANCELLED | OUTPATIENT
Start: 2022-05-13

## 2022-05-13 RX ORDER — SODIUM CHLORIDE 0.9 % (FLUSH) 0.9 %
5-40 SYRINGE (ML) INJECTION PRN
Status: CANCELLED | OUTPATIENT
Start: 2022-05-13

## 2022-05-13 RX ORDER — SODIUM CHLORIDE 9 MG/ML
INJECTION, SOLUTION INTRAVENOUS PRN
Status: CANCELLED | OUTPATIENT
Start: 2022-05-13

## 2022-05-13 RX ORDER — SODIUM CHLORIDE 0.9 % (FLUSH) 0.9 %
5-40 SYRINGE (ML) INJECTION EVERY 12 HOURS SCHEDULED
Status: CANCELLED | OUTPATIENT
Start: 2022-05-13

## 2022-05-16 ENCOUNTER — HOSPITAL ENCOUNTER (OUTPATIENT)
Dept: INPATIENT UNIT | Age: 72
Discharge: HOME | End: 2022-05-16
Attending: INTERNAL MEDICINE | Admitting: INTERNAL MEDICINE
Payer: MEDICARE

## 2022-05-16 VITALS
RESPIRATION RATE: 15 BRPM | HEART RATE: 70 BPM | SYSTOLIC BLOOD PRESSURE: 141 MMHG | BODY MASS INDEX: 21.98 KG/M2 | TEMPERATURE: 97.7 F | DIASTOLIC BLOOD PRESSURE: 71 MMHG | HEIGHT: 68 IN | WEIGHT: 145 LBS | OXYGEN SATURATION: 99 %

## 2022-05-16 LAB
ANION GAP SERPL CALCULATED.3IONS-SCNC: 17 MEQ/L (ref 8–16)
APTT: 31 SECONDS (ref 22–38)
BUN BLDV-MCNC: 13 MG/DL (ref 7–22)
CALCIUM SERPL-MCNC: 9.4 MG/DL (ref 8.5–10.5)
CHLORIDE BLD-SCNC: 95 MEQ/L (ref 98–111)
CO2: 17 MEQ/L (ref 23–33)
CREAT SERPL-MCNC: 1.1 MG/DL (ref 0.4–1.2)
ERYTHROCYTE [DISTWIDTH] IN BLOOD BY AUTOMATED COUNT: 14 % (ref 11.5–14.5)
ERYTHROCYTE [DISTWIDTH] IN BLOOD BY AUTOMATED COUNT: 51.6 FL (ref 35–45)
GFR SERPL CREATININE-BSD FRML MDRD: 66 ML/MIN/1.73M2
GLUCOSE BLD-MCNC: 71 MG/DL (ref 70–108)
HCT VFR BLD CALC: 43.4 % (ref 42–52)
HEMOGLOBIN: 15 GM/DL (ref 14–18)
INR BLD: 1 (ref 0.85–1.13)
MCH RBC QN AUTO: 34.7 PG (ref 26–33)
MCHC RBC AUTO-ENTMCNC: 34.6 GM/DL (ref 32.2–35.5)
MCV RBC AUTO: 100.5 FL (ref 80–94)
PLATELET # BLD: 184 THOU/MM3 (ref 130–400)
PMV BLD AUTO: 10 FL (ref 9.4–12.4)
POTASSIUM REFLEX MAGNESIUM: 4.3 MEQ/L (ref 3.5–5.2)
RBC # BLD: 4.32 MILL/MM3 (ref 4.7–6.1)
SODIUM BLD-SCNC: 129 MEQ/L (ref 135–145)
WBC # BLD: 7.5 THOU/MM3 (ref 4.8–10.8)

## 2022-05-16 PROCEDURE — 33264 RMVL & RPLCMT DFB GEN MLT LD: CPT | Performed by: INTERNAL MEDICINE

## 2022-05-16 PROCEDURE — 6360000002 HC RX W HCPCS: Performed by: STUDENT IN AN ORGANIZED HEALTH CARE EDUCATION/TRAINING PROGRAM

## 2022-05-16 PROCEDURE — 33223 RELOCATE POCKET FOR DEFIB: CPT | Performed by: INTERNAL MEDICINE

## 2022-05-16 PROCEDURE — 85610 PROTHROMBIN TIME: CPT

## 2022-05-16 PROCEDURE — 2500000003 HC RX 250 WO HCPCS

## 2022-05-16 PROCEDURE — 85730 THROMBOPLASTIN TIME PARTIAL: CPT

## 2022-05-16 PROCEDURE — 6360000002 HC RX W HCPCS

## 2022-05-16 PROCEDURE — 33264 RMVL & RPLCMT DFB GEN MLT LD: CPT

## 2022-05-16 PROCEDURE — 80048 BASIC METABOLIC PNL TOTAL CA: CPT

## 2022-05-16 PROCEDURE — 6370000000 HC RX 637 (ALT 250 FOR IP): Performed by: INTERNAL MEDICINE

## 2022-05-16 PROCEDURE — 85027 COMPLETE CBC AUTOMATED: CPT

## 2022-05-16 PROCEDURE — 33223 RELOCATE POCKET FOR DEFIB: CPT

## 2022-05-16 PROCEDURE — 2580000003 HC RX 258: Performed by: STUDENT IN AN ORGANIZED HEALTH CARE EDUCATION/TRAINING PROGRAM

## 2022-05-16 PROCEDURE — A4217 STERILE WATER/SALINE, 500 ML: HCPCS | Performed by: STUDENT IN AN ORGANIZED HEALTH CARE EDUCATION/TRAINING PROGRAM

## 2022-05-16 PROCEDURE — 36415 COLL VENOUS BLD VENIPUNCTURE: CPT

## 2022-05-16 PROCEDURE — C1882 AICD, OTHER THAN SING/DUAL: HCPCS

## 2022-05-16 RX ORDER — SODIUM CHLORIDE 0.9 % (FLUSH) 0.9 %
5-40 SYRINGE (ML) INJECTION PRN
Status: DISCONTINUED | OUTPATIENT
Start: 2022-05-16 | End: 2022-05-16 | Stop reason: HOSPADM

## 2022-05-16 RX ORDER — SODIUM CHLORIDE 9 MG/ML
INJECTION, SOLUTION INTRAVENOUS PRN
Status: DISCONTINUED | OUTPATIENT
Start: 2022-05-16 | End: 2022-05-16 | Stop reason: HOSPADM

## 2022-05-16 RX ORDER — ONDANSETRON 2 MG/ML
4 INJECTION INTRAMUSCULAR; INTRAVENOUS EVERY 6 HOURS PRN
Status: DISCONTINUED | OUTPATIENT
Start: 2022-05-16 | End: 2022-05-16 | Stop reason: HOSPADM

## 2022-05-16 RX ORDER — ACETAMINOPHEN 325 MG/1
650 TABLET ORAL EVERY 4 HOURS PRN
Status: DISCONTINUED | OUTPATIENT
Start: 2022-05-16 | End: 2022-05-16 | Stop reason: HOSPADM

## 2022-05-16 RX ORDER — CEPHALEXIN 500 MG/1
500 CAPSULE ORAL 3 TIMES DAILY
Qty: 15 CAPSULE | Refills: 0 | Status: SHIPPED | OUTPATIENT
Start: 2022-05-16 | End: 2022-05-21

## 2022-05-16 RX ORDER — SODIUM CHLORIDE 0.9 % (FLUSH) 0.9 %
5-40 SYRINGE (ML) INJECTION EVERY 12 HOURS SCHEDULED
Status: DISCONTINUED | OUTPATIENT
Start: 2022-05-16 | End: 2022-05-16 | Stop reason: HOSPADM

## 2022-05-16 RX ORDER — SODIUM CHLORIDE 9 MG/ML
INJECTION, SOLUTION INTRAVENOUS CONTINUOUS
Status: DISCONTINUED | OUTPATIENT
Start: 2022-05-16 | End: 2022-05-16 | Stop reason: HOSPADM

## 2022-05-16 RX ORDER — CEPHALEXIN 500 MG/1
500 CAPSULE ORAL 3 TIMES DAILY
Qty: 15 CAPSULE | Refills: 0 | Status: SHIPPED | OUTPATIENT
Start: 2022-05-16 | End: 2022-05-16 | Stop reason: HOSPADM

## 2022-05-16 RX ADMIN — Medication 2000 MG: at 09:55

## 2022-05-16 RX ADMIN — VANCOMYCIN HYDROCHLORIDE 500 MG: 500 INJECTION, POWDER, LYOPHILIZED, FOR SOLUTION INTRAVENOUS at 11:59

## 2022-05-16 RX ADMIN — SODIUM CHLORIDE: 9 INJECTION, SOLUTION INTRAVENOUS at 08:39

## 2022-05-16 RX ADMIN — ACETAMINOPHEN 650 MG: 325 TABLET ORAL at 14:34

## 2022-05-16 ASSESSMENT — PAIN SCALES - GENERAL: PAINLEVEL_OUTOF10: 3

## 2022-05-16 ASSESSMENT — PAIN DESCRIPTION - LOCATION: LOCATION: CHEST

## 2022-05-16 ASSESSMENT — PAIN DESCRIPTION - ORIENTATION: ORIENTATION: LEFT

## 2022-05-16 NOTE — BRIEF OP NOTE
Brief Postoperative Note    Date:   5/16/2022  Patient name: Carmen Rodney  YOB: 1950  Sex: male   MRN:   [id number]    PCP: Daniel Herrera MD     Procedure: BiV ICD generator change and pocket revision. Pre-Op Diagnosis: BiV ICE generator at RRT and pocket site pain    Post-Op Diagnosis: Same    Surgeon: Juliana Monsivais MD, MRCP, [facility], [address]    Assistant: Angelica Corado. Anesthesia/sedation:  Local lidocaine/fentanyl and midazolam as needed. Estimated Blood Loss (mL): Minimal    Complications: None    Recommendations:   See Epic orders.  Bed rest for 2 hours.  Keep pocket site dry.  No shower for 7 days ((sponge bath and tub bath OK).  ICE packs locally.  Monitor site for bleeding or swelling.  Pressure dressing x 24 hours.  Follow up in device clinic in 1 week.        Electronically signed by Natali Fajardo MD, Marilou Clark on 5/16/2022 at 12:28 PM

## 2022-05-16 NOTE — H&P
"ED Provider Note  CHIEF COMPLAINT  Low back pain, suprapubic pain, pain with urination    [facility]  Joycelyn Byers is a 44 y.o. female who presents to the ER complaining of bilateral lower rib pain for the last 2 weeks which she thought was related to her ankylosing spondylitis.  She says she was walking 2 miles a day to help with her bilateral rib pain and it was helping.  4 to 5 days ago she developed pain in her low back and sacrum which, again, she thought was related to ankylosing spondylitis.  She has been off Enbrel for 1 week having taken her last dose 2 weeks ago.  She thought being off Enbrel was causing a flareup of her ankylosing spondylitis.  However, she started having suprapubic pain and \"bladder spasm pain\" that occurred with and without urination as well as an acidic smelling and cloudy urine, she started getting concerned that she might have UTI or ureteral stone.  She has had history of UTI and ureteral stone multiple times in the past.  No fevers but she got very sweaty, clammy and nauseated yesterday at work.  She had to leave work early.  No obvious blood in her urine.  She says she is had some slight vaginal discharge which again, smells acidic.  She denies possibility of STDs.  She has had a hysterectomy and a right oophorectomy.  She is had nausea but no vomiting.  She has had loose stools over the last 2 weeks, but says that is typical with her ankylosing spondylitis flareups.  Patient reports a dry cough lately related to her \"asthma.\"  She denies runny nose, sore throat or signs symptoms of COVID-19.    REVIEW OF SYSTEMS  See HPI for further details.  Positive for bilateral flank pain, bilateral low back pain, suprapubic pain, bladder spasms, cloudy and dark urine, foul-smelling urine, nausea, sweating, loose stools.  Negative for fevers, chills, runny nose, sore throat, rash, hematuria.  All other systems are negative.    PAST MEDICAL HISTORY  Past Medical History:   Diagnosis Date " [address] [facility] ()  [id number] [place] [id number]  H&P and SEDATION/ANALGESIA     Patient demographics:  Date:   5/16/2022  Patient name: Leonel Azevedo  YOB: 1950  Sex: male   MRN:   [id number]    Reason for admission or planned procedure: BiV ICD generator change and pocket revision. Code Status: Full Code     Consent:I have discussed with the patient and/or the patient representative the indication, alternatives, and the possible risks and/or complications of the planned procedure and the anesthesia methods. The patient and/or patient representative appear to understand and agree to proceed. Brief clinical summary:  71/M BiV ICD initially implanted for ICM and AV block in 2006 (unipolar LV and Fedalis ICD) followed by RV lead extraction and reimplantation in 2011 and generator change in 10/2014 electively presents for generator change (RRT since 5/1/1022) and pocket revision ([pain and prominent PG). Medical Hx: ICM-resolved (LVEf 55%), AV block, CAD/PCI-LAD and RCA (2017), severe MR s/p Chrystal Clip (11/2020), DM, HPL, obesity, SHAINA/CPAP and bilateral sensorineural hearing loss.      Past Medical History:  Past Medical History:   Diagnosis Date    CHARISSA (acute kidney injury) ([address]     Arthritis     general    Blood circulation, collateral     CAD (coronary artery disease)     Cardiomyopathy ([address] 8/13/2014    CHF with cardiomyopathy ([address] 2/4/2019    Chronic atrial fibrillation ([address] 8/13/2014    Chronic kidney disease     Congenital heart disease     Deafness congenital 8/13/2014    ETOH abuse 8/13/2014    ETOH abuse     GERD (gastroesophageal reflux disease)     Hyperlipidemia     Hypertension     Medical non-compliance 8/13/2014    Medtronic BiV ICD implanted 9/18/14 [facility] 10/8/2014    Medtronic BiV ICD-no atrial lead 10/8/2014    Severe malnutrition ([address]        Past Surgical History:  Past Surgical History:   Procedure   • Allergy, unspecified not elsewhere classified    • Ankylosing spondylitis of lumbosacral region ([facility]) 7/6/2015   • Anxiety 11/24/2015   • GERD (gastroesophageal reflux disease)    • Headache, classical migraine    • Hiatal hernia    • Intractable migraine without aura and without status migrainosus 11/24/2015   • Lupus ([facility])    • Morphea 1/9/2019   • Oropharyngeal dysphagia    • Other forms of systemic lupus erythematosus ([facility]) 10/20/2017   • Overactive bladder    • Peptic ulcer    • Polyuria 7/17/2019   • Prediabetes 12/8/2016   • Vocal cord dysfunction        FAMILY HISTORY  Family History   Problem Relation Age of Onset   • Arthritis Brother         psoriatic arthritis   • Arthritis Maternal Grandmother    • Psychiatric Illness Mother         Major depression   • Other Mother         discoid lupus   • Psychiatric Illness Father         Bipolar depression   • Heart Disease Father         CHF   • Hypertension Father    • Diabetes Father    • Other Father         Agent orange exposure 125% disabled   • Diabetes Paternal Uncle    • Stroke Maternal Grandfather    • Diabetes Paternal Grandmother    • Alcohol/Drug Paternal Grandfather    • Cancer Paternal Aunt         gyn cancer   • GI Disease Son    • GI Disease Daughter    • GI Disease Daughter    • GI Disease Daughter        SOCIAL HISTORY  Social History     Socioeconomic History   • Marital status:      Spouse name: Not on file   • Number of children: Not on file   • Years of education: Not on file   • Highest education level: Not on file   Occupational History   • Not on file   Social Needs   • Financial resource strain: Not on file   • Food insecurity     Worry: Not on file     Inability: Not on file   • Transportation needs     Medical: Not on file     Non-medical: Not on file   Tobacco Use   • Smoking status: Never Smoker   • Smokeless tobacco: Never Used   Substance and Sexual Activity   • Alcohol use: No     Alcohol/week: 0.0 oz   • Drug use: No  Laterality Date    CARDIAC DEFIBRILLATOR PLACEMENT  2005    PACEMAKER INSERTION  2005    GA FOOT/TOES SURGERY PROC UNLISTED Right 11/16/2018    RIGHT FOOT 1ST MTP JOINT ARTHRODESIS performed by Emili Cabezas MD at [address] [place] [address]        Allergies: Allergies as of 05/16/2022    (No Known Allergies)     Additional information:       Medications     Current Facility-Administered Medications:     0.9 % sodium chloride infusion, , IntraVENous, Continuous, Allison Powell PA-C, Last Rate: 100 mL/hr at 05/16/22 0839, New Bag at 05/16/22 0839    0.9 % sodium chloride infusion, , IntraVENous, PRN, Allison Powell PA-C    sodium chloride flush 0.9 % injection 5-40 mL, 5-40 mL, IntraVENous, 2 times per day, Allison Powell PA-C    sodium chloride flush 0.9 % injection 5-40 mL, 5-40 mL, IntraVENous, PRN, Allison Powell PA-C    vancomycin (VANCOCIN) 500 mg in sodium chloride 0.9 % 500 mL irrigation, 500 mg, Irrigation, Once, Allison Powell PA-C  Prior to Admission medications    Medication Sig Start Date End Date Taking?  Authorizing Provider   sodium chloride 1 g tablet Take 2 tablets by mouth 2 times daily  Patient taking differently: Take 2 g by mouth 3 times daily  11/12/21   Odell Tejeda MD   irbesartan (AVAPRO) 300 MG tablet TAKE 1 TABLET EVERY NIGHT 6/15/21   Jose Ramon Velasco MD   bumetanide (BUMEX) 2 MG tablet Take 1 tablet by mouth daily 1/28/21   Katharine Kirkland MD   potassium chloride (KLOR-CON M) 10 MEQ extended release tablet Take 1 tablet by mouth daily 11/11/20   Katharine Kirkland MD   bethanechol (URECHOLINE) 25 MG tablet Take 1 tablet by mouth 3 times daily  Patient taking differently: Take 25 mg by mouth 2 times daily Takes 1.5 bid 11/10/20   Reba Goodell, PA-C   vitamin B-1 (THIAMINE) 100 MG tablet Take 100 mg by mouth daily    Historical Provider, MD   folic acid (FOLVITE) 1 MG tablet Take 1 mg by mouth daily    Historical Provider, MD   Multiple "  • Sexual activity: Yes     Partners: Male     Birth control/protection: Surgical     Comment: ., 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, studying for PhD in psych nursing   Lifestyle   • Physical activity     Days per week: Not on file     Minutes per session: Not on file   • Stress: Not on file   Relationships   • Social connections     Talks on phone: Not on file     Gets together: Not on file     Attends Worship service: Not on file     Active member of club or organization: Not on file     Attends meetings of clubs or organizations: Not on file     Relationship status: Not on file   • Intimate partner violence     Fear of current or ex partner: Not on file     Emotionally abused: Not on file     Physically abused: Not on file     Forced sexual activity: Not on file   Other Topics Concern   • Not on file   Social History Narrative    Patient is currently disabled, but is in school full time for nursing. She is  with four children.        SURGICAL HISTORY  Past Surgical History:   Procedure Laterality Date   • HYSTERECTOMY LAPAROSCOPY  2013    utrine and right ovary removed   • HERNIA REPAIR  2007    umblical hernia   • CHOLECYSTECTOMY  2007   • SALPINGO-OOPHORECTOMY, UNILATERAL Right        CURRENT MEDICATIONS  Home Medications    **Home medications have not yet been reviewed for this encounter**         ALLERGIES  Allergies   Allergen Reactions   • Savella [Kdc:Milnacipran+Ci Pigment Blue 63] Myalgia   • Ciprofloxacin    • Reglan [Kdc:Yellow Dye+Ci Pigment Blue 63+Metoclopramide]    • Sulfa Drugs Nausea     GI upset   • Tetanus Antitoxin      Mild spasms and pain     • Tramadol Rash and Shortness of Breath       PHYSICAL EXAM  VITAL SIGNS: /85   Pulse 86   Temp 36.8 °C (98.3 °F) (Temporal)   Resp 16   Ht 1.6 m (5' 3\")   Wt 75.3 kg (166 lb 0.1 oz)   LMP 03/29/2016   SpO2 98%   BMI 29.41 kg/m²      Constitutional: Well developed, well nourished; No acute distress; Non-toxic " Vitamins-Minerals (THERAPEUTIC MULTIVITAMIN-MINERALS) tablet Take 1 tablet by mouth daily    Historical Provider, MD   tamsulosin (FLOMAX) 0.4 MG capsule Take 1 capsule by mouth daily 6/24/19   SUSAN Ospina - CNP   cetirizine (ZYRTEC) 10 MG tablet Take 10 mg by mouth daily    Historical Provider, MD   atorvastatin (LIPITOR) 80 MG tablet Take 80 mg by mouth nightly    Historical Provider, MD   gabapentin (NEURONTIN) 100 MG capsule Take 1 capsule by mouth 3 times daily for 360 days. 3/28/19 3/22/21  Jose Ramon Zurita MD   nitroGLYCERIN (NITROSTAT) 0.4 MG SL tablet PLACE 1 TABLET UNDER THE TONGUE EVERY 5 MINUTES AS NEEDED FOR CHEST PAIN UP TO MAX OF 3 TOTAL DOSES. IF NO RELIEF AFTER 1 DOSE, CALL 911. 12/24/18   Seleta Goldberg Buettner, PA-C   aspirin 81 MG EC tablet Take 81 mg by mouth daily. Historical Provider, MD     Aspirin  [x] 81 mg  [] 325 mg  [] None  Antiplatelet drug therapy use last 5 days  []No [x]Yes  Coumadin Use Last 5 Days [x]No []Yes  Other anticoagulant use last 5 days  [x]No []Yes  Additional information: Per medical records       Vitals:   Vitals:    05/16/22 0845   BP: 117/70   Pulse: 70   Resp: 14   Temp: 97.7 °F (36.5 °C)   SpO2: 97%       Physical Examination:   GENERAL: Alert and oriented. No distress. EYES: No pallor or icterus. ENT: No cyanosis. Deaf. VESSELS: No jugular venous distension or carotid bruits. HEART: Normal S1/S2. No murmur, rub or gallop. LUNGS: Clear to auscultation. CHEST WALL: Prominent BiV PG and suture sleeves without erosions, discharge or swelling at device site. ABDOMEN: Soft and non-tender. EXTREMITIES: No lower extremity edema. Feet are warm. NEUROLOGICAL: Grossly intact. Procedure Plannd:   [] AF ablation [] Atrial Flutter ablation [] SVT ablation [] PVC/VT ablation [] EP study  [] Pacemaker implantation [] AICD implantation [] BiV PM/ICD implantation   [x] Generator change [] Loop recorder implantation [] Loop recorder explantation.    [] appearance.   HENT: Normocephalic, atraumatic; Bilateral external ears normal; oral pharyngeal examination deferred due to [address] 19 outbreak and lack of oral pharyngeal complaints.  Eyes: PERRL, EOMI, Conjunctiva normal. No discharge.   Neck:  Supple, nontender midline; No stridor; No nuchal rigidity.   Lymphatic: No cervical lymphadenopathy noted.   Cardiovascular: Regular rate and rhythm without murmurs, rubs, or gallop.   Thorax & Lungs: No respiratory distress, breath sounds clear to auscultation bilaterally without wheezing, rales or rhonchi. Nontender chest wall. No crepitus or subcutaneous air  Abdomen: Soft, tender to percussion and palpation in the suprapubic area and the midepigastrium.  Patient reports she always has pain in the midepigastrium due to her GERD.  Bowel sounds normal. No obvious masses; No pulsatile masses; no rebound, guarding, or peritoneal signs.   Skin: Good color; warm and dry without rash or petechia.  Back: Nontender, right CVA tenderness.   Extremities: Distal radial, dorsalis pedis, posterior tibial pulses are equal bilaterally; No edema; Nontender calves or saphenous, No cyanosis, No clubbing.   Musculoskeletal: Good range of motion in all major joints. No tenderness to palpation or major deformities noted.   Neurologic: Alert & oriented x 4, clear speech      RADIOLOGY/PROCEDURES  CT-RENAL COLIC EVALUATION(A/P W/O)   Final Result         1. No urinary tract calculus identified. No renal collecting system in dilatation.      2. No evidence of inflammatory change in the abdomen or pelvis.  The study is however limited due to nonuse of intravenous contrast          COURSE & MEDICAL DECISION MAKING  Pertinent Labs & Imaging studies reviewed. (See chart for details)  Results for orders placed or performed during the hospital encounter of 06/26/20   CBC WITH DIFFERENTIAL   Result Value Ref Range    WBC 8.9 4.8 - 10.8 K/uL    RBC 5.21 4.20 - 5.40 M/uL    Hemoglobin 13.6 12.0 - 16.0 g/dL     Hematocrit 41.8 37.0 - 47.0 %    MCV 80.2 (L) 81.4 - 97.8 fL    MCH 26.1 (L) 27.0 - 33.0 pg    MCHC 32.5 (L) 33.6 - 35.0 g/dL    RDW 39.4 35.9 - 50.0 fL    Platelet Count 346 164 - 446 K/uL    MPV 10.0 9.0 - 12.9 fL    Neutrophils-Polys 69.50 44.00 - 72.00 %    Lymphocytes 20.80 (L) 22.00 - 41.00 %    Monocytes 7.30 0.00 - 13.40 %    Eosinophils 1.10 0.00 - 6.90 %    Basophils 0.90 0.00 - 1.80 %    Immature Granulocytes 0.40 0.00 - 0.90 %    Nucleated RBC 0.00 /100 WBC    Neutrophils (Absolute) 6.17 2.00 - 7.15 K/uL    Lymphs (Absolute) 1.85 1.00 - 4.80 K/uL    Monos (Absolute) 0.65 0.00 - 0.85 K/uL    Eos (Absolute) 0.10 0.00 - 0.51 K/uL    Baso (Absolute) 0.08 0.00 - 0.12 K/uL    Immature Granulocytes (abs) 0.04 0.00 - 0.11 K/uL    NRBC (Absolute) 0.00 K/uL   COMP METABOLIC PANEL   Result Value Ref Range    Sodium 138 135 - 145 mmol/L    Potassium 3.9 3.6 - 5.5 mmol/L    Chloride 104 96 - 112 mmol/L    Co2 24 20 - 33 mmol/L    Anion Gap 10.0 7.0 - 16.0    Glucose 99 65 - 99 mg/dL    Bun 20 8 - 22 mg/dL    Creatinine 0.62 0.50 - 1.40 mg/dL    Calcium 9.0 8.4 - 10.2 mg/dL    AST(SGOT) 17 12 - 45 U/L    ALT(SGPT) 13 2 - 50 U/L    Alkaline Phosphatase 65 30 - 99 U/L    Total Bilirubin 0.3 0.1 - 1.5 mg/dL    Albumin 3.8 3.2 - 4.9 g/dL    Total Protein 7.5 6.0 - 8.2 g/dL    Globulin 3.7 (H) 1.9 - 3.5 g/dL    A-G Ratio 1.0 g/dL   LIPASE   Result Value Ref Range    Lipase 19 7 - 58 U/L   URINALYSIS,CULTURE IF INDICATED    Specimen: Blood   Result Value Ref Range    Color Yellow     Character Clear     Specific Gravity 1.020 <1.035    Ph 7.0 5.0 - 8.0    Glucose Negative Negative mg/dL    Ketones Negative Negative mg/dL    Protein Negative Negative mg/dL    Bilirubin Negative Negative    Nitrite Negative Negative    Leukocyte Esterase Negative Negative    Occult Blood Negative Negative    Micro Urine Req see below    HCG QUAL SERUM   Result Value Ref Range    Beta-Hcg Qualitative Serum Negative Negative   ESTIMATED GFR    Result Value Ref Range    GFR If African American >60 >60 mL/min/1.73 m 2    GFR If Non African American >60 >60 mL/min/1.73 m 2   WET PREP    Specimen: Vaginal; Genital   Result Value Ref Range    Significant Indicator NEG     Source GEN     Site VAGINAL     Wet Prep For Parasites       Few WBCs seen.  No yeast.  No motile Trichomonas seen.  No clue cells seen.     Chlamydia/GC PCR Urine Or Swab    Specimen: Genital   Result Value Ref Range    Source Vaginal        Patient presents to the ER complaining of bilateral lower lumbar and sacral pain as well as complaint of suprapubic pain, bladder spasming, and a foul-smelling urine for the last 4 to 5 days.  Patient has a history of ureteral stones.  She has a history of UTIs.  She says her symptoms remind her of kidney stone as well as UTI pain.  She also has history of ankylosing spondylitis.  She has been off her Enbrel now for 2 weeks.  She says the Enbrel is no longer working for her and she decided to stop taking it.  She has not had fevers or chills.  The bladder spasm pain gets worse when she urinates.  It then will taper off for about 30 minutes after she urinates but it lingers and will never go away.  When she urinates again the bladder spasms get worse again.  No hematuria.  She denies increased frequency and urgency of urination but states that despite drinking a lot of fluids, she does not feel she is urinating as much as she should.  She has some tenderness in the suprapubic region.  Urine is negative.  CT scan is negative.  No evidence for obvious UTI or ureteral stone.  She has been told in the past that some of her symptoms could be related to interstitial cystitis.  She has been told to follow-up with urology but she has yet to do so.  It is possible some of her suprapubic/bladder spasms and low back discomfort could be related to interstitial cystitis.  Her uterus and right ovary of already been removed.  No chance of pregnancy.  CT scan is  negative for colitis, diverticulitis, obstruction, perforation or any other dangerous intra-abdominal pathology.  Her lab work is unremarkable.  She described a foul-smelling vaginal discharge as well.  Pelvic exam was performed.  There is small amount of discharge in the vault.  Cervix is surgically absent.  Wet mount is negative.  Patient is well-appearing overall.  Despite a normal urine, we will culture it as patient is symptomatic and has history of UTI.  I will go ahead and cover her with antibiotics in the event that her culture comes back positive.  I also sent her out on Pyridium as that may help with the bladder spasms.  Patient's vitals are normal and stable.  She is not in any distress.  The patient is comfortable with the plan.  She has been told to return to the ER in 18 to 24 hours if she is not feeling better.  Otherwise she must follow-up with her primary care physician within the next 1 to 2 days.  Also suggested she let her rheumatologist know that she was here in the ER today with some recurrent urinary tract type symptoms.  At this time she is not septic or toxic.  Again, no fever.  She has not been on Enbrel for several weeks now.  She is not septic or toxic.  The patient has been given strict return precautions and discharge instructions.  She understands she gets worse in any way she must return to the ER immediately.  Otherwise I given her referral information for urology as I think she would benefit from follow-up with a urologist.  Patient understands treatment plan and follow-up.    I verified that the patient was wearing a mask and I was wearing appropriate PPE every time I entered the room. The patient's mask was on the patient at all times during my encounter.    FINAL IMPRESSION  1. Dysuria Acute    2. Flank pain Acute           This dictation has been created using voice recognition software. The accuracy of the dictation is limited by the abilities of the software. I expect there  may be some errors of grammar and possibly content. I made every attempt to manually correct the errors within my dictation. However, errors related to voice recognition software may still exist and should be interpreted within the appropriate context.  Electronically signed by: Kimberley Cabrera M.D., 6/26/2020 7:23 AM

## 2022-05-16 NOTE — PROCEDURES
[facility]  Dept: [phone]    CARDIAC ELECTROPHYSIOLOGY: PROCEDURE NOTE  PATIENT DEMOGRAPHICS:  Date:   5/16/2022  Patient name:              Kiki Stone  YOB: 1950  Sex: male   MRN:   [id number]    PRIMARY CARE PROVIDER:    Stuart Trivedi MD     CARDIOLOGIST:  Javid Sequeira MD    PROCEDURE PLANNED:  · BiV ICD generator change. · Pocket revision. INDICATION FOR PROCEDURE:  · BiV ICD pulse generator at RRT. · BiV ICD pocket site pain. BRIEF CLINICAL HISTORY:  71/M BiV ICD initially implanted for ICM and AV block in 2006 (unipolar LV and Fedalis ICD) followed by RV lead extraction and reimplantation in 2011 and generator change in 10/2014 electively presents for generator change (RRT since 5/1/1022) and pocket revision ([pain and prominent PG). Medical Hx: ICM-resolved (LVEf 55%), AV block, CAD/PCI-LAD and RCA (2017), severe MR s/p Chrystal Clip (11/2020), DM, HPL, obesity, SHAINA/CPAP and bilateral sensorineural hearing loss. PROCEDURE PERFORMED:  · BiV ICD generator change,  · BiV ICD pocket revision. · Device interrogation. DESCRIPTION OF THE PROCEDURE:  The patient was brought to the cardiac catheterization lab in a fasting and non-sedated. The device was interrogated and programmed as VOO 50 PPM. The patient was prepped and draped in the usual sterile fashion. Conscious sedation was administered. The pulse generator and suture sleeves were prominent with mild thinning of the overlying skin without erosions or discharge. Lidocaine, 2% was injected into the skin and subcutaneous tissue over the pulse generator. A 4 cm incision was made into the skin and subcutaneous tissue which exposed the pulse generator (superficial). Electrocautery was used to break the adhesions and pulse generator was taken out of the pocket by gentle traction.  The leads were carefully dissected from the scar tissue and adhesions. The muscle fibers of the pectoralis major muscle were cut and retracted using electric cautery and a submuscular pocket created. The pocket was thoroughly irrigated with antibiotic solution. The leads were disconnected from the old pulse generator and tested with external analysing system. The lead were then connected to a new pulse generator. The atrial and SVC ports ere plugged. The leads were wrapped around the pulse generator and placed inside the new submuscular pocket. The muscle edges were approximated with 2-0 Vicryl suture. The pocket was closed in 3 layers. Steri-Strips were applied to the incision and surgical site covered by a sterile pressure dressing. The patient's hemodynamics including blood pressure, heart rate and oxygen saturation were monitored throughout the procedure. He tolerated the procedure well. MEDICATIONS:  · Midazolam 4 mg intravenously. · Fentanyl 175 mcg intravenously. · Cefazolin 2 gm intravenously for surgical prophylaxis. · Lidocaine/Marcaine 30 cc for local anaesthesia. FLUOROSCOPIC TIME:  Zero minutes. BLOOD LOSS:  Minimal.     COMPLICATIONS:  None. DEVICE  · Explanted pulse generator (9/18/2014): Medtronic, model [id number] and serial [id number]. · New pulse generator: Medtronic, model [id number] and [id number]. · Right ventricular lead (9/18/2014): Medtronic, model [id number] and serial [id number]. · Coronary sinus lead (9/18/2014): Medtronic (unipolar), model [id number] and serial [id number]. · Atrial and SVC ports plugged. INTRAOPERATIVE ELECTRIC PARAMETERS:  · RV lead: [email or web address]. · Coronary sinus lead: [email or web address]. BRADYCARDIA SETTING:  DDDR  beats. TACHYCARDIA SETTINGS:  VT monitor (150 BPM) and VF >214 BPM.     SUMMARY:  · Successful BiV ICD pulse generator change. · Successful pocket revsion (relocation of pulse hgenerator to submuscular location). · Normal device function. RECOMMENDATIONS:    1. Bed rest x 2 hours. 2. Keflex and 500 mg every 12 hours x 5 days. 3. Post operative care per protocol.       Electronically signed by Sweta Collazo MD, Luca Sandoval on 5/16/2022 at 12:33 PM

## 2022-05-16 NOTE — PROGRESS NOTES
Discharge teaching and instructions completed with patient and son using teachback method. Patient is deaf but was able to read along. AVS reviewed. Prescription escribed to patient's pharmacy. Patient voiced understanding regarding prescriptions, follow up appointments, and care of self at home.     1500 Discharged in a wheelchair to home with support per family

## 2022-05-25 ENCOUNTER — NURSE ONLY (OUTPATIENT)
Dept: CARDIOLOGY CLINIC | Age: 72
End: 2022-05-25
Payer: MEDICARE

## 2022-05-25 DIAGNOSIS — Z95.810 BIVENTRICULAR IMPLANTABLE CARDIOVERTER-DEFIBRILLATOR IN SITU: Primary | ICD-10-CM

## 2022-05-25 PROCEDURE — 93289 INTERROG DEVICE EVAL HEART: CPT | Performed by: INTERNAL MEDICINE

## 2022-05-25 PROCEDURE — 93290 INTERROG DEV EVAL ICPMS IP: CPT | Performed by: INTERNAL MEDICINE

## 2022-05-25 RX ORDER — CEPHALEXIN 500 MG/1
500 CAPSULE ORAL 2 TIMES DAILY
Qty: 14 CAPSULE | Refills: 0 | Status: SHIPPED | OUTPATIENT
Start: 2022-05-25 | End: 2022-06-01

## 2022-05-25 NOTE — PROGRESS NOTES
medtronic biv icd post change out     cuate in to see incision. Wet underneath steri-strips, steri strips replaced by Cuate, keflex 500 bid x7days     . Tony Ra Battery longevity:  5.4 years on device   Presenting rhythm  biv paced     RV impedance 399    Shock 39  R wave sensing 6.5    100 % RV and LV  paced     RV threshold . 5 V at 0.4ms  LV threshold 0.75 @ 1    Will call medtronic for new monitor

## 2022-06-08 ENCOUNTER — TELEPHONE (OUTPATIENT)
Dept: NEPHROLOGY | Age: 72
End: 2022-06-08

## 2022-06-08 NOTE — TELEPHONE ENCOUNTER
Patients daughter states her brother is going to be bringing patient for follow up appointment on 6.14.22. In case her brother forgets to ask, they would like to know if patient should be on Flomax?

## 2022-06-10 LAB
BUN BLDV-MCNC: 8 MG/DL
CALCIUM SERPL-MCNC: 9.5 MG/DL
CHLORIDE BLD-SCNC: 93 MMOL/L
CO2: 17 MMOL/L
CREAT SERPL-MCNC: 0.9 MG/DL
GFR CALCULATED: 85
GLUCOSE BLD-MCNC: 77 MG/DL
POTASSIUM SERPL-SCNC: 4.9 MMOL/L
SODIUM BLD-SCNC: 129 MMOL/L

## 2022-06-14 ENCOUNTER — OFFICE VISIT (OUTPATIENT)
Dept: NEPHROLOGY | Age: 72
End: 2022-06-14
Payer: MEDICARE

## 2022-06-14 VITALS
BODY MASS INDEX: 21.44 KG/M2 | SYSTOLIC BLOOD PRESSURE: 122 MMHG | WEIGHT: 141 LBS | OXYGEN SATURATION: 94 % | DIASTOLIC BLOOD PRESSURE: 75 MMHG | HEART RATE: 71 BPM

## 2022-06-14 DIAGNOSIS — E87.1 HYPONATREMIA: Primary | ICD-10-CM

## 2022-06-14 PROCEDURE — 3017F COLORECTAL CA SCREEN DOC REV: CPT | Performed by: INTERNAL MEDICINE

## 2022-06-14 PROCEDURE — 1123F ACP DISCUSS/DSCN MKR DOCD: CPT | Performed by: INTERNAL MEDICINE

## 2022-06-14 PROCEDURE — G8420 CALC BMI NORM PARAMETERS: HCPCS | Performed by: INTERNAL MEDICINE

## 2022-06-14 PROCEDURE — 99213 OFFICE O/P EST LOW 20 MIN: CPT | Performed by: INTERNAL MEDICINE

## 2022-06-14 PROCEDURE — 1036F TOBACCO NON-USER: CPT | Performed by: INTERNAL MEDICINE

## 2022-06-14 PROCEDURE — G8427 DOCREV CUR MEDS BY ELIG CLIN: HCPCS | Performed by: INTERNAL MEDICINE

## 2022-06-14 NOTE — PROGRESS NOTES
[facility]    [place] [address]   [address] [facility], [facility] [address]  [phone]  Progress Note  6/14/2022 10:37 AM        Pt Name:    Noé Nevarez  YOB: 1950  Primary Care Physician:  Macario Cope MD     Chief Complaint:   Chief Complaint   Patient presents with    Follow-Up: Hyponatremia, CKD  Chief Complaint   Patient presents with    Follow-up     6 months             Background Information/Interval History:   68 yo white male who was referred to us by heart failure clinic for evaluation of hyponatremia. Patient is deaf and mute. He has CAD s/p stents, HFpEF, CKD, Atrial Fibrillation. He has been on diuretics for CHF.  He has hx of heavy alcohol abuse. Previously was drinking upto 14-16 beers a day and sometimes more. He underwent mitral clip X 2 (Nov 3, 2020). He was also noted to have pericarditis and underwent colchicine treatment. Here for follow-up. Was a no show few last times. Still drinks about 6-7 beers daily starting afternoon and into the evening, here with his son. Also here with . He is taking sodium chloride 2 tabs po TID. Otherwise he feels well. He has no complaints. He says he is not interested in alcohol cessation or reduction.       Past History:  Past Medical History:   Diagnosis Date    CHARISSA (acute kidney injury) ([address]     Arthritis     general    Blood circulation, collateral     CAD (coronary artery disease)     Cardiomyopathy ([address] 8/13/2014    CHF with cardiomyopathy ([address] 2/4/2019    Chronic atrial fibrillation ([address] 8/13/2014    Chronic kidney disease     Congenital heart disease     Deafness congenital 8/13/2014    ETOH abuse 8/13/2014    ETOH abuse     GERD (gastroesophageal reflux disease)     Hyperlipidemia     Hypertension     Medical non-compliance 8/13/2014    Medtronic BiV ICD implanted 9/18/14 [facility] 10/8/2014    Medtronic BiV ICD-no atrial lead 10/8/2014    Severe malnutrition [facility])      Past Surgical History:   Procedure Laterality Date    CARDIAC DEFIBRILLATOR PLACEMENT  2005    PACEMAKER INSERTION  2005    [place] FOOT/TOES SURGERY PROC UNLISTED Right 11/16/2018    RIGHT FOOT 1ST MTP JOINT ARTHRODESIS performed by Connee Mortimer, MD at [address] [place] [address]        VITALS:  /75 (Site: Left Upper Arm, Position: Sitting, Cuff Size: Medium Adult)   Pulse 71   Wt 141 lb (64 kg)   SpO2 94%   BMI 21.44 kg/m²   Wt Readings from Last 3 Encounters:   06/14/22 141 lb (64 kg)   05/16/22 145 lb (65.8 kg)   11/12/21 148 lb (67.1 kg)     Body mass index is 21.44 kg/m².        General Appearance: alert and cooperative with exam, appears comfortable, no distress  Lungs: Air entry diminished, poor effort, no crackles or rales, no use of accessory muscles  Heart: S1, S2 heard  GI: soft, non-tender, no guarding  Extremities: No sig LE edema     Medications:  Current Outpatient Medications   Medication Sig Dispense Refill    sodium chloride 1 g tablet Take 2 tablets by mouth 2 times daily (Patient taking differently: Take 2 g by mouth 3 times daily ) 360 tablet 3    irbesartan (AVAPRO) 300 MG tablet TAKE 1 TABLET EVERY NIGHT 90 tablet 3    bumetanide (BUMEX) 2 MG tablet Take 1 tablet by mouth daily 90 tablet 3    potassium chloride (KLOR-CON M) 10 MEQ extended release tablet Take 1 tablet by mouth daily 90 tablet 3    bethanechol (URECHOLINE) 25 MG tablet Take 1 tablet by mouth 3 times daily (Patient taking differently: Take 25 mg by mouth 2 times daily Takes 1.5 bid) 90 tablet 3    vitamin B-1 (THIAMINE) 100 MG tablet Take 100 mg by mouth daily      folic acid (FOLVITE) 1 MG tablet Take 1 mg by mouth daily      Multiple Vitamins-Minerals (THERAPEUTIC MULTIVITAMIN-MINERALS) tablet Take 1 tablet by mouth daily      tamsulosin (FLOMAX) 0.4 MG capsule Take 1 capsule by mouth daily 30 capsule 5    cetirizine (ZYRTEC) 10 MG tablet Take 10 mg by mouth daily      atorvastatin (LIPITOR) 80 MG tablet Take 80 mg by mouth nightly      nitroGLYCERIN (NITROSTAT) 0.4 MG SL tablet PLACE 1 TABLET UNDER THE TONGUE EVERY 5 MINUTES AS NEEDED FOR CHEST PAIN UP TO MAX OF 3 TOTAL DOSES. IF NO RELIEF AFTER 1 DOSE, CALL 911. 25 tablet 3    aspirin 81 MG EC tablet Take 81 mg by mouth daily.  gabapentin (NEURONTIN) 100 MG capsule Take 1 capsule by mouth 3 times daily for 360 days. (Patient not taking: Reported on 6/14/2022) 270 capsule 3     No current facility-administered medications for this visit. Laboratory & Diagnostics:  Echo: EF 55%  Na 122, K 5.3, creat 1.2  May 2019: Na 133, K 4.7, creat 1.5, BNP 5218  UA: no blood, no protein, UPCR <94 mg/g  US: R 8.9, L 7.2, bladder wall thickening    June 2019: Na 122, K 4.6  Nov 30 2020: Na 130, K 4.3, Creat 1.6  May 2021: Na 132, creat 1.4, Hb 13.2    Nov 2021: Na 131, K 4.5, Creat 1.1  June 2022: Sodium 129, Creat 0.9     Impression/Plan:   1. Hyponatremia: Very noncompliant. He does not want to reduce alcohol intake. He is not willing to make any changes. He says he has been drinking since age 15 and he has no desire to stop at this time. Multiple factors to consider including excessive alcohol intake/beer potomania and hypervolemia. He understands the risks and complications of chronic alcohol intake and also from low sodium. Son is aware. 2. CKD II: Overall stable, stable renal function  3. Hx CAD s/p stents  4. CHF/HFpEF: Advised fluid restriction. On Bumex. Euvolemic mostly. Has lost about 7 lbs since November. Does not take bumex regularly. 5. Atrial Fibrillation  6. Excessive alcohol consumption: as above, strongly advised reduction. 7. Bladder wall thickening/urinary retention: Patient was following with urology at [facility]. He is no longer seeing urology at [facility]. Advised him to check with urology regarding flomax and urecholine. He says he does not want to take these medications. He does not want to see urology. Son is aware.    8. Non-compliance    Had long discussion with patient and son. Orders Placed This Encounter   Procedures    Basic Metabolic Panel    Basic Metabolic Panel     Return in about 6 months (around 12/14/2022).     Matt Wu MD  [facility]

## 2022-06-21 ENCOUNTER — OFFICE VISIT (OUTPATIENT)
Dept: CARDIOLOGY CLINIC | Age: 72
End: 2022-06-21
Payer: MEDICARE

## 2022-06-21 VITALS
BODY MASS INDEX: 20.76 KG/M2 | HEIGHT: 69 IN | HEART RATE: 96 BPM | SYSTOLIC BLOOD PRESSURE: 132 MMHG | WEIGHT: 140.2 LBS | DIASTOLIC BLOOD PRESSURE: 80 MMHG

## 2022-06-21 DIAGNOSIS — I25.119 CORONARY ARTERY DISEASE INVOLVING NATIVE CORONARY ARTERY OF NATIVE HEART WITH ANGINA PECTORIS (HCC): ICD-10-CM

## 2022-06-21 DIAGNOSIS — I48.0 PAROXYSMAL ATRIAL FIBRILLATION (HCC): ICD-10-CM

## 2022-06-21 DIAGNOSIS — I42.0 DILATED CARDIOMYOPATHY (HCC): Primary | ICD-10-CM

## 2022-06-21 PROCEDURE — 3017F COLORECTAL CA SCREEN DOC REV: CPT | Performed by: NUCLEAR MEDICINE

## 2022-06-21 PROCEDURE — 99213 OFFICE O/P EST LOW 20 MIN: CPT | Performed by: NUCLEAR MEDICINE

## 2022-06-21 PROCEDURE — 1123F ACP DISCUSS/DSCN MKR DOCD: CPT | Performed by: NUCLEAR MEDICINE

## 2022-06-21 PROCEDURE — G8427 DOCREV CUR MEDS BY ELIG CLIN: HCPCS | Performed by: NUCLEAR MEDICINE

## 2022-06-21 PROCEDURE — G8420 CALC BMI NORM PARAMETERS: HCPCS | Performed by: NUCLEAR MEDICINE

## 2022-06-21 PROCEDURE — 1036F TOBACCO NON-USER: CPT | Performed by: NUCLEAR MEDICINE

## 2022-06-21 NOTE — PROGRESS NOTES
[address] [facility],4[address] [address] [facility]  Dept: [phone]  Dept Fax: [phone]  Loc: [phone]    Visit Date: 6/21/2022    J Luis Natarajan is a 67 y.o. male who presents todayfor:  Chief Complaint   Patient presents with    Follow-up     1 year     Valvular Heart Disease    Hypertension    Cardiomyopathy   know mitral clip and CMP   Compliance issues  recent ICD change out  Some site discomfort   No chest pain   Some baseline dyspnea  Bp is stable   No dizziness  No syncope      HPI:  HPI  Past Medical History:   Diagnosis Date    CHARISSA (acute kidney injury) ([address]     Arthritis     general    Blood circulation, collateral     CAD (coronary artery disease)     Cardiomyopathy ([address] 8/13/2014    CHF with cardiomyopathy ([address] 2/4/2019    Chronic atrial fibrillation ([address] 8/13/2014    Chronic kidney disease     Congenital heart disease     Deafness congenital 8/13/2014    ETOH abuse 8/13/2014    ETOH abuse     GERD (gastroesophageal reflux disease)     Hyperlipidemia     Hypertension     Medical non-compliance 8/13/2014    Medtronic BiV ICD implanted 9/18/14 [facility] 10/8/2014    Medtronic BiV ICD-no atrial lead 10/8/2014    Severe malnutrition ([address]       Past Surgical History:   Procedure Laterality Date    CARDIAC DEFIBRILLATOR PLACEMENT  2005    PACEMAKER INSERTION  2005    [place] FOOT/TOES SURGERY PROC UNLISTED Right 11/16/2018    RIGHT FOOT 1ST MTP JOINT ARTHRODESIS performed by Dario Linder MD at [address] SURGERY  2011    PVP     Family History   Problem Relation Age of Onset    Diabetes Mother     Arthritis Mother     Heart Disease Father     High Blood Pressure Father     High Cholesterol Father     Arthritis Father      Social History     Tobacco Use    Smoking status: Never Smoker    Smokeless tobacco: Never Used   Substance Use Topics    Alcohol use:  Yes     Alcohol/week: 8.0 standard drinks     Types: 8 Cans of beer per week     Comment: 8 cans daily      Current Outpatient Medications   Medication Sig Dispense Refill    sodium chloride 1 g tablet Take 2 tablets by mouth 2 times daily (Patient taking differently: Take 2 g by mouth 3 times daily ) 360 tablet 3    irbesartan (AVAPRO) 300 MG tablet TAKE 1 TABLET EVERY NIGHT 90 tablet 3    bumetanide (BUMEX) 2 MG tablet Take 1 tablet by mouth daily 90 tablet 3    potassium chloride (KLOR-CON M) 10 MEQ extended release tablet Take 1 tablet by mouth daily 90 tablet 3    bethanechol (URECHOLINE) 25 MG tablet Take 1 tablet by mouth 3 times daily (Patient taking differently: Take 25 mg by mouth 2 times daily Takes 1.5 bid) 90 tablet 3    vitamin B-1 (THIAMINE) 100 MG tablet Take 100 mg by mouth daily      folic acid (FOLVITE) 1 MG tablet Take 1 mg by mouth daily      Multiple Vitamins-Minerals (THERAPEUTIC MULTIVITAMIN-MINERALS) tablet Take 1 tablet by mouth daily      tamsulosin (FLOMAX) 0.4 MG capsule Take 1 capsule by mouth daily 30 capsule 5    cetirizine (ZYRTEC) 10 MG tablet Take 10 mg by mouth daily      atorvastatin (LIPITOR) 80 MG tablet Take 80 mg by mouth nightly      nitroGLYCERIN (NITROSTAT) 0.4 MG SL tablet PLACE 1 TABLET UNDER THE TONGUE EVERY 5 MINUTES AS NEEDED FOR CHEST PAIN UP TO MAX OF 3 TOTAL DOSES. IF NO RELIEF AFTER 1 DOSE, CALL 911. 25 tablet 3    aspirin 81 MG EC tablet Take 81 mg by mouth daily.  gabapentin (NEURONTIN) 100 MG capsule Take 1 capsule by mouth 3 times daily for 360 days. (Patient not taking: Reported on 6/14/2022) 270 capsule 3     No current facility-administered medications for this visit.      No Known Allergies  Health Maintenance   Topic Date Due    Annual Wellness Visit (AWV)  Never done    COVID-19 Vaccine (1) Never done    Pneumococcal 65+ years Vaccine (1 - PCV) Never done    Depression Screen  Never done    Hepatitis C screen  Never done    Colorectal Cancer Screen  Never done    Shingles vaccine (1 of 2) Never done    Lipids  02/05/2020    Flu vaccine (Season Ended) 09/01/2022    DTaP/Tdap/Td vaccine (2 - Td or Tdap) 12/25/2031    Hepatitis A vaccine  Aged Out    Hepatitis B vaccine  Aged Out    Hib vaccine  Aged Out    Meningococcal (ACWY) vaccine  Aged Out       Subjective:  Review of Systems  General:   No fever, no chills, some fatigue or weight loss  Pulmonary:    some dyspnea, no wheezing  Cardiac:    Denies recent chest pain,   GI:     No nausea or vomiting, no abdominal pain  Neuro:    No dizziness or light headedness,   Musculoskeletal:  No recent active issues  Extremities:   No edema, no obvious claudication       Objective:  Physical Exam  /80   Pulse 96   Ht 5' 9\" (1.753 m)   Wt 140 lb 3.2 oz (63.6 kg)   BMI 20.70 kg/m²   General:   Well developed, well nourished  Lungs:   Clear to auscultation  Heart:    Normal S1 S2, Slight murmur. no rubs, no gallops  Abdomen:   Soft, non tender, no organomegalies, positive bowel sounds  Extremities:   No edema, no cyanosis, good peripheral pulses  Neurological:   Awake, alert, oriented. No obvious focal deficits  Musculoskelatal:  No obvious deformities    Assessment:      Diagnosis Orders   1. Dilated cardiomyopathy (HCC)     2. Paroxysmal atrial fibrillation ([facility]     3. Coronary artery disease involving native coronary artery of native heart with angina pectoris [facility])     as above  Cardiac fair for now     Plan:  No follow-ups on file. As above  Continue risk factor modification and medical management  Thank you for allowing me to participate in the care of your patient. Please don't hesitate to contact me regarding any further issues related to the patient care      Orders Placed:  No orders of the defined types were placed in this encounter. Medications Prescribed:  No orders of the defined types were placed in this encounter. Discussed use, benefit, and side effects of prescribed medications.  All patient questions answered. Pt voicedunderstanding. Instructed to continue current medications, diet and exercise. Continue risk factor modification and medical management. Patient agreed with treatment plan. Follow up as directed.     Electronically signedby Meir Razo MD on 6/21/2022 at 2:51 PM

## 2022-09-22 ENCOUNTER — TELEPHONE (OUTPATIENT)
Dept: CARDIOLOGY CLINIC | Age: 72
End: 2022-09-22

## 2022-09-23 NOTE — TELEPHONE ENCOUNTER
Has not finished setting up tablet, call to son Jay Post, he helps his dad, will call medtronic and get home monitor, doesn't think his dad will do well with tablet/cell phone

## 2022-10-07 ENCOUNTER — TELEPHONE (OUTPATIENT)
Dept: CARDIOLOGY CLINIC | Age: 72
End: 2022-10-07

## 2022-10-07 ENCOUNTER — PROCEDURE VISIT (OUTPATIENT)
Dept: CARDIOLOGY CLINIC | Age: 72
End: 2022-10-07

## 2022-10-07 DIAGNOSIS — Z95.810 BIVENTRICULAR IMPLANTABLE CARDIOVERTER-DEFIBRILLATOR IN SITU: Primary | ICD-10-CM

## 2022-10-07 NOTE — PROGRESS NOTES
[facility] medtronic biv icd     . Mulugeta Monet Battery longevity:  7.8 years   Presenting rhythm  biv paced   9/19/22 7 beats VT   Elevated optivol, addressed in separate encounter     Atrial impedance >3000 no atrial lead   RV impedance 418    Shock 61      R wave sensing 6.4  99.5 % RV paced       RV threshold 0.625 V at 0.4ms  LV 0.875 @ 1

## 2022-10-07 NOTE — TELEPHONE ENCOUNTER
Call to son, Warren Matute, aware of elevated optivol. Doesn't sound like Dmitriy is taking his meds. Encouraged him to at least try to get some bumex in him.   States he'll call his sister and see if she can talk him into taking some meds

## 2022-10-20 NOTE — TELEPHONE ENCOUNTER
Agree Topical Clindamycin Pregnancy And Lactation Text: This medication is Pregnancy Category B and is considered safe during pregnancy. It is unknown if it is excreted in breast milk.

## 2022-10-28 ENCOUNTER — TELEPHONE (OUTPATIENT)
Dept: CARDIOLOGY CLINIC | Age: 72
End: 2022-10-28

## 2022-10-28 NOTE — TELEPHONE ENCOUNTER
Patient's son called after office hours and was wanting to speak with clinical staff. He said that he's just been having trouble getting around and thinks he may need some blood work done and doesn't have an appt for awhile.     DOLV 06/21/2022 DONV 06/20/2023    Please f/u with patients son, # [phone]

## 2022-10-31 NOTE — TELEPHONE ENCOUNTER
Spoke to pt's son. He was thinking pt had appt with Dr Adriana Lemus in December, no appt until June. Informed Lesia pt has appt with Dr Fifi Valenzuela in December and it looks like BMP is needed. Lesia requested BMP order be faxed to [facility] outpatient lab. BMP order faxed.

## 2022-11-11 ENCOUNTER — PROCEDURE VISIT (OUTPATIENT)
Dept: CARDIOLOGY CLINIC | Age: 72
End: 2022-11-11
Payer: MEDICARE

## 2022-11-11 DIAGNOSIS — I50.22 CHRONIC SYSTOLIC CHF (CONGESTIVE HEART FAILURE), NYHA CLASS 3 (HCC): Primary | ICD-10-CM

## 2022-11-11 PROCEDURE — 93297 REM INTERROG DEV EVAL ICPMS: CPT | Performed by: NUCLEAR MEDICINE

## 2022-11-11 PROCEDURE — G2066 INTER DEVC REMOTE 30D: HCPCS | Performed by: NUCLEAR MEDICINE

## 2022-12-07 LAB
BUN BLDV-MCNC: 20 MG/DL
CALCIUM SERPL-MCNC: 9.4 MG/DL
CHLORIDE BLD-SCNC: 96 MMOL/L
CO2: 23 MMOL/L
CREAT SERPL-MCNC: 1.5 MG/DL
GFR CALCULATED: 46
GLUCOSE BLD-MCNC: 125 MG/DL
POTASSIUM SERPL-SCNC: 4.7 MMOL/L
SODIUM BLD-SCNC: 129 MMOL/L

## 2022-12-08 ENCOUNTER — TELEPHONE (OUTPATIENT)
Dept: NEPHROLOGY | Age: 72
End: 2022-12-08

## 2022-12-08 NOTE — TELEPHONE ENCOUNTER
----- Message from Carine Pierre MD sent at 12/8/2022  2:32 PM EST -----  Sodium slightly low, inform patient  Is he drinking alcohol? Also has he been taking salt tablets?

## 2022-12-08 NOTE — TELEPHONE ENCOUNTER
Spoke to pt's daughter she stated that her dad does drink alcohol and is not planning to stop. She also stated that he is does not take his salt tabs like he is supposed to. She informed me that she is going to inform him that he needs to start taking his medications as prescribed.

## 2022-12-16 ENCOUNTER — PROCEDURE VISIT (OUTPATIENT)
Dept: CARDIOLOGY CLINIC | Age: 72
End: 2022-12-16

## 2022-12-16 DIAGNOSIS — I50.22 CHRONIC SYSTOLIC CHF (CONGESTIVE HEART FAILURE), NYHA CLASS 3 (HCC): Primary | ICD-10-CM

## 2023-01-19 PROCEDURE — 93296 REM INTERROG EVL PM/IDS: CPT | Performed by: NUCLEAR MEDICINE

## 2023-01-19 PROCEDURE — 93295 DEV INTERROG REMOTE 1/2/MLT: CPT | Performed by: NUCLEAR MEDICINE

## 2023-01-24 ENCOUNTER — PROCEDURE VISIT (OUTPATIENT)
Dept: CARDIOLOGY CLINIC | Age: 73
End: 2023-01-24
Payer: MEDICARE

## 2023-01-24 DIAGNOSIS — Z95.810 BIVENTRICULAR IMPLANTABLE CARDIOVERTER-DEFIBRILLATOR IN SITU: Primary | ICD-10-CM

## 2023-01-24 NOTE — PROGRESS NOTES
DR Rosana Shore PT  MEDTRONIC BIV ICD REMOTE   BATTERY 7.9 YRS REMAINING    KNOWN PAF/ NO OACS    VVIR     A PACED 99.5%   RV WAVES 6.1  RV IMPEDENCE 456  RV 55  LV IMPEDENCE 304    RV THRESHOLD 0.625 @ 0.4  LV THRESHOLD 0.75 @ 0.4    RV AMPLITUDE 2 @ 0.4  LV AMPLITUDE 1.25 @ 0.4    OPRIVOL WNL

## 2023-02-19 LAB
BUN BLDV-MCNC: 10 MG/DL
CALCIUM SERPL-MCNC: 9 MG/DL
CHLORIDE BLD-SCNC: 100 MMOL/L
CO2: 19 MMOL/L
CREAT SERPL-MCNC: 1.2 MG/DL
EGFR: 60
GLUCOSE BLD-MCNC: 114 MG/DL
POTASSIUM SERPL-SCNC: 4.3 MMOL/L
SODIUM BLD-SCNC: 132 MMOL/L

## 2023-02-24 ENCOUNTER — PROCEDURE VISIT (OUTPATIENT)
Dept: CARDIOLOGY CLINIC | Age: 73
End: 2023-02-24

## 2023-02-24 DIAGNOSIS — I50.22 CHRONIC SYSTOLIC CHF (CONGESTIVE HEART FAILURE), NYHA CLASS 3 (HCC): Primary | ICD-10-CM

## 2023-02-24 NOTE — PROGRESS NOTES
[facility] Optivol  Pt of [place]    Battery 7.7 years    Optivol WNL

## 2023-03-31 ENCOUNTER — PROCEDURE VISIT (OUTPATIENT)
Dept: CARDIOLOGY CLINIC | Age: 73
End: 2023-03-31
Payer: MEDICARE

## 2023-03-31 DIAGNOSIS — I42.9 CHF WITH CARDIOMYOPATHY (HCC): Primary | ICD-10-CM

## 2023-03-31 DIAGNOSIS — I50.9 CHF WITH CARDIOMYOPATHY (HCC): Primary | ICD-10-CM

## 2023-03-31 PROCEDURE — 93297 REM INTERROG DEV EVAL ICPMS: CPT | Performed by: NUCLEAR MEDICINE

## 2023-03-31 PROCEDURE — G2066 INTER DEVC REMOTE 30D: HCPCS | Performed by: NUCLEAR MEDICINE

## 2023-05-05 ENCOUNTER — PROCEDURE VISIT (OUTPATIENT)
Dept: CARDIOLOGY CLINIC | Age: 73
End: 2023-05-05

## 2023-05-05 DIAGNOSIS — Z95.810 BIVENTRICULAR IMPLANTABLE CARDIOVERTER-DEFIBRILLATOR IN SITU: Primary | ICD-10-CM

## 2023-05-05 NOTE — PROGRESS NOTES
[facility] medtronic biv icd     No atrial lead     . Erica Morton Battery longevity:  7.7 years on device   Presenting rhythm  biv paced   Optivol WNL    RV impedance 456    Shock 60    R wave sensing 2.4    99.7 % RV paced     RV threshold 0.625 V at 0.4ms  LV 0.75 @ 0.4

## 2023-06-20 ENCOUNTER — OFFICE VISIT (OUTPATIENT)
Dept: CARDIOLOGY CLINIC | Age: 73
End: 2023-06-20
Payer: MEDICARE

## 2023-06-20 DIAGNOSIS — I42.0 DILATED CARDIOMYOPATHY (HCC): Primary | ICD-10-CM

## 2023-06-20 DIAGNOSIS — I48.0 PAROXYSMAL ATRIAL FIBRILLATION (HCC): ICD-10-CM

## 2023-06-20 DIAGNOSIS — Z98.890 S/P MVR (MITRAL VALVE REPAIR): ICD-10-CM

## 2023-06-20 DIAGNOSIS — I10 PRIMARY HYPERTENSION: ICD-10-CM

## 2023-06-20 DIAGNOSIS — I25.10 CORONARY ARTERY DISEASE INVOLVING NATIVE CORONARY ARTERY OF NATIVE HEART WITHOUT ANGINA PECTORIS: ICD-10-CM

## 2023-06-20 PROCEDURE — 1036F TOBACCO NON-USER: CPT | Performed by: NUCLEAR MEDICINE

## 2023-06-20 PROCEDURE — 3017F COLORECTAL CA SCREEN DOC REV: CPT | Performed by: NUCLEAR MEDICINE

## 2023-06-20 PROCEDURE — 99214 OFFICE O/P EST MOD 30 MIN: CPT | Performed by: NUCLEAR MEDICINE

## 2023-06-20 PROCEDURE — G8420 CALC BMI NORM PARAMETERS: HCPCS | Performed by: NUCLEAR MEDICINE

## 2023-06-20 PROCEDURE — 1123F ACP DISCUSS/DSCN MKR DOCD: CPT | Performed by: NUCLEAR MEDICINE

## 2023-06-20 PROCEDURE — G8428 CUR MEDS NOT DOCUMENT: HCPCS | Performed by: NUCLEAR MEDICINE

## 2023-06-20 RX ORDER — POTASSIUM CHLORIDE 750 MG/1
10 TABLET, EXTENDED RELEASE ORAL DAILY
Qty: 90 TABLET | Refills: 3 | Status: SHIPPED | OUTPATIENT
Start: 2023-06-20

## 2023-06-20 NOTE — PROGRESS NOTES
[address] [place] [address] [address] [facility],4Th Floor [address] [facility]  Dept: [phone]  Dept Fax: [phone]  Loc: [phone]    Visit Date: 6/20/2023    Gretchen Leal is a 68 y.o. male who presents todayfor:  Chief Complaint   Patient presents with    Cardiomyopathy    Valvular Heart Disease    Atrial Fibrillation   Known CAD and MV clip   Still drinking heavy   some chest pain  Used nitro once  No frequent episodes   Some dyspnea  Exertional   COMPLIANCE issues  BP is stable  No dizziness  No syncope  ICD is stable   No shocks  A fib is stable         HPI:  HPI  Past Medical History:   Diagnosis Date    CHARISSA (acute kidney injury) ([address]     Arthritis     general    Blood circulation, collateral     CAD (coronary artery disease)     Cardiomyopathy ([address] 8/13/2014    CHF with cardiomyopathy ([address] 2/4/2019    Chronic atrial fibrillation ([address] 8/13/2014    Chronic kidney disease     Congenital heart disease     Deafness congenital 8/13/2014    ETOH abuse 8/13/2014    ETOH abuse     GERD (gastroesophageal reflux disease)     Hyperlipidemia     Hypertension     Medical non-compliance 8/13/2014    Medtronic BiV ICD implanted 9/18/14 [facility] 10/8/2014    Medtronic BiV ICD-no atrial lead 10/8/2014    Severe malnutrition ([address]       Past Surgical History:   Procedure Laterality Date    CARDIAC DEFIBRILLATOR PLACEMENT  2005    PACEMAKER INSERTION  2005    [place] FOOT/TOES SURGERY PROC UNLISTED Right 11/16/2018    RIGHT FOOT 1ST MTP JOINT ARTHRODESIS performed by Michelle Walsh MD at [address]  2011    PVP     Family History   Problem Relation Age of Onset    Diabetes Mother     Arthritis Mother     Heart Disease Father     High Blood Pressure Father     High Cholesterol Father     Arthritis Father      Social History     Tobacco Use    Smoking status: Never    Smokeless tobacco: Never   Substance Use Topics    Alcohol use:  Yes     Alcohol/week: 8.0 standard

## 2023-06-27 DIAGNOSIS — I10 PRIMARY HYPERTENSION: ICD-10-CM

## 2023-06-27 DIAGNOSIS — I48.0 PAROXYSMAL ATRIAL FIBRILLATION (HCC): ICD-10-CM

## 2023-06-27 DIAGNOSIS — Z98.890 S/P MVR (MITRAL VALVE REPAIR): ICD-10-CM

## 2023-06-27 DIAGNOSIS — I25.10 CORONARY ARTERY DISEASE INVOLVING NATIVE CORONARY ARTERY OF NATIVE HEART WITHOUT ANGINA PECTORIS: ICD-10-CM

## 2023-06-27 DIAGNOSIS — I42.0 DILATED CARDIOMYOPATHY (HCC): ICD-10-CM

## 2023-06-29 ENCOUNTER — NURSE ONLY (OUTPATIENT)
Dept: CARDIOLOGY CLINIC | Age: 73
End: 2023-06-29
Payer: MEDICARE

## 2023-06-29 DIAGNOSIS — Z95.810 BIVENTRICULAR IMPLANTABLE CARDIOVERTER-DEFIBRILLATOR IN SITU: Primary | ICD-10-CM

## 2023-06-29 PROCEDURE — 93283 PRGRMG EVAL IMPLANTABLE DFB: CPT | Performed by: NUCLEAR MEDICINE

## 2023-07-07 ENCOUNTER — PROCEDURE VISIT (OUTPATIENT)
Dept: CARDIOLOGY CLINIC | Age: 73
End: 2023-07-07

## 2023-07-07 DIAGNOSIS — I50.22 CHRONIC SYSTOLIC CHF (CONGESTIVE HEART FAILURE), NYHA CLASS 3 (HCC): Primary | ICD-10-CM

## 2023-07-13 RX ORDER — SODIUM CHLORIDE 1 G/1
2 TABLET ORAL 2 TIMES DAILY
Qty: 120 TABLET | Refills: 1 | Status: SHIPPED | OUTPATIENT
Start: 2023-07-13

## 2023-07-13 NOTE — TELEPHONE ENCOUNTER
Left message on sons voicemail to please call us to schedule an appt before medication can be refilled.

## 2023-07-13 NOTE — TELEPHONE ENCOUNTER
I sent a 30 days with 1 refill for now so that he can continue his medications.  But he needs to see me if he wants me to continue to manage his hyponatremia (low sodium ) problems

## 2023-07-17 DIAGNOSIS — N18.31 STAGE 3A CHRONIC KIDNEY DISEASE (HCC): ICD-10-CM

## 2023-07-17 DIAGNOSIS — E87.1 HYPONATREMIA: Primary | ICD-10-CM

## 2023-07-18 LAB
BUN BLDV-MCNC: 17 MG/DL
CALCIUM SERPL-MCNC: 9.2 MG/DL
CHLORIDE BLD-SCNC: 86 MMOL/L
CO2: 26 MMOL/L
CREAT SERPL-MCNC: 1.1 MG/DL
EGFR: 66
GLUCOSE BLD-MCNC: 94 MG/DL
POTASSIUM SERPL-SCNC: 3.9 MMOL/L
SODIUM BLD-SCNC: 126 MMOL/L

## 2023-07-19 ENCOUNTER — TELEPHONE (OUTPATIENT)
Dept: NEPHROLOGY | Age: 73
End: 2023-07-19

## 2023-07-19 RX ORDER — SODIUM CHLORIDE 1 G/1
2 TABLET ORAL 3 TIMES DAILY
Qty: 180 TABLET | Refills: 3 | Status: SHIPPED | OUTPATIENT
Start: 2023-07-19

## 2023-07-19 NOTE — TELEPHONE ENCOUNTER
Gillian Triana MD  [facility] Clinical Staff  Pls increase Sodium chloride 2 grams TID        Called and informed patients daughter.  She would like a new rx sent to the pharmacy, [facility] updated and script pending

## 2023-07-21 ENCOUNTER — OFFICE VISIT (OUTPATIENT)
Dept: NEPHROLOGY | Age: 73
End: 2023-07-21
Payer: MEDICARE

## 2023-07-21 VITALS
BODY MASS INDEX: 19.64 KG/M2 | DIASTOLIC BLOOD PRESSURE: 83 MMHG | SYSTOLIC BLOOD PRESSURE: 117 MMHG | OXYGEN SATURATION: 96 % | HEART RATE: 76 BPM | WEIGHT: 133 LBS

## 2023-07-21 DIAGNOSIS — E87.1 HYPONATREMIA: Primary | ICD-10-CM

## 2023-07-21 DIAGNOSIS — R39.11 HESITANCY: ICD-10-CM

## 2023-07-21 PROCEDURE — 3017F COLORECTAL CA SCREEN DOC REV: CPT | Performed by: INTERNAL MEDICINE

## 2023-07-21 PROCEDURE — 3074F SYST BP LT 130 MM HG: CPT | Performed by: INTERNAL MEDICINE

## 2023-07-21 PROCEDURE — G8427 DOCREV CUR MEDS BY ELIG CLIN: HCPCS | Performed by: INTERNAL MEDICINE

## 2023-07-21 PROCEDURE — 3078F DIAST BP <80 MM HG: CPT | Performed by: INTERNAL MEDICINE

## 2023-07-21 PROCEDURE — 99213 OFFICE O/P EST LOW 20 MIN: CPT | Performed by: INTERNAL MEDICINE

## 2023-07-21 PROCEDURE — 1036F TOBACCO NON-USER: CPT | Performed by: INTERNAL MEDICINE

## 2023-07-21 PROCEDURE — 1123F ACP DISCUSS/DSCN MKR DOCD: CPT | Performed by: INTERNAL MEDICINE

## 2023-07-21 PROCEDURE — G8420 CALC BMI NORM PARAMETERS: HCPCS | Performed by: INTERNAL MEDICINE

## 2023-07-21 RX ORDER — TAMSULOSIN HYDROCHLORIDE 0.4 MG/1
0.4 CAPSULE ORAL DAILY
Qty: 90 CAPSULE | Refills: 1 | Status: SHIPPED | OUTPATIENT
Start: 2023-07-21

## 2023-07-21 NOTE — PROGRESS NOTES
Problems urinating, burns a little and hurts trying to go. After going there is some relief. He goes a lot when he gets started.     Script pending
1 tablet by mouth nightly      gabapentin (NEURONTIN) 100 MG capsule Take 1 capsule by mouth 3 times daily for 360 days. 270 capsule 3    nitroGLYCERIN (NITROSTAT) 0.4 MG SL tablet PLACE 1 TABLET UNDER THE TONGUE EVERY 5 MINUTES AS NEEDED FOR CHEST PAIN UP TO MAX OF 3 TOTAL DOSES. IF NO RELIEF AFTER 1 DOSE, CALL 911. 25 tablet 3    aspirin 81 MG EC tablet Take 1 tablet by mouth daily       No current facility-administered medications for this visit. Laboratory & Diagnostics:  Echo: EF 55%  Na 122, K 5.3, creat 1.2  May 2019: Na 133, K 4.7, creat 1.5, BNP 5218  UA: no blood, no protein, UPCR <94 mg/g  US: R 8.9, L 7.2, bladder wall thickening    June 2019: Na 122, K 4.6  Nov 30 2020: Na 130, K 4.3, Creat 1.6  May 2021: Na 132, creat 1.4, Hb 13.2    Nov 2021: Na 131, K 4.5, Creat 1.1  June 2022: Sodium 129, Creat 0.9    July 2023: Sodium 126. K 3.9, creat 1.1     Impression/Plan:   1. Hyponatremia: Very noncompliant. He does not want to reduce alcohol intake. He is not willing to make any changes. Multiple discussions have been held regarding this in the past. Patient is not  following recommendations. Son says he is not willing to change and very stubborn. Pt understands complications from low sodium and chronic heavy alcohol use Discussed in detail. Take salt tablets 2 g 3 times daily. Advised alcohol reduction/cessation  2. CKD II: Overall stable, stable renal function  3. Hx CAD s/p stents  4. CHF/EF 45%: Advised fluid restriction. On Bumex  5. Atrial Fibrillation  6. Excessive alcohol consumption: as above, strongly advised reduction. 7. Bladder wall thickening/urinary retention/hx TURP: he was following with urology but he stopped seeing urology. He is reporting LUTS symptoms again. Will refer to urology.    8. Non-compliance  Orders Placed This Encounter   Procedures    US RENAL COMPLETE    Basic Metabolic Panel    Basic Metabolic Panel    [facility]. [facility]     Return in about 6 months

## 2023-07-26 LAB
BUN BLDV-MCNC: 11 MG/DL
CALCIUM SERPL-MCNC: 9 MG/DL
CHLORIDE BLD-SCNC: 90 MMOL/L
CO2: 23 MMOL/L
CREAT SERPL-MCNC: 1.1 MG/DL
EGFR: 66
GLUCOSE BLD-MCNC: 85 MG/DL
POTASSIUM SERPL-SCNC: 4.1 MMOL/L
SODIUM BLD-SCNC: 128 MMOL/L

## 2023-07-28 ENCOUNTER — TELEPHONE (OUTPATIENT)
Dept: NEPHROLOGY | Age: 73
End: 2023-07-28

## 2023-07-28 DIAGNOSIS — E87.1 HYPONATREMIA: Primary | ICD-10-CM

## 2023-07-28 NOTE — TELEPHONE ENCOUNTER
Spoke to pt's son, he understands to have pt continue on salt tablets and repeat sodium in 2 weeks.     Lab order pending

## 2023-07-28 NOTE — TELEPHONE ENCOUNTER
----- Message from Jose J Mack MD sent at 7/27/2023  4:23 PM EDT -----  Sodium in 2 weeks  Continue with salt tablets

## 2023-07-31 DIAGNOSIS — R39.11 HESITANCY: ICD-10-CM

## 2023-08-07 LAB — SODIUM BLD-SCNC: 135 MMOL/L

## 2023-08-08 ENCOUNTER — TELEPHONE (OUTPATIENT)
Dept: NEPHROLOGY | Age: 73
End: 2023-08-08

## 2023-08-08 DIAGNOSIS — E87.1 HYPONATREMIA: Primary | ICD-10-CM

## 2023-08-08 NOTE — TELEPHONE ENCOUNTER
Spoke to pt's son Katrina Moya, he understands that pt's sodium is improving and repeat labs in 4 weeks. Lab order pending.

## 2023-08-11 ENCOUNTER — TELEPHONE (OUTPATIENT)
Dept: CARDIOLOGY CLINIC | Age: 73
End: 2023-08-11

## 2023-08-11 ENCOUNTER — PROCEDURE VISIT (OUTPATIENT)
Dept: CARDIOLOGY CLINIC | Age: 73
End: 2023-08-11

## 2023-08-11 DIAGNOSIS — I50.22 CHRONIC SYSTOLIC CHF (CONGESTIVE HEART FAILURE), NYHA CLASS 3 (HCC): Primary | ICD-10-CM

## 2023-08-11 NOTE — TELEPHONE ENCOUNTER
Call to son, Caden Ram has lost weight, denies SOB or swelling.   Is taking salt tabs for low sodium    Aware, will continue to monitor

## 2023-08-11 NOTE — PROGRESS NOTES
[facility] medtronic optivol     7.3 years on device  Elevated optivol, will address in separate encounter

## 2023-08-15 RX ORDER — SODIUM CHLORIDE 1 G/1
2 TABLET ORAL 3 TIMES DAILY
Qty: 540 TABLET | Refills: 1 | Status: SHIPPED | OUTPATIENT
Start: 2023-08-15

## 2023-08-15 RX ORDER — BUMETANIDE 2 MG/1
2 TABLET ORAL DAILY
Qty: 90 TABLET | Refills: 3 | Status: SHIPPED | OUTPATIENT
Start: 2023-08-15

## 2023-09-01 LAB — SODIUM BLD-SCNC: 125 MMOL/L

## 2023-09-05 ENCOUNTER — TELEPHONE (OUTPATIENT)
Dept: NEPHROLOGY | Age: 73
End: 2023-09-05

## 2023-09-05 DIAGNOSIS — E87.1 HYPONATREMIA: Primary | ICD-10-CM

## 2023-09-05 NOTE — TELEPHONE ENCOUNTER
----- Message from Prachi Olivas MD sent at 9/5/2023 11:02 AM EDT -----  Sodium is low  Is he taking salt tablets and how much? Check with patient  Is he drinking alcohol heavily? Need to restrict alcohol intake if he is drinking too much because it will drop his sodium level. Recheck sodium level in 3 days.

## 2023-09-05 NOTE — RESULT ENCOUNTER NOTE
Sodium is low  Is he taking salt tablets and how much? Check with patient  Is he drinking alcohol heavily? Need to restrict alcohol intake if he is drinking too much because it will drop his sodium level. Recheck sodium level in 3 days.

## 2023-09-05 NOTE — TELEPHONE ENCOUNTER
Spoke to pt's son, he informed me that pt is taking sodium chloride 2 g tid and pt is drink a lot. I informed pt's sont to let pt know that that will drop his sodium and he needs to cut way back. Pt's son will inform pt and get labs done at [facility].

## 2023-09-11 ENCOUNTER — TELEPHONE (OUTPATIENT)
Dept: NEPHROLOGY | Age: 73
End: 2023-09-11

## 2023-09-11 LAB — SODIUM BLD-SCNC: 132 MMOL/L

## 2023-09-11 NOTE — TELEPHONE ENCOUNTER
Son Stephane Body called in requesting a prescription for 2 tabs of k a day instead of 1.  After reviewing pts chart, the k is prescribed by his cardiologist. Pt will call Dr. Yanely Matute

## 2023-09-15 ENCOUNTER — PROCEDURE VISIT (OUTPATIENT)
Dept: CARDIOLOGY CLINIC | Age: 73
End: 2023-09-15

## 2023-09-15 DIAGNOSIS — Z95.810 BIVENTRICULAR IMPLANTABLE CARDIOVERTER-DEFIBRILLATOR IN SITU: Primary | ICD-10-CM

## 2023-09-15 NOTE — PROGRESS NOTES
[facility] medtronic biv icd   No atrial lead     7.2 years on device   RV imped 418 sh0ck 56    R fwaves 6.6  RV threshold 0.75 @ 0.4  LV 0.75 @ 1  Optivol WNL

## 2023-10-20 ENCOUNTER — PROCEDURE VISIT (OUTPATIENT)
Dept: CARDIOLOGY CLINIC | Age: 73
End: 2023-10-20

## 2023-10-20 ENCOUNTER — TELEPHONE (OUTPATIENT)
Dept: CARDIOLOGY CLINIC | Age: 73
End: 2023-10-20

## 2023-10-20 DIAGNOSIS — I50.22 CHRONIC SYSTOLIC CHF (CONGESTIVE HEART FAILURE), NYHA CLASS 3 (HCC): Primary | ICD-10-CM

## 2023-10-20 NOTE — TELEPHONE ENCOUNTER
[facility] Medtronic BiV ICD Optivol  Pt of [place]    Battery 7 years    Optivol elevated. Episodes   10/16/23-19 beats VT rate 224    Call to son, Alive. LMOM. Estrella Smart returned call. Dmitriy has not been checking weight daily, but has no swelling/bloating or SOB. Unaware of fast rates on 10/16/23.

## 2023-10-20 NOTE — PROGRESS NOTES
[facility] Medtronic BiV ICD Optivol  Pt of [place]    Battery 7 years    Optivol elevated. Episodes   10/16/23-19 beats VT rate 224    Call to son, Alive. LMOM. Will address in phone encounter.

## 2023-11-06 RX ORDER — ATORVASTATIN CALCIUM 80 MG/1
80 TABLET, FILM COATED ORAL NIGHTLY
Qty: 90 TABLET | Refills: 2 | Status: SHIPPED | OUTPATIENT
Start: 2023-11-06

## 2023-11-06 NOTE — TELEPHONE ENCOUNTER
Dmitriy Angel called requesting a refill on the following medications:  Requested Prescriptions     Pending Prescriptions Disp Refills    atorvastatin (LIPITOR) 80 MG tablet 30 tablet      Sig: Take 1 tablet by mouth nightly     [facility]arm[facility]      Date of last visit: 062023  Date of next visit (if applicable): Visit date not found

## 2023-12-01 ENCOUNTER — PROCEDURE VISIT (OUTPATIENT)
Dept: CARDIOLOGY CLINIC | Age: 73
End: 2023-12-01

## 2023-12-01 DIAGNOSIS — I50.22 CHRONIC SYSTOLIC CHF (CONGESTIVE HEART FAILURE), NYHA CLASS 3 (HCC): Primary | ICD-10-CM

## 2024-01-05 ENCOUNTER — PROCEDURE VISIT (OUTPATIENT)
Dept: CARDIOLOGY CLINIC | Age: 74
End: 2024-01-05

## 2024-01-05 DIAGNOSIS — Z95.810 BIVENTRICULAR IMPLANTABLE CARDIOVERTER-DEFIBRILLATOR IN SITU: Primary | ICD-10-CM

## 2024-01-05 NOTE — PROGRESS NOTES
Dr sanders pt   [employer] biv icd remote / atrial lead is off  Battery 6.8 yrs remaining    Vvir     Rv waves 6.6  Atrial impedence 380  Rv 49  Lv impedence 285    Atrial threshold 0.875 @ 04  Vent threshold 0.625 @ 1    Rv amplitude 2 @ 0.4  Lv amplitude 1.25 @ 1  Bv paced 99%    Optivol wnl  No episodes

## 2024-02-08 ENCOUNTER — PROCEDURE VISIT (OUTPATIENT)
Dept: CARDIOLOGY CLINIC | Age: 74
End: 2024-02-08

## 2024-02-08 DIAGNOSIS — I50.22 CHRONIC SYSTOLIC CHF (CONGESTIVE HEART FAILURE), NYHA CLASS 3 (HCC): Primary | ICD-10-CM

## 2024-02-08 NOTE — PROGRESS NOTES
[facility] Med[employer] BiV ICD Optivol  Pt of [place]aki    Battery 6.8 years    Optivol WNL

## 2024-03-11 ENCOUNTER — TELEPHONE (OUTPATIENT)
Dept: CARDIOLOGY CLINIC | Age: 74
End: 2024-03-11

## 2024-03-11 ENCOUNTER — PROCEDURE VISIT (OUTPATIENT)
Dept: CARDIOLOGY CLINIC | Age: 74
End: 2024-03-11
Payer: MEDICARE

## 2024-03-11 DIAGNOSIS — I50.22 CHRONIC SYSTOLIC CHF (CONGESTIVE HEART FAILURE), NYHA CLASS 3 (HCC): Primary | ICD-10-CM

## 2024-03-11 PROCEDURE — 93297 REM INTERROG DEV EVAL ICPMS: CPT | Performed by: NUCLEAR MEDICINE

## 2024-03-11 NOTE — TELEPHONE ENCOUNTER
[facility]  Pt of [place]    Battery 6.7 years    Optivol elevated    Call to patient's son. No answer. Will address in phone encounter.

## 2024-03-11 NOTE — PROGRESS NOTES
[facility]  Pt of [place]    Battery 6.7 years    Optivol elevated    Call to patient's son. No answer. Will address in phone encounter.

## 2024-03-28 RX ORDER — TAMSULOSIN HYDROCHLORIDE 0.4 MG/1
0.4 CAPSULE ORAL DAILY
Qty: 90 CAPSULE | Refills: 3 | OUTPATIENT
Start: 2024-03-28

## 2024-04-14 PROCEDURE — 93295 DEV INTERROG REMOTE 1/2/MLT: CPT | Performed by: NUCLEAR MEDICINE

## 2024-04-14 PROCEDURE — 93296 REM INTERROG EVL PM/IDS: CPT | Performed by: NUCLEAR MEDICINE

## 2024-04-15 ENCOUNTER — PROCEDURE VISIT (OUTPATIENT)
Dept: CARDIOLOGY CLINIC | Age: 74
End: 2024-04-15
Payer: MEDICARE

## 2024-04-15 DIAGNOSIS — Z95.810 BIVENTRICULAR IMPLANTABLE CARDIOVERTER-DEFIBRILLATOR IN SITU: Primary | ICD-10-CM

## 2024-05-19 PROCEDURE — 93297 REM INTERROG DEV EVAL ICPMS: CPT | Performed by: INTERNAL MEDICINE

## 2024-05-20 ENCOUNTER — PROCEDURE VISIT (OUTPATIENT)
Dept: CARDIOLOGY CLINIC | Age: 74
End: 2024-05-20
Payer: MEDICARE

## 2024-05-20 DIAGNOSIS — I50.22 CHRONIC SYSTOLIC CHF (CONGESTIVE HEART FAILURE), NYHA CLASS 3 (HCC): Primary | ICD-10-CM

## 2024-05-20 NOTE — PROGRESS NOTES
[facility]     [place] pt   6.5 years on device   Optivol WNl

## 2024-07-29 ENCOUNTER — PROCEDURE VISIT (OUTPATIENT)
Dept: CARDIOLOGY CLINIC | Age: 74
End: 2024-07-29
Payer: MEDICARE

## 2024-07-29 DIAGNOSIS — I50.22 CHRONIC SYSTOLIC CHF (CONGESTIVE HEART FAILURE), NYHA CLASS 3 (HCC): Primary | ICD-10-CM

## 2024-07-29 PROCEDURE — 93297 REM INTERROG DEV EVAL ICPMS: CPT | Performed by: INTERNAL MEDICINE

## 2024-07-29 NOTE — PROGRESS NOTES
DR LAO PT   [employer] CARELINK OPTIVOL REMOTE   BATTERY 6.6 YRS REMAINING  PT NEEDS TO HAVE HIS ICD THERAPIES PROGRAMMED TO B>AX . NOTE ON APPT DESK. PT COMES IN ON 8/5/24      OPTIVOL WNL

## 2024-08-05 ENCOUNTER — NURSE ONLY (OUTPATIENT)
Dept: CARDIOLOGY CLINIC | Age: 74
End: 2024-08-05
Payer: MEDICARE

## 2024-08-05 DIAGNOSIS — Z95.810 BIVENTRICULAR IMPLANTABLE CARDIOVERTER-DEFIBRILLATOR IN SITU: Primary | ICD-10-CM

## 2024-08-05 PROCEDURE — 93280 PM DEVICE PROGR EVAL DUAL: CPT | Performed by: NUCLEAR MEDICINE

## 2024-08-05 NOTE — PROGRESS NOTES
Medtronic biv icd in office     No atrial lead     ..Battery longevity:  6.4 years   Presenting rhythm  biv paced   Optivol WNl  Shock pathways programmed B>AX     RV impedance 456    Shock 66    R wave sensing 7.5    99.1 % RV paced       RV threshold 1.5 V at 0.4ms  LV 0.5 @ 1

## 2024-08-22 RX ORDER — BUMETANIDE 2 MG/1
2 TABLET ORAL DAILY
Qty: 90 TABLET | Refills: 1 | Status: SHIPPED | OUTPATIENT
Start: 2024-08-22

## 2024-08-22 RX ORDER — ATORVASTATIN CALCIUM 80 MG/1
80 TABLET, FILM COATED ORAL NIGHTLY
Qty: 90 TABLET | Refills: 1 | Status: SHIPPED | OUTPATIENT
Start: 2024-08-22

## 2024-08-22 RX ORDER — POTASSIUM CHLORIDE 750 MG/1
10 TABLET, EXTENDED RELEASE ORAL DAILY
Qty: 90 TABLET | Refills: 1 | Status: SHIPPED | OUTPATIENT
Start: 2024-08-22

## 2024-09-09 ENCOUNTER — PROCEDURE VISIT (OUTPATIENT)
Dept: CARDIOLOGY CLINIC | Age: 74
End: 2024-09-09
Payer: MEDICARE

## 2024-09-09 DIAGNOSIS — I50.22 CHRONIC SYSTOLIC CHF (CONGESTIVE HEART FAILURE), NYHA CLASS 3 (HCC): Primary | ICD-10-CM

## 2024-09-09 PROCEDURE — 93297 REM INTERROG DEV EVAL ICPMS: CPT | Performed by: NUCLEAR MEDICINE

## 2024-12-17 PROCEDURE — 93297 REM INTERROG DEV EVAL ICPMS: CPT | Performed by: NUCLEAR MEDICINE

## 2025-01-15 RX ORDER — POTASSIUM CHLORIDE 750 MG/1
10 TABLET, EXTENDED RELEASE ORAL DAILY
Qty: 90 TABLET | Refills: 3 | Status: SHIPPED | OUTPATIENT
Start: 2025-01-15

## 2025-01-15 RX ORDER — BUMETANIDE 2 MG/1
2 TABLET ORAL DAILY
Qty: 90 TABLET | Refills: 3 | Status: SHIPPED | OUTPATIENT
Start: 2025-01-15

## 2025-01-15 RX ORDER — ATORVASTATIN CALCIUM 80 MG/1
80 TABLET, FILM COATED ORAL NIGHTLY
Qty: 90 TABLET | Refills: 3 | Status: SHIPPED | OUTPATIENT
Start: 2025-01-15

## 2025-08-11 ENCOUNTER — CLINICAL SUPPORT (OUTPATIENT)
Dept: CARDIOLOGY CLINIC | Age: 75
End: 2025-08-11

## 2025-08-11 DIAGNOSIS — Z95.810 BIVENTRICULAR IMPLANTABLE CARDIOVERTER-DEFIBRILLATOR IN SITU: Primary | ICD-10-CM

## (undated) DEVICE — BANDAGE COMPR W4INXL12FT E DISP ESMARCH EVEN

## (undated) DEVICE — GLOVE ORANGE PI 8 1/2   MSG9085

## (undated) DEVICE — 3M™ WARMING BLANKET, UPPER BODY, 10 PER CASE, 42268: Brand: BAIR HUGGER™

## (undated) DEVICE — BLADE LARYNSCP SZ 4 ENH DIR INTUB GLIDESCOPE MCGRATH MAC

## (undated) DEVICE — SUTURE VCRL SZ 0 L36IN ABSRB VLT L36MM CT-1 1/2 CIR J346H

## (undated) DEVICE — PADDING,UNDERCAST,COTTON, 4"X4YD STERILE: Brand: MEDLINE

## (undated) DEVICE — SUTURE PERMA-HAND SZ 2-0 L30IN NONABSORBABLE BLK L26MM SH K833H

## (undated) DEVICE — PADDING UNDERCAST W6INXL4YD RAYON POLY SYN NONADHESIVE

## (undated) DEVICE — SOLUTION IV 1000ML 0.9% SOD CHL PH 5 INJ USP VIAFLX PLAS

## (undated) DEVICE — DRAPE,U/SHT,SPLIT,FILM,60X84,STERILE: Brand: MEDLINE

## (undated) DEVICE — PRECISION (9.0 X 0.51 X 31.0MM)

## (undated) DEVICE — SPONGE GZ W4XL4IN COT 12 PLY TYP VII WVN C FLD DSGN

## (undated) DEVICE — PADDING CAST W6INXL4YD COT LO LINTING WYTEX

## (undated) DEVICE — SUTURE VCRL SZ 3-0 L27IN ABSRB UD L36MM CT-1 1/2 CIR J258H

## (undated) DEVICE — SYRINGE MED 10ML LUERLOCK TIP W/O SFTY DISP

## (undated) DEVICE — INTENDED FOR TISSUE SEPARATION, AND OTHER PROCEDURES THAT REQUIRE A SHARP SURGICAL BLADE TO PUNCTURE OR CUT.: Brand: BARD-PARKER ® CARBON RIB-BACK BLADES

## (undated) DEVICE — GAUZE,SPONGE,8"X4",12PLY,XRAY,STRL,LF: Brand: MEDLINE

## (undated) DEVICE — CHLORAPREP 26ML ORANGE

## (undated) DEVICE — BLADE LARYNSCP SZ 3 ENH DIR INTUB GLIDESCOPE MCGRATH MAC

## (undated) DEVICE — SYRINGE IRRIG 60ML SFT PLIABLE BLB EZ TO GRP 1 HND USE W/

## (undated) DEVICE — SOLUTION IV IRRIG POUR BRL 0.9% SODIUM CHL 2F7124

## (undated) DEVICE — Device

## (undated) DEVICE — DRILL, WL 50MM, AO-SHAFT: Brand: VARIAX

## (undated) DEVICE — BASIC SINGLE BASIN BTC-LF: Brand: MEDLINE INDUSTRIES, INC.

## (undated) DEVICE — SPONGE LAP W18XL18IN WHT COT 4 PLY FLD STRUNG RADPQ DISP ST

## (undated) DEVICE — TETRA-FLEX CF WOVEN LATEX FREE ELASTIC BANDAGE 6" X 5.5 YD: Brand: TETRA-FLEX™CF

## (undated) DEVICE — GOWN,SIRUS,NON REINFRCD,LARGE,SET IN SL: Brand: MEDLINE

## (undated) DEVICE — 3M™ COBAN™ NL STERILE NON-LATEX SELF-ADHERENT WRAP, 2084S, 4 IN X 5 YD (10 CM X 4,5 M), 18 ROLLS/CASE: Brand: 3M™ COBAN™

## (undated) DEVICE — SPLINT ORTH W5XL30IN PLSTR OF PARIS LO EXOTHERM SMOOTH

## (undated) DEVICE — STOCKINETTE ORTH W9XL36IN COT 2 PLY HLLW FOR HANDLING LMB

## (undated) DEVICE — SUTURE MCRYL SZ 4-0 L27IN ABSRB UD L19MM PS-2 1/2 CIR PRIM Y426H

## (undated) DEVICE — SUTURE ABSORBABLE BRAIDED 2-0 CT-1 27 IN UD VICRYL J259H

## (undated) DEVICE — PADDING UNDERCAST W4INXL12FT RAYON POLY SYN NONADHESIVE

## (undated) DEVICE — DRAPE C ARM W36XL30IN RECTANG BND BG AND TAPE

## (undated) DEVICE — DRESSING,GAUZE,XEROFORM,CURAD,5"X9",ST: Brand: CURAD

## (undated) DEVICE — GLOVE SURG SZ 65 THK91MIL LTX FREE SYN POLYISOPRENE

## (undated) DEVICE — ROYAL SILK SURGICAL GOWN, XXL: Brand: CONVERTORS